# Patient Record
Sex: FEMALE | Race: WHITE | Employment: OTHER | URBAN - NONMETROPOLITAN AREA
[De-identification: names, ages, dates, MRNs, and addresses within clinical notes are randomized per-mention and may not be internally consistent; named-entity substitution may affect disease eponyms.]

---

## 2017-02-15 ENCOUNTER — HOSPITAL ENCOUNTER (OUTPATIENT)
Dept: VASCULAR LAB | Age: 67
Discharge: HOME | End: 2017-02-15
Payer: MEDICARE

## 2017-02-15 DIAGNOSIS — M79.662 PAIN OF LEFT LOWER LEG: Primary | ICD-10-CM

## 2017-02-15 PROCEDURE — 93971 EXTREMITY STUDY: CPT

## 2017-04-07 ENCOUNTER — HOSPITAL ENCOUNTER (OUTPATIENT)
Dept: GENERAL RADIOLOGY | Age: 67
Discharge: HOME | End: 2017-04-07
Payer: MEDICARE

## 2017-04-07 DIAGNOSIS — R31.9 BLOOD URINE: ICD-10-CM

## 2017-04-07 PROCEDURE — 74000 XR ABDOMEN LIMITED (KUB): CPT

## 2017-04-10 ENCOUNTER — TELEPHONE (OUTPATIENT)
Dept: UROLOGY | Facility: CLINIC | Age: 67
End: 2017-04-10

## 2017-04-10 DIAGNOSIS — N20.0 KIDNEY STONE: Primary | ICD-10-CM

## 2017-04-10 NOTE — TELEPHONE ENCOUNTER
Order in      ----- Message from Henry Baldwin sent at 4/10/2017 11:12 AM CDT -----  [facility] ORDER

## 2017-04-12 ENCOUNTER — OFFICE VISIT (OUTPATIENT)
Dept: UROLOGY | Facility: CLINIC | Age: 67
End: 2017-04-12

## 2017-04-12 ENCOUNTER — HOSPITAL ENCOUNTER (OUTPATIENT)
Dept: GENERAL RADIOLOGY | Facility: HOSPITAL | Age: 67
Discharge: HOME | End: 2017-04-12
Attending: UROLOGY | Admitting: UROLOGY

## 2017-04-12 VITALS
SYSTOLIC BLOOD PRESSURE: 126 MMHG | DIASTOLIC BLOOD PRESSURE: 84 MMHG | BODY MASS INDEX: 34.66 KG/M2 | WEIGHT: 195.6 LBS | TEMPERATURE: 96.6 F | HEIGHT: 63 IN

## 2017-04-12 DIAGNOSIS — R10.9 FLANK PAIN, ACUTE: Primary | ICD-10-CM

## 2017-04-12 DIAGNOSIS — N20.0 KIDNEY STONE: ICD-10-CM

## 2017-04-12 LAB
BILIRUB BLD-MCNC: NEGATIVE MG/DL
CLARITY, POC: CLEAR
COLOR UR: YELLOW
GLUCOSE UR STRIP-MCNC: ABNORMAL MG/DL
KETONES UR QL: NEGATIVE
LEUKOCYTE EST, POC: NEGATIVE
NITRITE UR-MCNC: NEGATIVE MG/ML
PH UR: 5 [PH] (ref 5–8)
PROT UR STRIP-MCNC: NEGATIVE MG/DL
RBC # UR STRIP: NEGATIVE /UL
SP GR UR: 1.01 (ref 1–1.03)
UROBILINOGEN UR QL: NORMAL

## 2017-04-12 PROCEDURE — 74000 HC ABDOMEN KUB: CPT

## 2017-04-12 PROCEDURE — 99204 OFFICE O/P NEW MOD 45 MIN: CPT | Performed by: UROLOGY

## 2017-04-12 PROCEDURE — 81003 URINALYSIS AUTO W/O SCOPE: CPT | Performed by: UROLOGY

## 2017-04-12 RX ORDER — PANTOPRAZOLE SODIUM 40 MG/1
TABLET, DELAYED RELEASE ORAL
COMMUNITY
Start: 2016-10-26 | End: 2018-09-04

## 2017-04-12 RX ORDER — ATORVASTATIN CALCIUM 20 MG/1
20 TABLET, FILM COATED ORAL NIGHTLY
COMMUNITY

## 2017-04-12 RX ORDER — MELOXICAM 15 MG/1
TABLET ORAL
Status: ON HOLD | COMMUNITY
Start: 2016-09-28 | End: 2018-09-17

## 2017-04-12 RX ORDER — OXYCODONE HYDROCHLORIDE AND ACETAMINOPHEN 5; 325 MG/1; MG/1
1 TABLET ORAL EVERY 8 HOURS PRN
COMMUNITY
Start: 2017-04-10 | End: 2018-09-18 | Stop reason: HOSPADM

## 2017-04-12 RX ORDER — GLIMEPIRIDE 2 MG/1
2 TABLET ORAL DAILY PRN
COMMUNITY
Start: 2016-09-16

## 2017-04-12 RX ORDER — RIVAROXABAN 20 MG/1
TABLET, FILM COATED ORAL
COMMUNITY
Start: 2017-03-20 | End: 2018-09-04

## 2017-04-12 RX ORDER — BISOPROLOL FUMARATE 5 MG/1
5 TABLET, FILM COATED ORAL DAILY
COMMUNITY
Start: 2017-01-17 | End: 2022-02-16

## 2017-04-12 RX ORDER — DULOXETIN HYDROCHLORIDE 60 MG/1
60 CAPSULE, DELAYED RELEASE ORAL DAILY
COMMUNITY
Start: 2017-02-21

## 2017-04-12 NOTE — PROGRESS NOTES
Subjective    Ms. Martin is 66 y.o. female    Chief Complaint: Flank Pain    History of Present Illness     Urolithiasis  Patient complains of bilateral flank pain with radiation to the abdomen. Onset of symptoms was abrupt starting 1 week ago with unchanged course since that time. Patient describes the pain as burning, continuous and rated as moderate. The patient has had no nausea and no vomiting. There has been no fever or chills. The patient is complaining of dysuria, frequency, or urgency.  Previous management of stones includes none      The following portions of the patient's history were reviewed and updated as appropriate: allergies, current medications, past family history, past medical history, past social history, past surgical history and problem list.    Review of Systems   Constitutional: Negative for appetite change, diaphoresis and fever.   HENT: Negative for facial swelling and sore throat.    Eyes: Negative for discharge and visual disturbance.   Respiratory: Negative for cough and shortness of breath.    Cardiovascular: Negative for chest pain and leg swelling.   Gastrointestinal: Negative for anal bleeding and vomiting.   Endocrine: Negative for cold intolerance and heat intolerance.   Genitourinary: Positive for dysuria and flank pain. Negative for hematuria and pelvic pain.   Musculoskeletal: Negative for back pain and gait problem.   Skin: Negative for pallor and rash.   Allergic/Immunologic: Negative for food allergies and immunocompromised state.   Neurological: Negative for seizures and headaches.   Hematological: Negative for adenopathy. Does not bruise/bleed easily.   Psychiatric/Behavioral: Negative for dysphoric mood, self-injury and suicidal ideas.         Current Outpatient Prescriptions:   •  aspirin 81 MG tablet, , Disp: , Rfl:   •  atorvastatin (LIPITOR) 20 MG tablet, Take  by mouth., Disp: , Rfl:   •  bisoprolol (ZEBeta) 5 MG tablet, , Disp: , Rfl:   •  DULoxetine (CYMBALTA)  "60 MG capsule, , Disp: , Rfl:   •  fluticasone-salmeterol (ADVAIR) 250-50 MCG/DOSE DISKUS, Inhale Every 12 (Twelve) Hours., Disp: , Rfl:   •  glimepiride (AMARYL) 2 MG tablet, , Disp: , Rfl:   •  meloxicam (MOBIC) 15 MG tablet, , Disp: , Rfl:   •  metFORMIN (GLUCOPHAGE) 500 MG tablet, Take  by mouth., Disp: , Rfl:   •  oxyCODONE-acetaminophen (PERCOCET) 5-325 MG per tablet, , Disp: , Rfl:   •  pantoprazole (PROTONIX) 40 MG EC tablet, Take  by mouth., Disp: , Rfl:   •  XARELTO 20 MG tablet, , Disp: , Rfl:     Past Medical History:   Diagnosis Date   • Chronic kidney disease     stage 2   • Diabetes mellitus    • Hyperlipidemia    • Hypertension        Past Surgical History:   Procedure Laterality Date   • APPENDECTOMY     • BACK SURGERY     • CARPAL TUNNEL RELEASE     •  SECTION     • CHOLECYSTECTOMY     • HYSTERECTOMY     • KNEE SURGERY     • LEG SURGERY      blood clot removed   • SHOULDER SURGERY     • TONSILLECTOMY         Social History     Social History   • Marital status:      Spouse name: N/A   • Number of children: N/A   • Years of education: N/A     Social History Main Topics   • Smoking status: Current Every Day Smoker     Types: Cigarettes   • Smokeless tobacco: None   • Alcohol use No   • Drug use: None   • Sexual activity: Not Asked     Other Topics Concern   • None     Social History Narrative   • None       Family History   Problem Relation Age of Onset   • Cancer Father    • No Known Problems Mother        Objective    /84  Temp 96.6 °F (35.9 °C)  Ht 63\" (160 cm)  Wt 195 lb 9.6 oz (88.7 kg)  BMI 34.65 kg/m2    Physical Exam   Constitutional: She is oriented to person, place, and time. She appears well-developed and well-nourished. No distress.   HENT:   Head: Normocephalic and atraumatic.   Right Ear: External ear and ear canal normal.   Left Ear: External ear and ear canal normal.   Nose: No nasal deformity. No epistaxis.   Mouth/Throat: Oropharynx is clear and moist. " Mucous membranes are not pale, not dry and not cyanotic. Normal dentition. No oropharyngeal exudate.   Neck: Trachea normal. No tracheal tenderness present. No tracheal deviation present. No thyroid mass and no thyromegaly present.   Pulmonary/Chest: Effort normal. No accessory muscle usage. No respiratory distress. Chest wall is not dull to percussion (No flatness or hyperresonance). She exhibits no mass and no tenderness.   On palpation, no tactile fremitus. All movements are symmetric. No intercostal retraction noted.    Abdominal: Soft. Normal appearance. She exhibits no distension and no mass. There is no hepatosplenomegaly. There is no tenderness. No hernia.   Rectal examination or stool specimen is not indicated.    Musculoskeletal:   Normal gait and station. The spine, ribs, and pelvis are examined. No obvious misalignment or asymmetry. ROM is reasonable for age. No instability. No obvious atrophy, flaccidity or spasticity.    Lymphadenopathy:     She has no cervical adenopathy.        Right: No inguinal adenopathy present.        Left: No inguinal adenopathy present.   Neurological: She is alert and oriented to person, place, and time.   Skin: Skin is warm, dry and intact. No lesion and no rash noted. She is not diaphoretic. No cyanosis. No pallor. Nails show no clubbing.   On palpation, there were no induration, subcutaneous nodules, or tightening   Psychiatric: Her speech is normal and behavior is normal. Judgment and thought content normal. Her mood appears not anxious. Her affect is not labile. She does not exhibit a depressed mood.   Vitals reviewed.          Results for orders placed or performed in visit on 04/12/17   POC Urinalysis Dipstick, Automated   Result Value Ref Range    Color Yellow Yellow, Straw, Dark Yellow, Kelle    Clarity, UA Clear Clear    Glucose,  mg/dL (A) Negative, 1000 mg/dL (3+) mg/dL    Bilirubin Negative Negative    Ketones, UA Negative Negative    Specific Gravity   1.015 1.005 - 1.030    Blood, UA Negative Negative    pH, Urine 5.0 5.0 - 8.0    Protein, POC Negative Negative mg/dL    Urobilinogen, UA Normal Normal    Leukocytes Negative Negative    Nitrite, UA Negative Negative     KUB independent review    A KUB is available for me to review today.  The image is inspected for a bowel gas pattern and the general bone structure of the spine and pelvis. The kidneys are then inspected closely.  Renal outline is noted if identifiable. The kidney, collecting system, and anticipated path of the ureter are examined for calcifications including those in the true pelvis.  This film reveals:    On the right there multiple pelvic phelboliths.    On the left there multiple pelvic phelboliths.        Assessment and Plan    Blanca was seen today for flank pain.    Diagnoses and all orders for this visit:    Flank pain, acute  -     POC Urinalysis Dipstick, Automated  -     CT Abdomen Pelvis Stone Protocol; Future        Patient has bilateral flank pain right greater than left range of her abdomen.  I do not see definitive stones on either a KUB.  Her urine today is negative I think she needs a CAT scan noncontrasted stone protocol to fully evaluate.

## 2017-04-13 ENCOUNTER — HOSPITAL ENCOUNTER (OUTPATIENT)
Dept: CT IMAGING | Facility: HOSPITAL | Age: 67
Discharge: HOME | End: 2017-04-13
Attending: UROLOGY | Admitting: UROLOGY

## 2017-04-13 ENCOUNTER — OFFICE VISIT (OUTPATIENT)
Dept: UROLOGY | Facility: CLINIC | Age: 67
End: 2017-04-13

## 2017-04-13 VITALS
DIASTOLIC BLOOD PRESSURE: 76 MMHG | TEMPERATURE: 96.4 F | BODY MASS INDEX: 34.69 KG/M2 | WEIGHT: 195.8 LBS | SYSTOLIC BLOOD PRESSURE: 118 MMHG | HEIGHT: 63 IN

## 2017-04-13 DIAGNOSIS — R10.9 FLANK PAIN, ACUTE: Primary | ICD-10-CM

## 2017-04-13 DIAGNOSIS — R10.9 FLANK PAIN, ACUTE: ICD-10-CM

## 2017-04-13 PROCEDURE — 74176 CT ABD & PELVIS W/O CONTRAST: CPT

## 2017-04-13 PROCEDURE — 99213 OFFICE O/P EST LOW 20 MIN: CPT | Performed by: UROLOGY

## 2017-04-13 NOTE — PROGRESS NOTES
Subjective    Ms. Martin is 66 y.o. female    Chief Complaint: Flank Pain    History of Present Illness     Urolithiasis  Patient complains of bilateral flank pain with radiation to the abdomen. Onset of symptoms was abrupt starting 1 week ago with unchanged course since that time. Patient describes the pain as burning, continuous and rated as moderate. The patient has had no nausea and no vomiting. There has been no fever or chills. The patient is complaining of dysuria, frequency, or urgency. Previous management of stones includes none          The following portions of the patient's history were reviewed and updated as appropriate: allergies, current medications, past family history, past medical history, past social history, past surgical history and problem list.    Review of Systems   Constitutional: Negative for chills and fever.   Gastrointestinal: Negative for abdominal pain, anal bleeding and blood in stool.   Genitourinary: Positive for flank pain. Negative for hematuria.         Current Outpatient Prescriptions:   •  aspirin 81 MG tablet, , Disp: , Rfl:   •  atorvastatin (LIPITOR) 20 MG tablet, Take  by mouth., Disp: , Rfl:   •  bisoprolol (ZEBeta) 5 MG tablet, , Disp: , Rfl:   •  DULoxetine (CYMBALTA) 60 MG capsule, , Disp: , Rfl:   •  fluticasone-salmeterol (ADVAIR) 250-50 MCG/DOSE DISKUS, Inhale Every 12 (Twelve) Hours., Disp: , Rfl:   •  glimepiride (AMARYL) 2 MG tablet, , Disp: , Rfl:   •  meloxicam (MOBIC) 15 MG tablet, , Disp: , Rfl:   •  metFORMIN (GLUCOPHAGE) 500 MG tablet, Take  by mouth., Disp: , Rfl:   •  oxyCODONE-acetaminophen (PERCOCET) 5-325 MG per tablet, , Disp: , Rfl:   •  pantoprazole (PROTONIX) 40 MG EC tablet, Take  by mouth., Disp: , Rfl:   •  XARELTO 20 MG tablet, , Disp: , Rfl:     Past Medical History:   Diagnosis Date   • Chronic kidney disease     stage 2   • Diabetes mellitus    • Hyperlipidemia    • Hypertension        Past Surgical History:   Procedure Laterality Date  "  • APPENDECTOMY     • BACK SURGERY     • CARPAL TUNNEL RELEASE     •  SECTION     • CHOLECYSTECTOMY     • HYSTERECTOMY     • KNEE SURGERY     • LEG SURGERY      blood clot removed   • SHOULDER SURGERY     • TONSILLECTOMY         Social History     Social History   • Marital status:      Spouse name: N/A   • Number of children: N/A   • Years of education: N/A     Social History Main Topics   • Smoking status: Current Every Day Smoker     Types: Cigarettes   • Smokeless tobacco: None   • Alcohol use No   • Drug use: None   • Sexual activity: Not Asked     Other Topics Concern   • None     Social History Narrative       Family History   Problem Relation Age of Onset   • Cancer Father    • No Known Problems Mother        Objective    /76  Temp 96.4 °F (35.8 °C)  Ht 63\" (160 cm)  Wt 195 lb 12.8 oz (88.8 kg)  BMI 34.68 kg/m2    Physical Exam   Constitutional: She is oriented to person, place, and time. She appears well-developed and well-nourished. No distress.   Pulmonary/Chest: Effort normal.   Abdominal: Soft. She exhibits no distension and no mass. There is no tenderness. There is no rebound and no guarding. No hernia.   Neurological: She is alert and oriented to person, place, and time.   Skin: Skin is warm and dry. She is not diaphoretic.   Psychiatric: She has a normal mood and affect.   Vitals reviewed.          Results for orders placed or performed in visit on 17   POC Urinalysis Dipstick, Automated   Result Value Ref Range    Color Yellow Yellow, Straw, Dark Yellow, Kelle    Clarity, UA Clear Clear    Glucose,  mg/dL (A) Negative, 1000 mg/dL (3+) mg/dL    Bilirubin Negative Negative    Ketones, UA Negative Negative    Specific Gravity  1.015 1.005 - 1.030    Blood, UA Negative Negative    pH, Urine 5.0 5.0 - 8.0    Protein, POC Negative Negative mg/dL    Urobilinogen, UA Normal Normal    Leukocytes Negative Negative    Nitrite, UA Negative Negative     CT independent " review  The CT scan of the abdomen/pelvis done without contrast is available for me to review.  Treatment recommendations require an independent review.  First I scanned the liver, spleen, and bowel pattern.  The retroperitoneum including the major vessels and lymphatic packages are briefly reviewed.  This film as been reviewed by the radiologist to determine any non urologic abnormalities that are present.  The kidneys are closely inspected for size, symmetry, contour, parenchymal thickness, perinephric reaction, presence of calcifications, and intrarenal dilation of the collecting system.  The ureters are inspected for their course, caliber, and any calcifications.  The bladder is inspected for its thickness, size, and presence of any calcifications.  This scan shows:    The right kidney appears normal on this non-contrasted CT scan.  The renal parenchymal is norml in thickness.  There are no solid masses or cysts.  There is no hydronephrosis.  There are no stones.      The left kidney appears normal on this non-contrasted CT scan.  The renal parenchymal is norml in thickness.  There are no solid masses or cysts.  There is no hydronephrosis.  There are no stones.      The bladder appears normal on this non-contrasted CT scan.  The bladder appears normal in thickness.  There no masses or stones seen on this exam.      Assessment and Plan    Blanca was seen today for flank pain.    Diagnoses and all orders for this visit:    Flank pain, acute      CT personally reviewed she has no evidence of hydronephrosis there is no obstructing or nonobstructing stones.  I do not think her pain has a  source I will have her follow-up on a when necessary basis.

## 2017-08-24 LAB
AMYLASE: 48 U/L (ref 28–100)
LIPASE: 33 U/L (ref 13–60)

## 2017-09-20 ENCOUNTER — HOSPITAL ENCOUNTER (OUTPATIENT)
Dept: WOMENS IMAGING | Age: 67
Discharge: HOME | End: 2017-09-20
Payer: MEDICARE

## 2017-09-20 ENCOUNTER — HOSPITAL ENCOUNTER (OUTPATIENT)
Dept: NON INVASIVE DIAGNOSTICS | Age: 67
Discharge: HOME | End: 2017-09-20
Payer: MEDICARE

## 2017-09-20 DIAGNOSIS — Z12.31 ENCOUNTER FOR SCREENING MAMMOGRAM FOR BREAST CANCER: ICD-10-CM

## 2017-09-20 PROCEDURE — 93971 EXTREMITY STUDY: CPT

## 2017-09-20 PROCEDURE — 77063 BREAST TOMOSYNTHESIS BI: CPT

## 2018-03-21 ENCOUNTER — HOSPITAL ENCOUNTER (OUTPATIENT)
Dept: NON INVASIVE DIAGNOSTICS | Age: 68
Discharge: HOME | End: 2018-03-21
Payer: MEDICARE

## 2018-03-21 PROCEDURE — 93971 EXTREMITY STUDY: CPT

## 2018-05-04 ENCOUNTER — TRANSCRIBE ORDERS (OUTPATIENT)
Dept: ADMINISTRATIVE | Facility: HOSPITAL | Age: 68
End: 2018-05-04

## 2018-05-04 DIAGNOSIS — M54.5 LOW BACK PAIN, UNSPECIFIED BACK PAIN LATERALITY, UNSPECIFIED CHRONICITY, WITH SCIATICA PRESENCE UNSPECIFIED: Primary | ICD-10-CM

## 2018-05-04 DIAGNOSIS — M54.16 LUMBAR RADICULOPATHY: ICD-10-CM

## 2018-05-08 ENCOUNTER — HOSPITAL ENCOUNTER (OUTPATIENT)
Dept: CT IMAGING | Facility: HOSPITAL | Age: 68
Discharge: HOME | End: 2018-05-08
Admitting: PHYSICIAN ASSISTANT

## 2018-05-08 DIAGNOSIS — M54.16 LUMBAR RADICULOPATHY: ICD-10-CM

## 2018-05-08 DIAGNOSIS — M54.5 LOW BACK PAIN, UNSPECIFIED BACK PAIN LATERALITY, UNSPECIFIED CHRONICITY, WITH SCIATICA PRESENCE UNSPECIFIED: ICD-10-CM

## 2018-05-08 PROCEDURE — 72131 CT LUMBAR SPINE W/O DYE: CPT

## 2018-06-25 LAB
ALBUMIN SERPL-MCNC: 4.4 G/DL (ref 3.5–5.2)
ALP BLD-CCNC: 72 U/L (ref 35–104)
ALT SERPL-CCNC: 34 U/L (ref 5–33)
ANION GAP SERPL CALCULATED.3IONS-SCNC: 16 MMOL/L (ref 7–19)
AST SERPL-CCNC: 15 U/L (ref 5–32)
BILIRUB SERPL-MCNC: <0.2 MG/DL (ref 0.2–1.2)
BUN BLDV-MCNC: 23 MG/DL (ref 8–23)
CALCIUM SERPL-MCNC: 9.3 MG/DL (ref 8.8–10.2)
CHLORIDE BLD-SCNC: 101 MMOL/L (ref 98–111)
CHOLESTEROL, TOTAL: 149 MG/DL (ref 160–199)
CO2: 24 MMOL/L (ref 22–29)
CREAT SERPL-MCNC: 1 MG/DL (ref 0.5–0.9)
CREATININE URINE: 153.6 MG/DL (ref 4.2–622)
GFR NON-AFRICAN AMERICAN: 55
GLUCOSE BLD-MCNC: 184 MG/DL (ref 74–109)
HBA1C MFR BLD: 7.9 %
HDLC SERPL-MCNC: 37 MG/DL (ref 65–121)
LDL CHOLESTEROL CALCULATED: 78 MG/DL
MICROALBUMIN UR-MCNC: 10.5 MG/DL (ref 0–19)
MICROALBUMIN/CREAT UR-RTO: 68.4 MG/G
POTASSIUM SERPL-SCNC: 4.6 MMOL/L (ref 3.5–5)
SODIUM BLD-SCNC: 141 MMOL/L (ref 136–145)
TOTAL PROTEIN: 7.3 G/DL (ref 6.6–8.7)
TRIGL SERPL-MCNC: 168 MG/DL (ref 0–149)
TSH SERPL DL<=0.05 MIU/L-ACNC: 1.65 UIU/ML (ref 0.27–4.2)

## 2018-09-04 ENCOUNTER — HOSPITAL ENCOUNTER (OUTPATIENT)
Dept: GENERAL RADIOLOGY | Facility: HOSPITAL | Age: 68
Discharge: HOME | End: 2018-09-04
Admitting: ORTHOPAEDIC SURGERY

## 2018-09-04 ENCOUNTER — APPOINTMENT (OUTPATIENT)
Dept: PREADMISSION TESTING | Facility: HOSPITAL | Age: 68
End: 2018-09-04

## 2018-09-04 VITALS
HEART RATE: 81 BPM | RESPIRATION RATE: 18 BRPM | SYSTOLIC BLOOD PRESSURE: 142 MMHG | WEIGHT: 191.58 LBS | BODY MASS INDEX: 32.71 KG/M2 | OXYGEN SATURATION: 97 % | HEIGHT: 64 IN | DIASTOLIC BLOOD PRESSURE: 76 MMHG

## 2018-09-04 LAB
ALBUMIN SERPL-MCNC: 4.6 G/DL (ref 3.5–5)
ALBUMIN/GLOB SERPL: 1.6 G/DL (ref 1.1–2.5)
ALP SERPL-CCNC: 60 U/L (ref 24–120)
ALT SERPL W P-5'-P-CCNC: 39 U/L (ref 0–54)
ANION GAP SERPL CALCULATED.3IONS-SCNC: 10 MMOL/L (ref 4–13)
APTT PPP: 23.7 SECONDS (ref 24.1–34.8)
AST SERPL-CCNC: 29 U/L (ref 7–45)
BACTERIA UR QL AUTO: ABNORMAL /HPF
BASOPHILS # BLD AUTO: 0.05 10*3/MM3 (ref 0–0.2)
BASOPHILS NFR BLD AUTO: 0.6 % (ref 0–2)
BILIRUB SERPL-MCNC: 0.4 MG/DL (ref 0.1–1)
BILIRUB UR QL STRIP: NEGATIVE
BUN BLD-MCNC: 23 MG/DL (ref 5–21)
BUN/CREAT SERPL: 22.1 (ref 7–25)
CALCIUM SPEC-SCNC: 9.9 MG/DL (ref 8.4–10.4)
CHLORIDE SERPL-SCNC: 102 MMOL/L (ref 98–110)
CLARITY UR: CLEAR
CO2 SERPL-SCNC: 27 MMOL/L (ref 24–31)
COLOR UR: YELLOW
CREAT BLD-MCNC: 1.04 MG/DL (ref 0.5–1.4)
DEPRECATED RDW RBC AUTO: 49.9 FL (ref 40–54)
EOSINOPHIL # BLD AUTO: 0.13 10*3/MM3 (ref 0–0.7)
EOSINOPHIL NFR BLD AUTO: 1.6 % (ref 0–4)
ERYTHROCYTE [DISTWIDTH] IN BLOOD BY AUTOMATED COUNT: 13.8 % (ref 12–15)
GFR SERPL CREATININE-BSD FRML MDRD: 52 ML/MIN/1.73
GLOBULIN UR ELPH-MCNC: 2.8 GM/DL
GLUCOSE BLD-MCNC: 134 MG/DL (ref 70–100)
GLUCOSE UR STRIP-MCNC: NEGATIVE MG/DL
HCT VFR BLD AUTO: 41.6 % (ref 37–47)
HGB BLD-MCNC: 13.6 G/DL (ref 12–16)
HGB UR QL STRIP.AUTO: ABNORMAL
HYALINE CASTS UR QL AUTO: ABNORMAL /LPF
IMM GRANULOCYTES # BLD: 0.05 10*3/MM3 (ref 0–0.03)
IMM GRANULOCYTES NFR BLD: 0.6 % (ref 0–5)
INR PPP: 0.85 (ref 0.91–1.09)
KETONES UR QL STRIP: NEGATIVE
LEUKOCYTE ESTERASE UR QL STRIP.AUTO: NEGATIVE
LYMPHOCYTES # BLD AUTO: 3.51 10*3/MM3 (ref 0.72–4.86)
LYMPHOCYTES NFR BLD AUTO: 44.4 % (ref 15–45)
MCH RBC QN AUTO: 32.2 PG (ref 28–32)
MCHC RBC AUTO-ENTMCNC: 32.7 G/DL (ref 33–36)
MCV RBC AUTO: 98.6 FL (ref 82–98)
MONOCYTES # BLD AUTO: 0.54 10*3/MM3 (ref 0.19–1.3)
MONOCYTES NFR BLD AUTO: 6.8 % (ref 4–12)
NEUTROPHILS # BLD AUTO: 3.63 10*3/MM3 (ref 1.87–8.4)
NEUTROPHILS NFR BLD AUTO: 46 % (ref 39–78)
NITRITE UR QL STRIP: NEGATIVE
NRBC BLD MANUAL-RTO: 0 /100 WBC (ref 0–0)
PH UR STRIP.AUTO: 5.5 [PH] (ref 5–8)
PLATELET # BLD AUTO: 253 10*3/MM3 (ref 130–400)
PMV BLD AUTO: 9.1 FL (ref 6–12)
POTASSIUM BLD-SCNC: 4.9 MMOL/L (ref 3.5–5.3)
PROT SERPL-MCNC: 7.4 G/DL (ref 6.3–8.7)
PROT UR QL STRIP: NEGATIVE
PROTHROMBIN TIME: 11.9 SECONDS (ref 11.9–14.6)
RBC # BLD AUTO: 4.22 10*6/MM3 (ref 4.2–5.4)
RBC # UR: ABNORMAL /HPF
REF LAB TEST METHOD: ABNORMAL
SODIUM BLD-SCNC: 139 MMOL/L (ref 135–145)
SP GR UR STRIP: 1.01 (ref 1–1.03)
SQUAMOUS #/AREA URNS HPF: ABNORMAL /HPF
UROBILINOGEN UR QL STRIP: ABNORMAL
WBC NRBC COR # BLD: 7.91 10*3/MM3 (ref 4.8–10.8)
WBC UR QL AUTO: ABNORMAL /HPF

## 2018-09-04 PROCEDURE — 71046 X-RAY EXAM CHEST 2 VIEWS: CPT

## 2018-09-04 PROCEDURE — 93005 ELECTROCARDIOGRAM TRACING: CPT

## 2018-09-04 PROCEDURE — 80053 COMPREHEN METABOLIC PANEL: CPT | Performed by: ORTHOPAEDIC SURGERY

## 2018-09-04 PROCEDURE — 93010 ELECTROCARDIOGRAM REPORT: CPT | Performed by: INTERNAL MEDICINE

## 2018-09-04 PROCEDURE — 85730 THROMBOPLASTIN TIME PARTIAL: CPT | Performed by: ORTHOPAEDIC SURGERY

## 2018-09-04 PROCEDURE — 36415 COLL VENOUS BLD VENIPUNCTURE: CPT

## 2018-09-04 PROCEDURE — 85610 PROTHROMBIN TIME: CPT | Performed by: ORTHOPAEDIC SURGERY

## 2018-09-04 PROCEDURE — 81001 URINALYSIS AUTO W/SCOPE: CPT | Performed by: ORTHOPAEDIC SURGERY

## 2018-09-04 PROCEDURE — 85025 COMPLETE CBC W/AUTO DIFF WBC: CPT | Performed by: ORTHOPAEDIC SURGERY

## 2018-09-04 RX ORDER — CLONIDINE HYDROCHLORIDE 0.1 MG/1
0.1 TABLET ORAL DAILY PRN
COMMUNITY

## 2018-09-04 RX ORDER — TIZANIDINE 4 MG/1
4 TABLET ORAL EVERY 8 HOURS PRN
COMMUNITY

## 2018-09-04 RX ORDER — PERPHENAZINE/AMITRIPTYLINE HCL 2 MG-25 MG
1 TABLET ORAL DAILY
COMMUNITY
End: 2021-07-20

## 2018-09-04 RX ORDER — BUSPIRONE HYDROCHLORIDE 15 MG/1
15 TABLET ORAL DAILY PRN
COMMUNITY

## 2018-09-04 RX ORDER — OMEPRAZOLE 40 MG/1
40 CAPSULE, DELAYED RELEASE ORAL AS NEEDED
COMMUNITY
End: 2021-07-20

## 2018-09-04 NOTE — DISCHARGE INSTRUCTIONS
DAY OF SURGERY INSTRUCTIONS        YOUR SURGEON: ***RICARDO FERRIS     PROCEDURE: ***ANTERIOR CERVICAL DISCECTOMY FUSION WITH INSTRUMENTATION     DATE OF SURGERY: ***SEPTEMBER 17, 2018    ARRIVAL TIME: AS DIRECTED BY OFFICE    DAY OF SURGERY TAKE ONLY THESE MEDICATIONS UNLESS OTHERWISE INSTRUCTED BY YOUR PHYSICIAN: ***BISOPROLOL        MANAGING PAIN AFTER SURGERY    We know you are probably wondering what your pain will be like after surgery.  Following surgery it is unrealistic to expect you will not have pain.   Pain is how our bodies let us know that something is wrong or cautions us to be careful.  That said, our goal is to make your pain tolerable.    Methods we may use to treat your pain include (oral or IV medications, PCAs, epidurals, nerve blocks, etc.)   While some procedures require IV pain medications for a short time after surgery, transitioning to pain medications by mouth allows for better management of pain.   Your nurse will encourage you to take oral pain medications whenever possible.  IV medications work almost immediately, but only last a short while.  Taking medications by mouth allows for a more constant level of medication in your blood stream for a longer period of time.      Once your pain is out of control it is harder to get back under control.  It is important you are aware when your next dose of pain medication is due.  If you are admitted, your nurse may write the time of your next dose on the white board in your room to help you remember.      We are interested in your pain and encourage you to inform us about aggravating factors during your visit.   Many times a simple repositioning every few hours can make a big difference.    If your physician says it is okay, do not let your pain prevent you from getting out of bed. Be sure to call your nurse for assistance prior to getting up so you do not fall.      Before surgery, please decide your tolerable pain goal.  These faces help  describe the pain ratings we use on a 0-10 scale.   Be prepared to tell us your goal and whether or not you take pain or anxiety medications at home.          BEFORE YOU COME TO THE HOSPITAL  (Pre-op instructions)  • Do not eat, drink, smoke or chew gum after midnight the night before surgery.  This also includes no mints.  • Morning of surgery take only the medicines you have been instructed with a sip of water unless otherwise instructed  by your physician.  • Do not shave, wear makeup or dark nail polish.  • Remove all jewelry including rings.  • Leave anything you consider valuable at home.  • Leave your suitcase in the car until after your surgery.  • Bring the following with you if applicable:  o Picture ID and insurance, Medicare or Medicaid cards  o Co-pay/deductible required by insurance (cash, check, credit card)  o Copy of advance directive, living will or power-of- documents if not brought to PAT  o CPAP or BIPAP mask and tubing  o Relaxation aids (MP3 player, book, magazine)  • On the day of surgery check in at registration located at the main entrance of the hospital.       Outpatient Surgery Guidelines, Adult  Outpatient procedures are those for which the person having the procedure is allowed to go home the same day as the procedure. Various procedures are done on an outpatient basis. You should follow some general guidelines if you will be having an outpatient procedure.  LET YOUR HEALTH CARE PROVIDER KNOW ABOUT:  · Any allergies you have.  · All medicines you are taking, including vitamins, herbs, eye drops, creams, and over-the-counter medicines.  · Previous problems you or members of your family have had with the use of anesthetics.  · Any blood disorders you have.  · Previous surgeries you have had.  · Medical conditions you have.  RISKS AND COMPLICATIONS  Your health care provider will discuss possible risks and complications with you before surgery. Common risks and complications  include:    · Problems due to the use of anesthetics.  · Blood loss and replacement (does not apply to minor surgical procedures).  · Temporary increase in pain due to surgery.  · Uncorrected pain or problems that the surgery was meant to correct.  · Infection.  · New damage.  BEFORE THE PROCEDURE  · Ask your health care provider about changing or stopping your regular medicines. You may need to stop taking certain medicines in the days or weeks before the procedure.  · Stop smoking at least 2 weeks before surgery. This lowers your risk for complications during and after surgery. Ask your health care provider for help with this if needed.  · Eat your usual meals and a light supper the day before surgery. Continue fluid intake. Do not drink alcohol.  · Do not eat or drink after midnight the night before your surgery.   · Arrange for someone to take you home and to stay with you for 24 hours after the procedure. Medicine given for your procedure may affect your ability to drive or to care for yourself.  · Call your health care provider's office if you develop an illness or problem that may prevent you from safely having your procedure.  AFTER THE PROCEDURE  After surgery, you will be taken to a recovery area, where your progress will be monitored. If there are no complications, you will be allowed to go home when you are awake, stable, and taking fluids well. You may have numbness around the surgical site. Healing will take some time. You will have tenderness at the surgical site and may have some swelling and bruising. You may also have some nausea.  HOME CARE INSTRUCTIONS  · Do not drive for 24 hours, or as directed by your health care provider. Do not drive while taking prescription pain medicines.  · Do not drink alcohol for 24 hours.  · Do not make important decisions or sign legal documents for 24 hours.  · You may resume a normal diet and activities as directed.  · Do not lift anything heavier than 10 pounds  (4.5 kg) or play contact sports until your health care provider says it is okay.  · Change your bandages (dressings) as directed.  · Only take over-the-counter or prescription medicines as directed by your health care provider.  · Follow up with your health care provider as directed.  SEEK MEDICAL CARE IF:  · You have increased bleeding (more than a small spot) from the surgical site.  · You have redness, swelling, or increasing pain in the wound.  · You see pus coming from the wound.  · You have a fever.  · You notice a bad smell coming from the wound or dressing.  · You feel lightheaded or faint.  · You develop a rash.  · You have trouble breathing.  · You develop allergies.  MAKE SURE YOU:  · Understand these instructions.  · Will watch your condition.  · Will get help right away if you are not doing well or get worse.     This information is not intended to replace advice given to you by your health care provider. Make sure you discuss any questions you have with your health care provider.     Document Released: 09/12/2002 Document Revised: 05/03/2016 Document Reviewed: 05/22/2014  [employer] Interactive Patient Education ©2016 [employer]       Fall Prevention in Hospitals, Adult  As a hospital patient, your condition and the treatments you receive can increase your risk for falls. Some additional risk factors for falls in a hospital include:  · Being in an unfamiliar environment.  · Being on bed rest.  · Your surgery.  · Taking certain medicines.  · Your tubing requirements, such as intravenous (IV) therapy or catheters.  It is important that you learn how to decrease fall risks while at the hospital. Below are important tips that can help prevent falls.  SAFETY TIPS FOR PREVENTING FALLS  Talk about your risk of falling.  · Ask your health care provider why you are at risk for falling. Is it your medicine, illness, tubing placement, or something else?  · Make a plan with your health care provider to keep you  safe from falls.  · Ask your health care provider or pharmacist about side effects of your medicines. Some medicines can make you dizzy or affect your coordination.  Ask for help.  · Ask for help before getting out of bed. You may need to press your call button.  · Ask for assistance in getting safely to the toilet.  · Ask for a walker or cane to be put at your bedside. Ask that most of the side rails on your bed be placed up before your health care provider leaves the room.  · Ask family or friends to sit with you.  · Ask for things that are out of your reach, such as your glasses, hearing aids, telephone, bedside table, or call button.  Follow these tips to avoid falling:  · Stay lying or seated, rather than standing, while waiting for help.  · Wear rubber-soled slippers or shoes whenever you walk in the hospital.  · Avoid quick, sudden movements.  ¨ Change positions slowly.  ¨ Sit on the side of your bed before standing.  ¨ Stand up slowly and wait before you start to walk.  · Let your health care provider know if there is a spill on the floor.  · Pay careful attention to the medical equipment, electrical cords, and tubes around you.  · When you need help, use your call button by your bed or in the bathroom. Wait for one of your health care providers to help you.  · If you feel dizzy or unsure of your footing, return to bed and wait for assistance.  · Avoid being distracted by the TV, telephone, or another person in your room.  · Do not lean or support yourself on rolling objects, such as IV poles or bedside tables.     This information is not intended to replace advice given to you by your health care provider. Make sure you discuss any questions you have with your health care provider.     Document Released: 12/15/2001 Document Revised: 01/08/2016 Document Reviewed: 08/25/2013  [employer] Interactive Patient Education ©2016 [employer]       Surgical Site Infections FAQs  What is a Surgical Site Infection  (SSI)?  A surgical site infection is an infection that occurs after surgery in the part of the body where the surgery took place. Most patients who have surgery do not develop an infection. However, infections develop in about 1 to 3 out of every 100 patients who have surgery.  Some of the common symptoms of a surgical site infection are:  · Redness and pain around the area where you had surgery  · Drainage of cloudy fluid from your surgical wound  · Fever  Can SSIs be treated?  Yes. Most surgical site infections can be treated with antibiotics. The antibiotic given to you depends on the bacteria (germs) causing the infection. Sometimes patients with SSIs also need another surgery to treat the infection.  What are some of the things that hospitals are doing to prevent SSIs?  To prevent SSIs, doctors, nurses, and other healthcare providers:  · Clean their hands and arms up to their elbows with an antiseptic agent just before the surgery.  · Clean their hands with soap and water or an alcohol-based hand rub before and after caring for each patient.  · May remove some of your hair immediately before your surgery using electric clippers if the hair is in the same area where the procedure will occur. They should not shave you with a razor.  · Wear special hair covers, masks, gowns, and gloves during surgery to keep the surgery area clean.  · Give you antibiotics before your surgery starts. In most cases, you should get antibiotics within 60 minutes before the surgery starts and the antibiotics should be stopped within 24 hours after surgery.  · Clean the skin at the site of your surgery with a special soap that kills germs.  What can I do to help prevent SSIs?  Before your surgery:  · Tell your doctor about other medical problems you may have. Health problems such as allergies, diabetes, and obesity could affect your surgery and your treatment.  · Quit smoking. Patients who smoke get more infections. Talk to your doctor  about how you can quit before your surgery.  · Do not shave near where you will have surgery. Shaving with a razor can irritate your skin and make it easier to develop an infection.  At the time of your surgery:  · Speak up if someone tries to shave you with a razor before surgery. Ask why you need to be shaved and talk with your surgeon if you have any concerns.  · Ask if you will get antibiotics before surgery.  After your surgery:  · Make sure that your healthcare providers clean their hands before examining you, either with soap and water or an alcohol-based hand rub.  · If you do not see your providers clean their hands, please ask them to do so.  · Family and friends who visit you should not touch the surgical wound or dressings.  · Family and friends should clean their hands with soap and water or an alcohol-based hand rub before and after visiting you. If you do not see them clean their hands, ask them to clean their hands.  What do I need to do when I go home from the hospital?  · Before you go home, your doctor or nurse should explain everything you need to know about taking care of your wound. Make sure you understand how to care for your wound before you leave the hospital.  · Always clean your hands before and after caring for your wound.  · Before you go home, make sure you know who to contact if you have questions or problems after you get home.  · If you have any symptoms of an infection, such as redness and pain at the surgery site, drainage, or fever, call your doctor immediately.  If you have additional questions, please ask your doctor or nurse.  Developed and co-sponsored by The Society for Healthcare Epidemiology of [place] (SHEA); Infectious Diseases Society of [place] (IDSA); American Hospital Association; Association for Professionals in Infection Control and Epidemiology (APIC); Centers for Disease Control and Prevention (CDC); and The Joint Commission.     This information is not intended  to replace advice given to you by your health care provider. Make sure you discuss any questions you have with your health care provider.     Document Released: 12/23/2014 Document Revised: 01/08/2016 Document Reviewed: 03/02/2016  [employer] Interactive Patient Education ©2016 [employer]       [facility]  CHG 4% Patient Instruction Sheet    Preparing the Skin Before Surgery  Preparing or “prepping” skin before surgery can reduce the risk of infection at the surgical site. To make the process easier,[facility] has chosen 4% Chlorhexidine Gluconate (CHG) antiseptic solution.   The steps below outline the prepping process and should be carefully followed.                                                                                                                                                      Prep the skin at the following time(s):                                                      We recommend you shower the night before surgery, and again the morning of surgery with the 4% CHG antiseptic solution using half of the bottle and a cloth each time.  Dress in clean clothes/sleepwear after showering.  See instructions below for application.          Do not apply any lotions or moisturizers.       Do not shave the area to be prepped for at least 2 days prior to surgery.    Clipping the hair may be done immediately prior to your surgery at the hospital    if needed.    Directions:  Thoroughly rinse your body with water.  Apply 4% CHG to a cloth and wash skin gently, paying special attention to the operative site.  Rinse again thoroughly.  Once you have begun using this product do not apply anything else to your skin. If itching or redness persists, rinse affected areas and discontinue use.    When using this product:  • Keep out of eyes, ears, and mouth.  • If solution should contact these areas, rinse out promptly and thoroughly with water.  • For external use only.  • Do not use in genital area, on your  face or head.      PATIENT/FAMILY/RESPONSIBLE PARTY VERBALIZES UNDERSTANDING OF ABOVE EDUCATION.  COPY OF PAIN SCALE GIVEN AND REVIEWED WITH VERBALIZED UNDERSTANDING.

## 2018-09-14 ENCOUNTER — ANESTHESIA EVENT (OUTPATIENT)
Dept: PERIOP | Facility: HOSPITAL | Age: 68
End: 2018-09-14

## 2018-09-14 RX ORDER — SODIUM CHLORIDE, SODIUM LACTATE, POTASSIUM CHLORIDE, CALCIUM CHLORIDE 600; 310; 30; 20 MG/100ML; MG/100ML; MG/100ML; MG/100ML
30 INJECTION, SOLUTION INTRAVENOUS CONTINUOUS
Status: DISCONTINUED | OUTPATIENT
Start: 2018-09-14 | End: 2018-09-17

## 2018-09-14 RX ORDER — SODIUM CHLORIDE 0.9 % (FLUSH) 0.9 %
1-10 SYRINGE (ML) INJECTION AS NEEDED
Status: DISCONTINUED | OUTPATIENT
Start: 2018-09-14 | End: 2018-09-17

## 2018-09-17 ENCOUNTER — ANESTHESIA (OUTPATIENT)
Dept: PERIOP | Facility: HOSPITAL | Age: 68
End: 2018-09-17

## 2018-09-17 ENCOUNTER — HOSPITAL ENCOUNTER (INPATIENT)
Facility: HOSPITAL | Age: 68
LOS: 1 days | Discharge: HOME | End: 2018-09-18
Attending: ORTHOPAEDIC SURGERY | Admitting: ORTHOPAEDIC SURGERY

## 2018-09-17 ENCOUNTER — APPOINTMENT (OUTPATIENT)
Dept: GENERAL RADIOLOGY | Facility: HOSPITAL | Age: 68
End: 2018-09-17

## 2018-09-17 DIAGNOSIS — Z91.89 POTENTIAL FOR SELF CARE DEFICIT: ICD-10-CM

## 2018-09-17 PROBLEM — M48.02 STENOSIS, CERVICAL SPINE: Status: ACTIVE | Noted: 2018-09-17

## 2018-09-17 LAB
ABO GROUP BLD: NORMAL
BLD GP AB SCN SERPL QL: NEGATIVE
GLUCOSE BLDC GLUCOMTR-MCNC: 149 MG/DL (ref 70–130)
GLUCOSE BLDC GLUCOMTR-MCNC: 172 MG/DL (ref 70–130)
RH BLD: POSITIVE
T&S EXPIRATION DATE: NORMAL

## 2018-09-17 PROCEDURE — 94799 UNLISTED PULMONARY SVC/PX: CPT

## 2018-09-17 PROCEDURE — C1713 ANCHOR/SCREW BN/BN,TIS/BN: HCPCS | Performed by: ORTHOPAEDIC SURGERY

## 2018-09-17 PROCEDURE — 25010000002 DEXAMETHASONE PER 1 MG: Performed by: NURSE ANESTHETIST, CERTIFIED REGISTERED

## 2018-09-17 PROCEDURE — 25010000003 CEFAZOLIN PER 500 MG: Performed by: ORTHOPAEDIC SURGERY

## 2018-09-17 PROCEDURE — 72040 X-RAY EXAM NECK SPINE 2-3 VW: CPT

## 2018-09-17 PROCEDURE — 25010000002 PROPOFOL 1000 MG/ML EMULSION: Performed by: NURSE ANESTHETIST, CERTIFIED REGISTERED

## 2018-09-17 PROCEDURE — 0RB30ZZ EXCISION OF CERVICAL VERTEBRAL DISC, OPEN APPROACH: ICD-10-PCS | Performed by: ORTHOPAEDIC SURGERY

## 2018-09-17 PROCEDURE — 0RG2070 FUSION OF 2 OR MORE CERVICAL VERTEBRAL JOINTS WITH AUTOLOGOUS TISSUE SUBSTITUTE, ANTERIOR APPROACH, ANTERIOR COLUMN, OPEN APPROACH: ICD-10-PCS | Performed by: ORTHOPAEDIC SURGERY

## 2018-09-17 PROCEDURE — 82962 GLUCOSE BLOOD TEST: CPT

## 2018-09-17 PROCEDURE — 25010000002 HYDRALAZINE PER 20 MG: Performed by: ANESTHESIOLOGY

## 2018-09-17 PROCEDURE — 25010000002 PROPOFOL 10 MG/ML EMULSION: Performed by: NURSE ANESTHETIST, CERTIFIED REGISTERED

## 2018-09-17 PROCEDURE — 25010000002 MIDAZOLAM PER 1 MG: Performed by: NURSE ANESTHETIST, CERTIFIED REGISTERED

## 2018-09-17 PROCEDURE — L0174 CERV SR 2PC THOR EXT PRE OTS: HCPCS | Performed by: ORTHOPAEDIC SURGERY

## 2018-09-17 PROCEDURE — 86900 BLOOD TYPING SEROLOGIC ABO: CPT | Performed by: ORTHOPAEDIC SURGERY

## 2018-09-17 PROCEDURE — 86901 BLOOD TYPING SEROLOGIC RH(D): CPT | Performed by: ORTHOPAEDIC SURGERY

## 2018-09-17 PROCEDURE — 0RG20A0 FUSION OF 2 OR MORE CERVICAL VERTEBRAL JOINTS WITH INTERBODY FUSION DEVICE, ANTERIOR APPROACH, ANTERIOR COLUMN, OPEN APPROACH: ICD-10-PCS | Performed by: ORTHOPAEDIC SURGERY

## 2018-09-17 PROCEDURE — 76000 FLUOROSCOPY <1 HR PHYS/QHP: CPT

## 2018-09-17 PROCEDURE — 25010000002 PROPOFOL 1000 MG/ML EMULSION

## 2018-09-17 PROCEDURE — 86850 RBC ANTIBODY SCREEN: CPT | Performed by: ORTHOPAEDIC SURGERY

## 2018-09-17 PROCEDURE — 25010000002 HYDRALAZINE PER 20 MG

## 2018-09-17 PROCEDURE — 25010000002 SUCCINYLCHOLINE PER 20 MG: Performed by: NURSE ANESTHETIST, CERTIFIED REGISTERED

## 2018-09-17 PROCEDURE — 25010000002 ONDANSETRON PER 1 MG: Performed by: NURSE ANESTHETIST, CERTIFIED REGISTERED

## 2018-09-17 DEVICE — SPACR PEEK COHERE 7D LRD 8X14X16MM: Type: IMPLANTABLE DEVICE | Status: FUNCTIONAL

## 2018-09-17 DEVICE — HEMO ABS GELFOAM PWDR PORCN 1GM: Type: IMPLANTABLE DEVICE | Status: FUNCTIONAL

## 2018-09-17 DEVICE — 4.0MM FIXED ANGLE, SELF-DRILLING HEXALOBE SCREW, 14MM
Type: IMPLANTABLE DEVICE | Status: FUNCTIONAL
Brand: TRESTLE LUXE

## 2018-09-17 DEVICE — ANTERIOR CERVICAL PLATE ASSEMBLY, 3-LEVEL, 045MM
Type: IMPLANTABLE DEVICE | Status: FUNCTIONAL
Brand: TRESTLE LUXE

## 2018-09-17 DEVICE — MATRX BONE PRIMAGEN CELL 1CC: Type: IMPLANTABLE DEVICE | Status: FUNCTIONAL

## 2018-09-17 RX ORDER — DULOXETIN HYDROCHLORIDE 30 MG/1
60 CAPSULE, DELAYED RELEASE ORAL DAILY
Status: DISCONTINUED | OUTPATIENT
Start: 2018-09-17 | End: 2018-09-18 | Stop reason: HOSPADM

## 2018-09-17 RX ORDER — HYDRALAZINE HYDROCHLORIDE 20 MG/ML
5 INJECTION INTRAMUSCULAR; INTRAVENOUS
Status: DISCONTINUED | OUTPATIENT
Start: 2018-09-17 | End: 2018-09-17 | Stop reason: HOSPADM

## 2018-09-17 RX ORDER — NALOXONE HCL 0.4 MG/ML
0.4 VIAL (ML) INJECTION AS NEEDED
Status: DISCONTINUED | OUTPATIENT
Start: 2018-09-17 | End: 2018-09-17 | Stop reason: HOSPADM

## 2018-09-17 RX ORDER — ONDANSETRON 2 MG/ML
4 INJECTION INTRAMUSCULAR; INTRAVENOUS EVERY 6 HOURS PRN
Status: DISCONTINUED | OUTPATIENT
Start: 2018-09-17 | End: 2018-09-18 | Stop reason: HOSPADM

## 2018-09-17 RX ORDER — ONDANSETRON 2 MG/ML
4 INJECTION INTRAMUSCULAR; INTRAVENOUS EVERY 6 HOURS PRN
Status: DISCONTINUED | OUTPATIENT
Start: 2018-09-17 | End: 2018-09-17 | Stop reason: SDUPTHER

## 2018-09-17 RX ORDER — ACETAMINOPHEN 500 MG
1000 TABLET ORAL ONCE
Status: COMPLETED | OUTPATIENT
Start: 2018-09-17 | End: 2018-09-17

## 2018-09-17 RX ORDER — ROCURONIUM BROMIDE 10 MG/ML
INJECTION, SOLUTION INTRAVENOUS AS NEEDED
Status: DISCONTINUED | OUTPATIENT
Start: 2018-09-17 | End: 2018-09-17 | Stop reason: SURG

## 2018-09-17 RX ORDER — SODIUM CHLORIDE, SODIUM LACTATE, POTASSIUM CHLORIDE, CALCIUM CHLORIDE 600; 310; 30; 20 MG/100ML; MG/100ML; MG/100ML; MG/100ML
100 INJECTION, SOLUTION INTRAVENOUS CONTINUOUS
Status: DISCONTINUED | OUTPATIENT
Start: 2018-09-17 | End: 2018-09-17

## 2018-09-17 RX ORDER — DEXAMETHASONE SODIUM PHOSPHATE 4 MG/ML
INJECTION, SOLUTION INTRA-ARTICULAR; INTRALESIONAL; INTRAMUSCULAR; INTRAVENOUS; SOFT TISSUE AS NEEDED
Status: DISCONTINUED | OUTPATIENT
Start: 2018-09-17 | End: 2018-09-17 | Stop reason: SURG

## 2018-09-17 RX ORDER — MAGNESIUM HYDROXIDE 1200 MG/15ML
LIQUID ORAL AS NEEDED
Status: DISCONTINUED | OUTPATIENT
Start: 2018-09-17 | End: 2018-09-17 | Stop reason: HOSPADM

## 2018-09-17 RX ORDER — IBUPROFEN 600 MG/1
600 TABLET ORAL ONCE AS NEEDED
Status: DISCONTINUED | OUTPATIENT
Start: 2018-09-17 | End: 2018-09-17 | Stop reason: HOSPADM

## 2018-09-17 RX ORDER — LABETALOL HYDROCHLORIDE 5 MG/ML
5 INJECTION, SOLUTION INTRAVENOUS
Status: DISCONTINUED | OUTPATIENT
Start: 2018-09-17 | End: 2018-09-17 | Stop reason: HOSPADM

## 2018-09-17 RX ORDER — METOCLOPRAMIDE HYDROCHLORIDE 5 MG/ML
5 INJECTION INTRAMUSCULAR; INTRAVENOUS
Status: DISCONTINUED | OUTPATIENT
Start: 2018-09-17 | End: 2018-09-17 | Stop reason: HOSPADM

## 2018-09-17 RX ORDER — PROPOFOL 10 MG/ML
VIAL (ML) INTRAVENOUS AS NEEDED
Status: DISCONTINUED | OUTPATIENT
Start: 2018-09-17 | End: 2018-09-17 | Stop reason: SURG

## 2018-09-17 RX ORDER — LIDOCAINE HYDROCHLORIDE 40 MG/ML
SOLUTION TOPICAL AS NEEDED
Status: DISCONTINUED | OUTPATIENT
Start: 2018-09-17 | End: 2018-09-17 | Stop reason: SURG

## 2018-09-17 RX ORDER — BISOPROLOL FUMARATE 5 MG/1
5 TABLET, FILM COATED ORAL DAILY
Status: DISCONTINUED | OUTPATIENT
Start: 2018-09-18 | End: 2018-09-18 | Stop reason: HOSPADM

## 2018-09-17 RX ORDER — ONDANSETRON 4 MG/1
4 TABLET, FILM COATED ORAL EVERY 6 HOURS PRN
Status: DISCONTINUED | OUTPATIENT
Start: 2018-09-17 | End: 2018-09-18 | Stop reason: HOSPADM

## 2018-09-17 RX ORDER — OXYCODONE AND ACETAMINOPHEN 10; 325 MG/1; MG/1
2 TABLET ORAL EVERY 4 HOURS PRN
Status: DISCONTINUED | OUTPATIENT
Start: 2018-09-17 | End: 2018-09-18 | Stop reason: HOSPADM

## 2018-09-17 RX ORDER — IPRATROPIUM BROMIDE AND ALBUTEROL SULFATE 2.5; .5 MG/3ML; MG/3ML
3 SOLUTION RESPIRATORY (INHALATION) ONCE AS NEEDED
Status: DISCONTINUED | OUTPATIENT
Start: 2018-09-17 | End: 2018-09-17 | Stop reason: HOSPADM

## 2018-09-17 RX ORDER — MIDAZOLAM HYDROCHLORIDE 1 MG/ML
1 INJECTION INTRAMUSCULAR; INTRAVENOUS
Status: DISCONTINUED | OUTPATIENT
Start: 2018-09-17 | End: 2018-09-17

## 2018-09-17 RX ORDER — MIDAZOLAM HYDROCHLORIDE 1 MG/ML
INJECTION INTRAMUSCULAR; INTRAVENOUS AS NEEDED
Status: DISCONTINUED | OUTPATIENT
Start: 2018-09-17 | End: 2018-09-17 | Stop reason: SURG

## 2018-09-17 RX ORDER — OXYCODONE HCL 20 MG/1
20 TABLET, FILM COATED, EXTENDED RELEASE ORAL ONCE
Status: COMPLETED | OUTPATIENT
Start: 2018-09-17 | End: 2018-09-17

## 2018-09-17 RX ORDER — ONDANSETRON 4 MG/1
4 TABLET, ORALLY DISINTEGRATING ORAL EVERY 6 HOURS PRN
Status: DISCONTINUED | OUTPATIENT
Start: 2018-09-17 | End: 2018-09-18 | Stop reason: HOSPADM

## 2018-09-17 RX ORDER — FAMOTIDINE 20 MG/1
20 TABLET, FILM COATED ORAL EVERY 12 HOURS SCHEDULED
Status: DISCONTINUED | OUTPATIENT
Start: 2018-09-17 | End: 2018-09-18 | Stop reason: HOSPADM

## 2018-09-17 RX ORDER — CLONIDINE HYDROCHLORIDE 0.1 MG/1
0.1 TABLET ORAL DAILY PRN
Status: DISCONTINUED | OUTPATIENT
Start: 2018-09-17 | End: 2018-09-18 | Stop reason: HOSPADM

## 2018-09-17 RX ORDER — SUFENTANIL CITRATE 50 UG/ML
INJECTION EPIDURAL; INTRAVENOUS AS NEEDED
Status: DISCONTINUED | OUTPATIENT
Start: 2018-09-17 | End: 2018-09-17 | Stop reason: SURG

## 2018-09-17 RX ORDER — MEPERIDINE HYDROCHLORIDE 25 MG/ML
12.5 INJECTION INTRAMUSCULAR; INTRAVENOUS; SUBCUTANEOUS
Status: DISCONTINUED | OUTPATIENT
Start: 2018-09-17 | End: 2018-09-17 | Stop reason: HOSPADM

## 2018-09-17 RX ORDER — GLIPIZIDE 5 MG/1
2.5 TABLET ORAL
Status: DISCONTINUED | OUTPATIENT
Start: 2018-09-18 | End: 2018-09-18 | Stop reason: HOSPADM

## 2018-09-17 RX ORDER — SUCCINYLCHOLINE CHLORIDE 20 MG/ML
INJECTION INTRAMUSCULAR; INTRAVENOUS AS NEEDED
Status: DISCONTINUED | OUTPATIENT
Start: 2018-09-17 | End: 2018-09-17 | Stop reason: SURG

## 2018-09-17 RX ORDER — FENTANYL CITRATE 50 UG/ML
25 INJECTION, SOLUTION INTRAMUSCULAR; INTRAVENOUS AS NEEDED
Status: DISCONTINUED | OUTPATIENT
Start: 2018-09-17 | End: 2018-09-17 | Stop reason: HOSPADM

## 2018-09-17 RX ORDER — ONDANSETRON 2 MG/ML
4 INJECTION INTRAMUSCULAR; INTRAVENOUS ONCE AS NEEDED
Status: DISCONTINUED | OUTPATIENT
Start: 2018-09-17 | End: 2018-09-17 | Stop reason: HOSPADM

## 2018-09-17 RX ORDER — FAMOTIDINE 10 MG/ML
20 INJECTION, SOLUTION INTRAVENOUS EVERY 12 HOURS SCHEDULED
Status: DISCONTINUED | OUTPATIENT
Start: 2018-09-17 | End: 2018-09-18 | Stop reason: HOSPADM

## 2018-09-17 RX ORDER — LIDOCAINE HYDROCHLORIDE 20 MG/ML
INJECTION, SOLUTION INFILTRATION; PERINEURAL AS NEEDED
Status: DISCONTINUED | OUTPATIENT
Start: 2018-09-17 | End: 2018-09-17 | Stop reason: SURG

## 2018-09-17 RX ORDER — SODIUM CHLORIDE 0.9 % (FLUSH) 0.9 %
1-10 SYRINGE (ML) INJECTION AS NEEDED
Status: DISCONTINUED | OUTPATIENT
Start: 2018-09-17 | End: 2018-09-17

## 2018-09-17 RX ORDER — OXYCODONE AND ACETAMINOPHEN 10; 325 MG/1; MG/1
1 TABLET ORAL ONCE AS NEEDED
Status: DISCONTINUED | OUTPATIENT
Start: 2018-09-17 | End: 2018-09-17 | Stop reason: HOSPADM

## 2018-09-17 RX ORDER — ONDANSETRON 2 MG/ML
INJECTION INTRAMUSCULAR; INTRAVENOUS AS NEEDED
Status: DISCONTINUED | OUTPATIENT
Start: 2018-09-17 | End: 2018-09-17 | Stop reason: SURG

## 2018-09-17 RX ORDER — SODIUM CHLORIDE 9 MG/ML
INJECTION, SOLUTION INTRAVENOUS AS NEEDED
Status: DISCONTINUED | OUTPATIENT
Start: 2018-09-17 | End: 2018-09-17 | Stop reason: HOSPADM

## 2018-09-17 RX ORDER — SODIUM CHLORIDE, SODIUM LACTATE, POTASSIUM CHLORIDE, CALCIUM CHLORIDE 600; 310; 30; 20 MG/100ML; MG/100ML; MG/100ML; MG/100ML
9 INJECTION, SOLUTION INTRAVENOUS CONTINUOUS PRN
Status: DISCONTINUED | OUTPATIENT
Start: 2018-09-17 | End: 2018-09-17

## 2018-09-17 RX ORDER — SODIUM CHLORIDE 9 MG/ML
75 INJECTION, SOLUTION INTRAVENOUS CONTINUOUS
Status: DISCONTINUED | OUTPATIENT
Start: 2018-09-17 | End: 2018-09-18 | Stop reason: HOSPADM

## 2018-09-17 RX ORDER — MIDAZOLAM HYDROCHLORIDE 1 MG/ML
2 INJECTION INTRAMUSCULAR; INTRAVENOUS
Status: DISCONTINUED | OUTPATIENT
Start: 2018-09-17 | End: 2018-09-17

## 2018-09-17 RX ORDER — SODIUM CHLORIDE 0.9 % (FLUSH) 0.9 %
1-10 SYRINGE (ML) INJECTION AS NEEDED
Status: DISCONTINUED | OUTPATIENT
Start: 2018-09-17 | End: 2018-09-18 | Stop reason: HOSPADM

## 2018-09-17 RX ORDER — HYDRALAZINE HYDROCHLORIDE 20 MG/ML
INJECTION INTRAMUSCULAR; INTRAVENOUS
Status: COMPLETED
Start: 2018-09-17 | End: 2018-09-17

## 2018-09-17 RX ORDER — OXYCODONE AND ACETAMINOPHEN 7.5; 325 MG/1; MG/1
2 TABLET ORAL ONCE AS NEEDED
Status: DISCONTINUED | OUTPATIENT
Start: 2018-09-17 | End: 2018-09-17 | Stop reason: HOSPADM

## 2018-09-17 RX ORDER — ATORVASTATIN CALCIUM 10 MG/1
10 TABLET, FILM COATED ORAL NIGHTLY
Status: DISCONTINUED | OUTPATIENT
Start: 2018-09-17 | End: 2018-09-18 | Stop reason: HOSPADM

## 2018-09-17 RX ORDER — DIPHENHYDRAMINE HCL 25 MG
25 CAPSULE ORAL NIGHTLY PRN
Status: DISCONTINUED | OUTPATIENT
Start: 2018-09-17 | End: 2018-09-18 | Stop reason: HOSPADM

## 2018-09-17 RX ORDER — TIZANIDINE 4 MG/1
4 TABLET ORAL EVERY 8 HOURS PRN
Status: DISCONTINUED | OUTPATIENT
Start: 2018-09-17 | End: 2018-09-18 | Stop reason: HOSPADM

## 2018-09-17 RX ADMIN — SODIUM CHLORIDE 75 ML/HR: 9 INJECTION, SOLUTION INTRAVENOUS at 14:38

## 2018-09-17 RX ADMIN — SUFENTANIL CITRATE 10 MCG: 50 INJECTION, SOLUTION EPIDURAL; INTRAVENOUS at 09:44

## 2018-09-17 RX ADMIN — ROCURONIUM BROMIDE 5 MG: 10 INJECTION INTRAVENOUS at 09:28

## 2018-09-17 RX ADMIN — DEXAMETHASONE SODIUM PHOSPHATE 4 MG: 4 INJECTION, SOLUTION INTRAMUSCULAR; INTRAVENOUS at 11:31

## 2018-09-17 RX ADMIN — ACETAMINOPHEN 1000 MG: 500 TABLET, FILM COATED ORAL at 07:54

## 2018-09-17 RX ADMIN — FAMOTIDINE 20 MG: 20 TABLET, FILM COATED ORAL at 20:23

## 2018-09-17 RX ADMIN — SODIUM CHLORIDE, POTASSIUM CHLORIDE, SODIUM LACTATE AND CALCIUM CHLORIDE: 600; 310; 30; 20 INJECTION, SOLUTION INTRAVENOUS at 11:31

## 2018-09-17 RX ADMIN — CEFAZOLIN 2 G: 330 INJECTION, POWDER, FOR SOLUTION INTRAMUSCULAR; INTRAVENOUS at 09:45

## 2018-09-17 RX ADMIN — CEFAZOLIN 1 G: 330 INJECTION, POWDER, FOR SOLUTION INTRAMUSCULAR; INTRAVENOUS at 18:11

## 2018-09-17 RX ADMIN — LIDOCAINE HYDROCHLORIDE 1 EACH: 40 SOLUTION TOPICAL at 09:28

## 2018-09-17 RX ADMIN — LABETALOL HYDROCHLORIDE 5 MG: 5 INJECTION, SOLUTION INTRAVENOUS at 12:26

## 2018-09-17 RX ADMIN — SUCCINYLCHOLINE CHLORIDE 180 MG: 20 INJECTION, SOLUTION INTRAMUSCULAR; INTRAVENOUS at 09:28

## 2018-09-17 RX ADMIN — MIDAZOLAM HYDROCHLORIDE 5 MG: 1 INJECTION, SOLUTION INTRAMUSCULAR; INTRAVENOUS at 09:28

## 2018-09-17 RX ADMIN — LIDOCAINE HYDROCHLORIDE 100 MG: 20 INJECTION, SOLUTION INFILTRATION; PERINEURAL at 09:28

## 2018-09-17 RX ADMIN — Medication 1 TABLET: at 15:36

## 2018-09-17 RX ADMIN — SODIUM CHLORIDE, POTASSIUM CHLORIDE, SODIUM LACTATE AND CALCIUM CHLORIDE 100 ML/HR: 600; 310; 30; 20 INJECTION, SOLUTION INTRAVENOUS at 06:46

## 2018-09-17 RX ADMIN — SUFENTANIL CITRATE 30 MCG: 50 INJECTION, SOLUTION EPIDURAL; INTRAVENOUS at 09:50

## 2018-09-17 RX ADMIN — ATORVASTATIN CALCIUM 10 MG: 10 TABLET, FILM COATED ORAL at 20:23

## 2018-09-17 RX ADMIN — ONDANSETRON HYDROCHLORIDE 4 MG: 2 SOLUTION INTRAMUSCULAR; INTRAVENOUS at 11:31

## 2018-09-17 RX ADMIN — DULOXETINE HYDROCHLORIDE 60 MG: 30 CAPSULE, DELAYED RELEASE ORAL at 15:36

## 2018-09-17 RX ADMIN — OXYCODONE HYDROCHLORIDE AND ACETAMINOPHEN 1 TABLET: 10; 325 TABLET ORAL at 16:42

## 2018-09-17 RX ADMIN — HYDRALAZINE HYDROCHLORIDE 5 MG: 20 INJECTION INTRAMUSCULAR; INTRAVENOUS at 13:49

## 2018-09-17 RX ADMIN — PROPOFOL 200 MG: 10 INJECTION, EMULSION INTRAVENOUS at 09:28

## 2018-09-17 RX ADMIN — SUFENTANIL CITRATE 10 MCG: 50 INJECTION, SOLUTION EPIDURAL; INTRAVENOUS at 09:28

## 2018-09-17 RX ADMIN — PROPOFOL 75 MCG/KG/MIN: 10 INJECTION, EMULSION INTRAVENOUS at 09:23

## 2018-09-17 RX ADMIN — SUFENTANIL CITRATE 50 MCG: 50 INJECTION, SOLUTION EPIDURAL; INTRAVENOUS at 10:08

## 2018-09-17 RX ADMIN — LABETALOL HYDROCHLORIDE 10 MG: 5 INJECTION, SOLUTION INTRAVENOUS at 12:36

## 2018-09-17 RX ADMIN — OXYCODONE HYDROCHLORIDE 20 MG: 20 TABLET, FILM COATED, EXTENDED RELEASE ORAL at 06:54

## 2018-09-17 RX ADMIN — LABETALOL HYDROCHLORIDE 5 MG: 5 INJECTION, SOLUTION INTRAVENOUS at 12:52

## 2018-09-17 RX ADMIN — HYDRALAZINE HYDROCHLORIDE 5 MG: 20 INJECTION INTRAMUSCULAR; INTRAVENOUS at 13:59

## 2018-09-17 RX ADMIN — SODIUM CHLORIDE, POTASSIUM CHLORIDE, SODIUM LACTATE AND CALCIUM CHLORIDE 9 ML/HR: 600; 310; 30; 20 INJECTION, SOLUTION INTRAVENOUS at 06:46

## 2018-09-17 NOTE — ANESTHESIA PROCEDURE NOTES
Airway  Urgency: elective    Airway not difficult    General Information and Staff    Patient location during procedure: OR  CRNA: SRAVANI CHAUDHARI    Indications and Patient Condition  Indications for airway management: airway protection    Preoxygenated: yes  MILS maintained throughout  Mask difficulty assessment: 1 - vent by mask    Final Airway Details  Final airway type: endotracheal airway      Successful airway: ETT  Cuffed: yes   Successful intubation technique: direct laryngoscopy  Facilitating devices/methods: intubating stylet  Endotracheal tube insertion site: oral  Blade: Celaya  Blade size: 2  ETT size: 7.0 mm  Cormack-Lehane Classification: grade I - full view of glottis  Placement verified by: chest auscultation, capnometry and palpation of cuff   Cuff volume (mL): 8  Measured from: teeth  ETT to teeth (cm): 21  Number of attempts at approach: 1

## 2018-09-17 NOTE — ANESTHESIA POSTPROCEDURE EVALUATION
Patient: Blanca Martin    Procedure Summary     Date:  09/17/18 Room / Location:   [facility] OR  /  [facility]    Anesthesia Start:  0917 Anesthesia Stop:  1208    Procedure:  ANTERIOR CERVICAL DISCECTOMY FUSION C4-5, C6-7 WITH INSTRUMENTATION C4-7 (N/A Spine Cervical) Diagnosis:  (M54.12)    Surgeon:  JAXSON Rm MD Provider:  Delia Cadet CRNA    Anesthesia Type:  general ASA Status:  3          Anesthesia Type: general  Last vitals  BP   149/69 (09/17/18 1417)   Temp   97.5 °F (36.4 °C) (09/17/18 1417)   Pulse   86 (09/17/18 1417)   Resp   18 (09/17/18 1417)     SpO2   95 % (09/17/18 1357)     Post Anesthesia Care and Evaluation    Patient location during evaluation: PACU  Patient participation: complete - patient participated  Level of consciousness: awake and alert  Pain management: adequate  Airway patency: patent  Anesthetic complications: No anesthetic complications  PONV Status: controlled  Cardiovascular status: acceptable and hemodynamically stable  Respiratory status: acceptable  Hydration status: acceptable    Comments: Patient discharged from PACU prior to anesthesia evaluation based on Jemima Score.  For details, see RN note.     /69 (BP Location: Left arm, Patient Position: Lying)   Pulse 86   Temp 97.5 °F (36.4 °C) (Oral)   Resp 18   SpO2 95%

## 2018-09-17 NOTE — OP NOTE
CERVICAL DISCECTOMY ANTERIOR FUSION WITH INSTRUMENTATION  Procedure Note    Blanca Martin  9/17/2018    Pre-op Diagnosis:     1.  Status post previous ACDF C5-6, Dr. Rizo, 1989  2.  Chronic right-sided neck pain  3.  Right shoulder radiculopathy  4.  Bilateral hand numbness, right worse than left  5.  Weakness right deltoid  6.  Adjacent level degenerative disc disease C4-5, C6-7  7.  Central and bilateral foraminal stenosis C4-5, C6-7, worse right C6-7    Post-op Diagnosis:    same    Procedure/CPT® Codes:    1.  Anterior cervical discectomy with interbody fusion C4-5, C6-7  2.  Anterior spinal instrumentation C4 to 7 (Alphatec anterior plate and screws)  3.  Use of PEEK interbody biomechanical device for fusion C4-5, C6-7 (Vertera Cohere PEEK spacers × 2)  4.  Use of locally obtained autograft bone for fusion C4 to 7  5.  Use of allograft bone matrix for fusion C4 to 7  6.  Use of operating microscope for decompression and microdissection  7.  Use of fluoroscopy for confirmation of surgical level, placement of instrumentation and PEEK spacers  8.  Intraoperative neural monitoring    Anesthesia: General    Surgeon: KELSEA Rm MD    Assistant: Roberto Hernandez PA-C    Estimated Blood Loss: 50 mL    Complications: None    Condition: Stable to PACU.    Indications:    The patient is a 68-year-old who sees Dr. Siva Lassiter for medical issues.  She presented to the office with a history of a previous ACDF performed at C5-6 in 1989.  Unfortunately over the last several years, she has developed chronic right-sided neck pain as well as pain into her right shoulder she also complained of hand numbness bilaterally.  On exam, she had weakness in her right deltoid.  Imaging studies revealed adjacent level degenerative disc disease above and below her previous fusion, at both C4-5 and C6-7.  Both levels had central and bilateral foraminal stenosis, which was worse on the right at C6-7.    After failing  conservative measures, it was mutually decided that surgery would be the best option.  Risks, benefits, and complications of surgery were discussed with the patient.  The patient appeared well informed and wished to proceed.  We specifically discussed the risks of infection, blood loss, nerve root injury, CSF leak, spinal cord injury, and the possibility of incomplete resolution of symptoms.  We also discussed the possible risk of a nonunion and the potential need for additional surgery in the event of a pseudoarthrosis or hardware failure.    Operative Procedure:    After obtaining informed consent and verifying the correct operative levels, the patient was brought to the operating room and placed supine on an operating table.  A general anesthetic was provided by the anesthesia service with the assistance of an endotracheal tube.  Once this was properly positioned and secured, a bump was placed under the patient's shoulders placing the neck into a gentle extended position.  Head halter traction was then used with 10 pounds weight to maintain head position.  The shoulders were taped using 3 inch wide cloth tape to assist with radiographic visualization.  Fluoroscopy was used to identify the disc spaces from C4 to 7, and the skin was marked for our planned incision.  The anterior neck region was then prepped and draped in usual sterile fashion.  A surgical timeout was taken to confirm this was the correct patient, we are working at the correct levels, and that preoperative antibiotics were given in a timely fashion.    A transverse incision was then created over the anterior cervical region using a 10 blade scalpel directly centered over the levels of interest.  Dissection was carried sharply through subcutaneous tissues.  The platysma was divided in line with the incision and blunt dissection was carried along the medial border of the sternocleidomastoid muscle, lateral to the strap musculature the neck.  The  carotid pulse was then palpated, and dissection was carried medial to the carotid sheath and lateral to the trachea and esophagus.  Handheld Cloward retractors along with Kitners were used to dissect through pretracheal and prevertebral fascia.  A radiographic marker was placed into one of the disc spaces and fluoroscopy was used to confirm that we are indeed at the correct levels.  The longus coli muscles were then elevated from either side of the spine using Bovie cautery.  The previous fusion at C5-6 was noted to be solid.  The scar tissue in this area was also very minimal.    An initial discectomy was started by creating an annulotomy with Bovie cautery.  A Landers elevator was used to remove some of the disc material off of the endplates.  Disc material was initially removed using forward angled curettes and pituitaries.  Some anterior osteophytes were removed using a rongeur.  All retrieved bone was cleaned, morcellized and saved for later packing into the disc spaces to assist with obtaining a fusion.  Cordele pins were then placed to allow gentle distraction across the disc spaces.  The microscope was then positioned for the microdissection and decompression portion of the procedure.    The disc spaces of C4-5 and C6-7 were prepared in an identical fashion.  I used Kerrisons to remove remaining anterior osteophytes off of the endplates.  Straight curettes were used to remove the cartilaginous endplates and all remaining disc material.  The high-speed bur was then used to remove posterior osteophytes and to contour the endplates in preparation for fusion.  A forward angled curette was used to free up the posterior margins of the vertebral bodies, releasing the posterior longitudinal ligament.  Posterior osteophytes, posterior disc material, and the PLL were all resected using Kerrisons.  Kerrisons were also used to perform a bilateral neural foraminotomy.  At the end of the discectomy decompression, the central  spinal canal and the exiting nerve roots at each level appeared to be free of compression.  Bleeding in the epidural space was controlled using thrombin and gelfoam.  The wound was then copiously irrigated with saline solution.    Next, trial spacers were tapped into position into each of the disc spaces.  Once the appropriate size was determined for each disc space, actual PEEK spacers matching the same size as the trials were delivered to the sterile field.  The spacers were packed as tightly as possible with a combination of locally obtained autograft bone and allograft bone matrix.  The spacers were then malleted into position into each of the disc spaces under fluoroscopic guidance.  They were placed as PEEK interbody biomechanical devices to assist with fusion.    After confirming the PEEK spacers were properly positioned with fluoroscopy, the [place] pins were removed.  Remaining anterior osteophytes were contoured off of the vertebral bodies using a combination of the high-speed bur and the Rongeur to allow for the best possible plate fixation.  An anterior plate from the Alphatec instrumentation set was then chosen to span C4 to 7.  This was held into position using a series of screws.    Final fluoroscopy imaging confirmed adequate position of the plate and screws as well as the PEEK spacers.  All implants appeared to be properly positioned with good maintenance of cervical alignment.  A final inspection of the operative field was then undertaken to ensure that we had adequate hemostasis.  Bleeding at this point was controlled with thrombin, gelfoam and bipolar cautery.  A drain was then placed anterior to the spine exiting through a poke hole inferior of the incision.    Closure was accomplished by reapproximating the platysma with a 3-0 Vicryl.  Immediate subcutaneous tissues were reapproximated with a 4-0 Vicryl in a buried fashion.  Final skin closure was accomplished with Mastisol and Steri-Strips.   The wound was then washed and a sterile Bioclusive dressing was applied.  The patient was then placed into a hard cervical collar, extubated, and sent to the recovery room in good stable condition.    The patient tolerated the procedure well.  There were no complications.  We estimated blood loss to be 100 mL.  Intraoperative neural monitoring was used during the procedure.  We used motor evoked potentials, free run EMG, and SSEPs.  There were no neuro monitoring signal changes during the procedure.    Roberto Hernandez PA-C provided critical assistance during the procedure.  His assistance was medically necessary in order to allow the procedure to occur in the most safe and efficient manner.  He assisted with not only agent positioning and wound closure, but more importantly with retraction of delicate neurovascular structures, assistance during the discectomy decompression, as well as the placement of the PEEK spacers and instrumentation to obtain a fusion.    KELSEA Rm MD     Date: 9/17/2018  Time: 4:01 PM

## 2018-09-17 NOTE — ANESTHESIA PREPROCEDURE EVALUATION
Anesthesia Evaluation     Patient summary reviewed   no history of anesthetic complications:  NPO Solid Status: > 8 hours  NPO Liquid Status: > 8 hours           Airway   Mallampati: I  TM distance: >3 FB  Neck ROM: limited  Comment: Limited ROM 2/2 worsening symptoms and pain  Dental          Pulmonary    (+) a smoker Current Abstained day of surgery, sleep apnea,   (-) asthma  Cardiovascular   Exercise tolerance: (Limited by joint and back pain, denies chest pain)    ECG reviewed    (+) hypertension, PVD, DVT, hyperlipidemia,   (-) past MI, angina, CHF, cardiac stents      Neuro/Psych  (+) numbness (right 4th-5th finger),     (-) seizures, TIA, CVA  GI/Hepatic/Renal/Endo    (+)   renal disease CRI, diabetes mellitus,   (-) liver disease    Musculoskeletal     Abdominal    Substance History      OB/GYN          Other                      Anesthesia Plan    ASA 3     general     intravenous induction   Anesthetic plan, all risks, benefits, and alternatives have been provided, discussed and informed consent has been obtained with: patient.

## 2018-09-17 NOTE — PLAN OF CARE
Problem: Patient Care Overview  Goal: Plan of Care Review  Outcome: Ongoing (interventions implemented as appropriate)   09/17/18 6081   Coping/Psychosocial   Plan of Care Reviewed With patient   Plan of Care Review   Progress improving   OTHER   Outcome Summary A&Ox4. Pain controlled with ordered meds. Bascom in place, removed frequently to assess neck and incisional area. Dressing c/d/i. Voids per bsc, assist x1 standby.        Problem: Laminectomy/Foraminotomy/Discectomy (Adult)  Goal: Signs and Symptoms of Listed Potential Problems Will be Absent, Minimized or Managed (Laminectomy/Foraminotomy/Discectomy)  Outcome: Ongoing (interventions implemented as appropriate)    Goal: Anesthesia/Sedation Recovery  Outcome: Ongoing (interventions implemented as appropriate)      Problem: Pain, Acute (Adult)  Goal: Identify Related Risk Factors and Signs and Symptoms  Outcome: Ongoing (interventions implemented as appropriate)    Goal: Acceptable Pain Control/Comfort Level  Outcome: Ongoing (interventions implemented as appropriate)      Problem: Fall Risk (Adult)  Goal: Identify Related Risk Factors and Signs and Symptoms  Outcome: Ongoing (interventions implemented as appropriate)    Goal: Absence of Fall  Outcome: Ongoing (interventions implemented as appropriate)

## 2018-09-18 VITALS
OXYGEN SATURATION: 98 % | HEART RATE: 78 BPM | SYSTOLIC BLOOD PRESSURE: 137 MMHG | WEIGHT: 197 LBS | RESPIRATION RATE: 18 BRPM | DIASTOLIC BLOOD PRESSURE: 62 MMHG | TEMPERATURE: 97.5 F | BODY MASS INDEX: 33.63 KG/M2 | HEIGHT: 64 IN

## 2018-09-18 LAB
ANION GAP SERPL CALCULATED.3IONS-SCNC: 6 MMOL/L (ref 4–13)
BASOPHILS # BLD AUTO: 0.01 10*3/MM3 (ref 0–0.2)
BASOPHILS NFR BLD AUTO: 0.1 % (ref 0–2)
BUN BLD-MCNC: 13 MG/DL (ref 5–21)
BUN/CREAT SERPL: 14 (ref 7–25)
CALCIUM SPEC-SCNC: 8.4 MG/DL (ref 8.4–10.4)
CHLORIDE SERPL-SCNC: 104 MMOL/L (ref 98–110)
CO2 SERPL-SCNC: 30 MMOL/L (ref 24–31)
CREAT BLD-MCNC: 0.93 MG/DL (ref 0.5–1.4)
DEPRECATED RDW RBC AUTO: 48.6 FL (ref 40–54)
EOSINOPHIL # BLD AUTO: 0.06 10*3/MM3 (ref 0–0.7)
EOSINOPHIL NFR BLD AUTO: 0.7 % (ref 0–4)
ERYTHROCYTE [DISTWIDTH] IN BLOOD BY AUTOMATED COUNT: 13.5 % (ref 12–15)
GFR SERPL CREATININE-BSD FRML MDRD: 60 ML/MIN/1.73
GLUCOSE BLD-MCNC: 192 MG/DL (ref 70–100)
HCT VFR BLD AUTO: 35.1 % (ref 37–47)
HGB BLD-MCNC: 11.4 G/DL (ref 12–16)
IMM GRANULOCYTES # BLD: 0.02 10*3/MM3 (ref 0–0.03)
IMM GRANULOCYTES NFR BLD: 0.2 % (ref 0–5)
LYMPHOCYTES # BLD AUTO: 2.03 10*3/MM3 (ref 0.72–4.86)
LYMPHOCYTES NFR BLD AUTO: 23.4 % (ref 15–45)
MCH RBC QN AUTO: 32.3 PG (ref 28–32)
MCHC RBC AUTO-ENTMCNC: 32.5 G/DL (ref 33–36)
MCV RBC AUTO: 99.4 FL (ref 82–98)
MONOCYTES # BLD AUTO: 0.58 10*3/MM3 (ref 0.19–1.3)
MONOCYTES NFR BLD AUTO: 6.7 % (ref 4–12)
NEUTROPHILS # BLD AUTO: 5.99 10*3/MM3 (ref 1.87–8.4)
NEUTROPHILS NFR BLD AUTO: 68.9 % (ref 39–78)
NRBC BLD MANUAL-RTO: 0 /100 WBC (ref 0–0)
PLATELET # BLD AUTO: 206 10*3/MM3 (ref 130–400)
PMV BLD AUTO: 9.4 FL (ref 6–12)
POTASSIUM BLD-SCNC: 4.6 MMOL/L (ref 3.5–5.3)
RBC # BLD AUTO: 3.53 10*6/MM3 (ref 4.2–5.4)
SODIUM BLD-SCNC: 140 MMOL/L (ref 135–145)
WBC NRBC COR # BLD: 8.69 10*3/MM3 (ref 4.8–10.8)

## 2018-09-18 PROCEDURE — G8988 SELF CARE GOAL STATUS: HCPCS | Performed by: OCCUPATIONAL THERAPIST

## 2018-09-18 PROCEDURE — 80048 BASIC METABOLIC PNL TOTAL CA: CPT | Performed by: ORTHOPAEDIC SURGERY

## 2018-09-18 PROCEDURE — 94799 UNLISTED PULMONARY SVC/PX: CPT

## 2018-09-18 PROCEDURE — 85025 COMPLETE CBC W/AUTO DIFF WBC: CPT | Performed by: ORTHOPAEDIC SURGERY

## 2018-09-18 PROCEDURE — G8987 SELF CARE CURRENT STATUS: HCPCS | Performed by: OCCUPATIONAL THERAPIST

## 2018-09-18 PROCEDURE — 97165 OT EVAL LOW COMPLEX 30 MIN: CPT | Performed by: OCCUPATIONAL THERAPIST

## 2018-09-18 PROCEDURE — 25010000003 CEFAZOLIN PER 500 MG: Performed by: ORTHOPAEDIC SURGERY

## 2018-09-18 PROCEDURE — 99406 BEHAV CHNG SMOKING 3-10 MIN: CPT

## 2018-09-18 PROCEDURE — G8989 SELF CARE D/C STATUS: HCPCS | Performed by: OCCUPATIONAL THERAPIST

## 2018-09-18 RX ORDER — OXYCODONE AND ACETAMINOPHEN 10; 325 MG/1; MG/1
1 TABLET ORAL EVERY 6 HOURS PRN
Start: 2018-09-18 | End: 2018-09-27

## 2018-09-18 RX ADMIN — FAMOTIDINE 20 MG: 20 TABLET, FILM COATED ORAL at 08:42

## 2018-09-18 RX ADMIN — BISOPROLOL FUMARATE 5 MG: 5 TABLET ORAL at 08:42

## 2018-09-18 RX ADMIN — Medication 1 TABLET: at 08:42

## 2018-09-18 RX ADMIN — CEFAZOLIN 1 G: 330 INJECTION, POWDER, FOR SOLUTION INTRAMUSCULAR; INTRAVENOUS at 03:07

## 2018-09-18 RX ADMIN — OXYCODONE HYDROCHLORIDE AND ACETAMINOPHEN 1 TABLET: 10; 325 TABLET ORAL at 05:24

## 2018-09-18 RX ADMIN — DULOXETINE HYDROCHLORIDE 60 MG: 30 CAPSULE, DELAYED RELEASE ORAL at 08:42

## 2018-09-18 RX ADMIN — SODIUM CHLORIDE 75 ML/HR: 9 INJECTION, SOLUTION INTRAVENOUS at 03:07

## 2018-09-18 RX ADMIN — GLIPIZIDE 2.5 MG: 5 TABLET ORAL at 08:42

## 2018-09-18 NOTE — PLAN OF CARE
Problem: Patient Care Overview  Goal: Plan of Care Review  Outcome: Ongoing (interventions implemented as appropriate)   09/18/18 0805   Coping/Psychosocial   Plan of Care Reviewed With patient   Plan of Care Review   Progress no change   OTHER   Outcome Summary OT ami completed. Pt. performed all ADLs, adjustmen of cervical collar, & fxl mobility at S. She reports no pain, numbness, or difficulty postoperatively. Ms. Martin lives alone & I rec she have intermittant assist for bathing & IADLs. She has DC orders this date & plans to return home. No further OT tx 2' DC this date.

## 2018-09-18 NOTE — THERAPY DISCHARGE NOTE
Acute Care - Occupational Therapy Initial Eval/Discharge  [facility]     Patient Name: Blanca Martin  : 1950  MRN: [id number]  Today's Date: 2018  Onset of Illness/Injury or Date of Surgery: 18  Date of Referral to OT: 18  Referring Physician: Dr. Rm      Admit Date: 2018       ICD-10-CM ICD-9-CM   1. Potential for self care deficit Z91.89 V49.89     Patient Active Problem List   Diagnosis   • Stenosis, cervical spine     Past Medical History:   Diagnosis Date   • Arthritis    • Chronic back pain    • Chronic kidney disease     stage 2   • Diabetes mellitus (CMS/HCC)    • H/O blood clots    • Hyperlipidemia    • Hypertension    • PONV (postoperative nausea and vomiting)    • Spinal headache    • Vascular disease      Past Surgical History:   Procedure Laterality Date   • APPENDECTOMY     • BACK SURGERY     • CARPAL TUNNEL RELEASE     • CERVICAL FUSION      C5-6   •  SECTION     • CHOLECYSTECTOMY     • GANGLION CYST EXCISION Right    • HERNIA REPAIR      VENTRAL    • HYSTERECTOMY     • JOINT REPLACEMENT Bilateral 2012   • KNEE SURGERY     • LEG SURGERY      blood clot removed   • LUMBAR FUSION  2010    L2-5/L5S1   • NASAL SEPTUM SURGERY  2005   • RADIOFREQUENCY ABLATION Right    • SHOULDER SURGERY     • TONSILLECTOMY     • TRIGGER FINGER RELEASE Left    • VEIN LIGATION AND STRIPPING Left           OT ASSESSMENT FLOWSHEET (last 72 hours)      Occupational Therapy Evaluation     Row Name 18 0805                   OT Evaluation Time/Intention    Subjective Information no complaints  -        Document Type evaluation  -        Mode of Treatment occupational therapy  -           General Information    Patient Profile Reviewed? yes  -        Onset of Illness/Injury or Date of Surgery 18  -        Referring Physician Dr. Rm  [facility]        Patient Observations alert;cooperative;agree to therapy  -        General  Observations of Patient mary jane quesada SCDs, IVs, c collar on  -        Prior Level of Function independent:;all household mobility;community mobility;ADL's;home management;cooking  -        Pertinent History of Current Functional Problem Anterior cervical discectomy with interbody fusion C4-5, C6-7; Anterior spinal instrumentation C4 to 7;   -        Existing Precautions/Restrictions fall;spinal  -        Risks Reviewed patient:;LOB;increased discomfort  -        Benefits Reviewed patient:;improve function;increase independence;increase strength;increase balance  -           Relationship/Environment    Lives With alone  -           Resource/Environmental Concerns    Current Living Arrangements home/apartment/condo  -           Home Main Entrance    Number of Stairs, Main Entrance one  -           Cognitive Assessment/Interventions    Additional Documentation Cognitive Assessment/Intervention (Group)  -           Cognitive Assessment/Intervention- PT/OT    Orientation Status (Cognition) oriented x 4  -CH        Follows Commands (Cognition) WNL  -        Cognitive Function (Cognitive) WNL  -        Personal Safety Interventions gait belt;muscle strengthening facilitated;nonskid shoes/slippers when out of bed;supervised activity;toileting scheduled  -           Bed Mobility Assessment/Treatment    Bed Mobility Assessment/Treatment rolling right;sidelying-sit  -        Rolling Right [place] (Bed Mobility) conditional independence  -        Sidelying-Sit [place] (Bed Mobility) supervision  -        Assistive Device (Bed Mobility) bed rails  -           Functional Mobility    Functional Mobility- Ind. Level supervision required  -        Functional Mobility- Comment in room  -           Transfer Assessment/Treatment    Transfer Assessment/Treatment sit-stand transfer;stand-sit transfer  -           Sit-Stand Transfer    Sit-Stand [place] (Transfers) supervision  -            Stand-Sit Transfer    Stand-Sit [place] (Transfers) supervision  -           ADL Assessment/Intervention    BADL Assessment/Intervention lower body dressing;feeding  -           Lower Body Dressing Assessment/Training    Lower Body Dressing [place] Level conditional independence;don;doff;socks  -        Lower Body Dressing Position edge of bed sitting  -           Self-Feeding Assessment/Training    [place] Level (Feeding) independent;scoop food and bring to mouth;prepare tray/open items;finger foods  -        Position (Self-Feeding) supported sitting  -           General ROM    GENERAL ROM COMMENTS BUE AROM WFL, R shoulder achieves 75% AROM   -           MMT (Manual Muscle Testing)    General MMT Comments no formal testing performed 2' lifting restriction  -           Motor Assessment/Interventions    Additional Documentation Balance (Group)  -           Balance    Balance --   Pt. S for all static & dynamic balance activities  -           Sensory Assessment/Intervention    Sensory General Assessment no sensation deficits identified  -           Positioning and Restraints    Pre-Treatment Position in bed  -        Post Treatment Position chair  -        In Chair sitting;call light within reach;encouraged to call for assist;with brace  -           Pain Assessment    Additional Documentation Pain Scale: Numbers Pre/Post-Treatment (Group)  -           Pain Scale: Numbers Pre/Post-Treatment    Pain Scale: Numbers, Pretreatment 0/10 - no pain  -           Orthotics & Prosthetics Management    Orthosis Location spinal orthosis  -        Additional Documentation Orthosis Location (Row)  -           Spinal Orthosis Management    Type (Spinal Orthosis) cervical collar, hard  -        Functional Design (Spinal Orthosis) static orthosis  -        Wearing Schedule (Spinal Orthosis) wear full time  -        Orthosis Training (Spinal Orthosis) patient;activity  limitations/precautions;cleaning/care of orthosis;donning/doffing orthosis;orthosis adjustment;orthosis maintenance;purpose/goals of orthosis;wearing schedule  -CH           Wound 09/17/18 1138 Other (See comments) neck incision    Wound - Properties Group Date first assessed: 09/17/18  -ND Time first assessed: 1138  -ND Side: Other (See comments)  -ND Location: neck  -ND Type: incision  -ND       Plan of Care Review    Plan of Care Reviewed With patient  -           Clinical Impression (OT)    Date of Referral to OT 09/17/18  -        Patient/Family Goals Statement (OT Eval) return home  -        Criteria for Skilled Therapeutic Interventions Met (OT Eval) no;treatment indicated   Pt. has DC orders  -        Therapy Frequency (OT Eval) evaluation only  -        Care Plan Review (OT) evaluation/treatment results reviewed;care plan/treatment goals reviewed;risks/benefits reviewed;patient/other agree to care plan;current/potential barriers reviewed  -        Anticipated Discharge Disposition (OT) home;home with home health  -           Discharge Summary (Occupational Therapy)    Additional Documentation Discharge Summary, OT Eval (Group)  -           Discharge Summary, OT Eval    Reason for Discharge (OT Discharge Summary) patient discharged from this facility  -        Outcomes Achieved Upon Discharge (OT Discharge Summary) discharge from facility occurred on same date as evaluation  -           Living Environment    Home Accessibility stairs to enter home  -          User Key  (r) = Recorded By, (t) = Taken By, (c) = Cosigned By    Initials Name Effective Dates     Devika Ly OTR/L 08/02/16 -     ND Roderick Mesa RN 12/26/17 -               OT Recommendation and Plan  Outcome Summary/Treatment Plan (OT)  Anticipated Discharge Disposition (OT): home, home with home health  Reason for Discharge (OT Discharge Summary): patient discharged from this facility  Therapy Frequency (OT  Donnie): evaluation only  Plan of Care Review  Plan of Care Reviewed With: patient  Plan of Care Reviewed With: patient  Outcome Summary: OT donnie completed. Pt. performed all ADLs, adjustmen of cervical collar, & fxl mobility at S.  She reports no pain, numbness, or difficulty postoperatively.  Ms. Martin lives alone & I rec she have intermittant assist for bathing & IADLs.  She has DC orders this date & plans to return home.  No further OT tx 2' DC this date.                 Outcome Measures     Row Name 09/18/18 0805             How much help from another is currently needed...    Putting on and taking off regular lower body clothing? 4  -CH      Bathing (including washing, rinsing, and drying) 3  -CH      Toileting (which includes using toilet bed pan or urinal) 4  -CH      Putting on and taking off regular upper body clothing 4  -CH      Taking care of personal grooming (such as brushing teeth) 4  -CH      Eating meals 4  -CH      Score 23  -CH         Functional Assessment    Outcome Measure Options AM-PAC 6 Clicks Daily Activity (OT)  -        User Key  (r) = Recorded By, (t) = Taken By, (c) = Cosigned By    Initials Name Provider Type    Devika Paulino OTR/L Occupational Therapist          Time Calculation:         Time Calculation- OT     Row Name 09/18/18 0805             Time Calculation- OT    OT Start Time 0805  -CH      OT Stop Time 0833  -      OT Time Calculation (min) 28 min  -      OT Received On 09/18/18  -        User Key  (r) = Recorded By, (t) = Taken By, (c) = Cosigned By    Initials Name Provider Type    Devika Paulino OTR/L Occupational Therapist        Therapy Suggested Charges     Code   Minutes Charges    None           Therapy Charges for Today     Code Description Service Date Service Provider Modifiers Qty    76440174071  OT SELFCARE CURRENT 9/18/2018 Devika Ly OTR/L GO, CI 1    86101313283 HC OT SELFCARE PROJECTED 9/18/2018 Devika Ly OTR/SEAMUS GO, CI 1     58586489198 HC OT SELFCARE DISCHARGE 9/18/2018 Devika Ly OTR/L GO, CI 1    [id number] HC OT EVAL LOW COMPLEXITY 2 9/18/2018 Devika Ly OTR/L GO, KX 1          OT G-codes  OT Professional Judgement Used?: Yes  OT Functional Scales Options: AM-PAC 6 Clicks Daily Activity (OT)  Score: 23  Functional Limitation: Self care  Self Care Current Status (): At least 1 percent but less than 20 percent impaired, limited or restricted  Self Care Goal Status (): At least 1 percent but less than 20 percent impaired, limited or restricted  Self Care Discharge Status (): At least 1 percent but less than 20 percent impaired, limited or restricted    OT Discharge Summary  Anticipated Discharge Disposition (OT): home, home with home health  Reason for Discharge: Discharge from facility  Outcomes Achieved: Refer to plan of care for updates on goals achieved  Discharge Destination: Home, Home with home health    LEYDI Bobo/SEAMUS  9/18/2018

## 2018-09-18 NOTE — PLAN OF CARE
Problem: Patient Care Overview  Goal: Plan of Care Review  Outcome: Ongoing (interventions implemented as appropriate)   09/18/18 0345   Coping/Psychosocial   Plan of Care Reviewed With patient   Plan of Care Review   Progress no change   OTHER   Outcome Summary PT A&O. Drsg CDI. No NV changes.        Problem: Laminectomy/Foraminotomy/Discectomy (Adult)  Goal: Signs and Symptoms of Listed Potential Problems Will be Absent, Minimized or Managed (Laminectomy/Foraminotomy/Discectomy)  Outcome: Ongoing (interventions implemented as appropriate)      Problem: Fall Risk (Adult)  Goal: Identify Related Risk Factors and Signs and Symptoms  Outcome: Outcome(s) achieved Date Met: 09/18/18    Goal: Absence of Fall  Outcome: Ongoing (interventions implemented as appropriate)

## 2018-09-18 NOTE — DISCHARGE SUMMARY
Date of Discharge:  9/18/2018    Admission Diagnosis: M54.12    Discharge Diagnosis:   1.  Status post previous ACDF C5-6, Dr. Rizo, 1989  2.  Chronic right-sided neck pain  3.  Right shoulder radiculopathy  4.  Bilateral hand numbness, right worse than left  5.  Weakness right deltoid  6.  Adjacent level degenerative disc disease C4-5, C6-7  7.  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 Status post  ACDF C4-5, C6-7, Anterior spinal instrumentation C4 to 7, 9/17/2018    Consults During Admission: none    Hospital Course  Patient is a 68 y.o. female Known to our practice. Admitted for the above fusion.  This has been well tolerated and the patient will be discharged home today in good stable condition with instructions for brace at all times.  No driving until directed.  Patient will follow-up with Dr. Rm's clinic in two weeks. They will call if problems arise.       Condition on Discharge:  STABLE    Vital Signs  Temp:  [97.5 °F (36.4 °C)-98.7 °F (37.1 °C)] 98.5 °F (36.9 °C)  Heart Rate:  [77-88] 81  Resp:  [12-22] 20  BP: (139-197)/(48-97) 158/71    Physical Exam:   Alert and oriented ×3, no acute distress, grossly neurovascularly intact, vital signs stable, dressing clean dry and intact, moving all extremities without focal deficit    Discharge Disposition  Home or Self Care    Discharge Medications     Discharge Medications      New Medications      Instructions Start Date   oxyCODONE-acetaminophen  MG per tablet  Commonly known as:  PERCOCET  Replaces:  oxyCODONE-acetaminophen 5-325 MG per tablet   1 tablet, Oral, Every 6 Hours PRN         Continue These Medications      Instructions Start Date   aspirin 81 MG tablet   Does not apply      atorvastatin 20 MG tablet  Commonly known as:  LIPITOR   Oral      bisoprolol 5 MG tablet  Commonly known as:  ZEBeta   5 mg, Oral, Daily      busPIRone 15 MG tablet  Commonly known as:  BUSPAR   15 mg, Oral, Daily PRN      Calcium  Plus D3 Absorbable 600-2500 MG-UNIT capsule   1 tablet, Oral, Daily      CloNIDine 0.1 MG tablet  Commonly known as:  CATAPRES   0.1 mg, Oral, Daily PRN      DULoxetine 60 MG capsule  Commonly known as:  CYMBALTA   60 mg, Oral, Daily      glimepiride 2 MG tablet  Commonly known as:  AMARYL   2 mg, Oral, Daily PRN      metFORMIN 1000 MG tablet  Commonly known as:  GLUCOPHAGE   1,000 mg, Oral, 2 Times Daily      omeprazole 40 MG capsule  Commonly known as:  priLOSEC   40 mg, Oral, As Needed      tiZANidine 4 MG tablet  Commonly known as:  ZANAFLEX   4 mg, Oral, Every 8 Hours PRN      WOMENS DAILY FORMULA tablet   1 tablet, Oral, Daily         Stop These Medications    DICLOFENAC PO     oxyCODONE-acetaminophen 5-325 MG per tablet  Commonly known as:  PERCOCET  Replaced by:  oxyCODONE-acetaminophen  MG per tablet            Discharge Diet: Resume Home diet, advance as tolerated    Activity at Discharge: Resume home activity advace as tolerated, no lifting, no twisting, no bending, brace as directed, no driving until directed.     Follow-up Appointments  Followup with PCP within one week  Followup [facility] at 2weeks post-op         Roberto Hernandez PA-C  09/18/18  7:56 AM

## 2018-11-09 ENCOUNTER — HOSPITAL ENCOUNTER (OUTPATIENT)
Dept: WOMENS IMAGING | Age: 68
Discharge: HOME | End: 2018-11-09
Payer: MEDICARE

## 2018-11-09 DIAGNOSIS — Z12.39 BREAST CANCER SCREENING: ICD-10-CM

## 2018-11-09 PROCEDURE — 77063 BREAST TOMOSYNTHESIS BI: CPT

## 2018-12-03 ENCOUNTER — NURSE TRIAGE (OUTPATIENT)
Dept: CALL CENTER | Facility: HOSPITAL | Age: 68
End: 2018-12-03

## 2018-12-03 NOTE — TELEPHONE ENCOUNTER
"Was started on a new B/P Nifedipine, a few weeks ago but she has stopped taking it she feels it is causing side effects of  \"Pancreatitis\" because she has stomach problems.  She has Clonidine that she can take for B/p but doesn't know how often she can take the medication.  I asked her to read what it says on the bottle and it says 1 pill 2 times a day for b/p > 160/120.  Advised caller she can take 2 pills per 24 hours.  She has an appt to see Dr Lassiter tomorrow morning. She will take a Clonidine now and will recheck her B/P.  Reviewed S/S to report and will call again if needed.     Reason for Disposition  • Systolic BP  >= 180 OR Diastolic >= 110    Additional Information  • Negative: Difficult to awaken or acting confused (e.g., disoriented, slurred speech)  • Negative: Severe difficulty breathing (e.g., struggling for each breath, speaks in single words)  • Negative: [1] Weakness of the face, arm or leg on one side of the body AND [2] new onset  • Negative: [1] Numbness (i.e., loss of sensation) of the face, arm or leg on one side of the body AND [2] new onset  • Negative: [1] Chest pain lasts > 5 minutes AND [2] history of heart disease  (i.e., heart attack, bypass surgery, angina, angioplasty, CHF)  • Negative: [1] Chest pain AND [2] took nitrogylcerin AND [3] pain was not relieved  • Negative: Sounds like a life-threatening emergency to the triager  • Negative: Symptom is main concern  (e.g., headache, chest pain)  • Negative: Low blood pressure is main concern  • Negative: [1] Systolic BP  >= 160 OR Diastolic >= 100 AND [2] cardiac or neurologic symptoms (e.g., chest pain, difficulty breathing, unsteady gait, blurred vision)  • Negative: [1] Pregnant > 20 weeks AND [2] new hand or face swelling  • Negative: [1] Pregnant > 20 weeks AND [2] BP Systolic BP  >= 140 OR Diastolic >= 90  • Negative: [1] Systolic BP  >= 200 OR Diastolic >= 120  AND [2] having NO cardiac or neurologic symptoms  • Negative: [1] " "Systolic BP  >= 180 OR Diastolic >= 110 AND [2] missed most recent dose of blood pressure medication    Answer Assessment - Initial Assessment Questions  1. BLOOD PRESSURE: \"What is the blood pressure?\" \"Did you take at least two measurements 5 minutes apart?\"   192/96   201/99 75  2. ONSET: \"When did you take your blood pressure?\"      A few minutes ago  3. HOW: \"How did you obtain the blood pressure?\" (e.g., visiting nurse, automatic home BP monitor)     automatic cuff  4. HISTORY: \"Do you have a history of high blood pressure?\"      yes  5. MEDICATIONS: \"Are you taking any medications for blood pressure?\" \"Have you missed any doses recently?\"      Has stopped taking new medication prescribed a few weeks ago  6. OTHER SYMPTOMS: \"Do you have any symptoms?\" (e.g., headache, chest pain, blurred vision, difficulty breathing, weakness)     dizzy  7. PREGNANCY: \"Is there any chance you are pregnant?\" \"When was your last menstrual period?\"      no    Protocols used: HIGH BLOOD PRESSURE-ADULT-AH      "

## 2018-12-04 ENCOUNTER — HOSPITAL ENCOUNTER (OUTPATIENT)
Dept: CT IMAGING | Age: 68
Discharge: HOME | DRG: 439 | End: 2018-12-04
Payer: MEDICARE

## 2018-12-04 DIAGNOSIS — R10.9 ABDOMINAL PAIN, UNSPECIFIED ABDOMINAL LOCATION: ICD-10-CM

## 2018-12-04 LAB
ALBUMIN SERPL-MCNC: 4.5 G/DL (ref 3.5–5.2)
ALP BLD-CCNC: 83 U/L (ref 35–104)
ALT SERPL-CCNC: 18 U/L (ref 5–33)
AMYLASE: 75 U/L (ref 28–100)
ANION GAP SERPL CALCULATED.3IONS-SCNC: 15 MMOL/L (ref 7–19)
AST SERPL-CCNC: 11 U/L (ref 5–32)
BILIRUB SERPL-MCNC: <0.2 MG/DL (ref 0.2–1.2)
BUN BLDV-MCNC: 17 MG/DL (ref 8–23)
CALCIUM SERPL-MCNC: 9.9 MG/DL (ref 8.8–10.2)
CHLORIDE BLD-SCNC: 98 MMOL/L (ref 98–111)
CO2: 24 MMOL/L (ref 22–29)
CREAT SERPL-MCNC: 1 MG/DL (ref 0.5–0.9)
GFR NON-AFRICAN AMERICAN: 55
GLUCOSE BLD-MCNC: 209 MG/DL (ref 74–109)
LIPASE: 111 U/L (ref 13–60)
POTASSIUM SERPL-SCNC: 5.1 MMOL/L (ref 3.5–5)
SODIUM BLD-SCNC: 137 MMOL/L (ref 136–145)
TOTAL PROTEIN: 7.9 G/DL (ref 6.6–8.7)

## 2018-12-04 PROCEDURE — 6360000004 HC RX CONTRAST MEDICATION: Performed by: NURSE PRACTITIONER

## 2018-12-04 PROCEDURE — 74177 CT ABD & PELVIS W/CONTRAST: CPT

## 2018-12-04 RX ADMIN — IOPAMIDOL 75 ML: 755 INJECTION, SOLUTION INTRAVENOUS at 14:43

## 2018-12-06 ENCOUNTER — HOSPITAL ENCOUNTER (INPATIENT)
Age: 68
LOS: 3 days | Discharge: HOME | DRG: 439 | End: 2018-12-10
Attending: EMERGENCY MEDICINE | Admitting: FAMILY MEDICINE
Payer: MEDICARE

## 2018-12-06 ENCOUNTER — NURSE TRIAGE (OUTPATIENT)
Dept: CALL CENTER | Facility: HOSPITAL | Age: 68
End: 2018-12-06

## 2018-12-06 DIAGNOSIS — K85.90 ACUTE PANCREATITIS, UNSPECIFIED COMPLICATION STATUS, UNSPECIFIED PANCREATITIS TYPE: Primary | ICD-10-CM

## 2018-12-06 LAB
ALBUMIN SERPL-MCNC: 4 G/DL (ref 3.5–5.2)
ALP BLD-CCNC: 84 U/L (ref 35–104)
ALT SERPL-CCNC: 16 U/L (ref 5–33)
ANION GAP SERPL CALCULATED.3IONS-SCNC: 13 MMOL/L (ref 7–19)
AST SERPL-CCNC: 14 U/L (ref 5–32)
BACTERIA: ABNORMAL /HPF
BASOPHILS ABSOLUTE: 0 K/UL (ref 0–0.2)
BASOPHILS RELATIVE PERCENT: 0.4 % (ref 0–1)
BILIRUB SERPL-MCNC: 0.3 MG/DL (ref 0.2–1.2)
BILIRUBIN URINE: NEGATIVE
BLOOD, URINE: NEGATIVE
BUN BLDV-MCNC: 19 MG/DL (ref 8–23)
CALCIUM SERPL-MCNC: 9.7 MG/DL (ref 8.8–10.2)
CHLORIDE BLD-SCNC: 93 MMOL/L (ref 98–111)
CLARITY: ABNORMAL
CO2: 23 MMOL/L (ref 22–29)
COLOR: YELLOW
CREAT SERPL-MCNC: 1 MG/DL (ref 0.5–0.9)
EOSINOPHILS ABSOLUTE: 0.1 K/UL (ref 0–0.6)
EOSINOPHILS RELATIVE PERCENT: 1 % (ref 0–5)
EPITHELIAL CELLS, UA: ABNORMAL /HPF
GFR NON-AFRICAN AMERICAN: 55
GLUCOSE BLD-MCNC: 192 MG/DL (ref 74–109)
GLUCOSE URINE: NEGATIVE MG/DL
HCT VFR BLD CALC: 42.2 % (ref 37–47)
HEMOGLOBIN: 13.8 G/DL (ref 12–16)
KETONES, URINE: ABNORMAL MG/DL
LEUKOCYTE ESTERASE, URINE: NEGATIVE
LIPASE: 89 U/L (ref 13–60)
LYMPHOCYTES ABSOLUTE: 2.3 K/UL (ref 1.1–4.5)
LYMPHOCYTES RELATIVE PERCENT: 25.1 % (ref 20–40)
MCH RBC QN AUTO: 31.7 PG (ref 27–31)
MCHC RBC AUTO-ENTMCNC: 32.7 G/DL (ref 33–37)
MCV RBC AUTO: 97 FL (ref 81–99)
MONOCYTES ABSOLUTE: 0.6 K/UL (ref 0–0.9)
MONOCYTES RELATIVE PERCENT: 6.7 % (ref 0–10)
NEUTROPHILS ABSOLUTE: 6.1 K/UL (ref 1.5–7.5)
NEUTROPHILS RELATIVE PERCENT: 66.4 % (ref 50–65)
NITRITE, URINE: NEGATIVE
PDW BLD-RTO: 13.2 % (ref 11.5–14.5)
PH UA: 6
PLATELET # BLD: 297 K/UL (ref 130–400)
PMV BLD AUTO: 9.1 FL (ref 9.4–12.3)
POTASSIUM SERPL-SCNC: 5.3 MMOL/L (ref 3.5–5)
PROTEIN UA: 30 MG/DL
RBC # BLD: 4.35 M/UL (ref 4.2–5.4)
SODIUM BLD-SCNC: 129 MMOL/L (ref 136–145)
SPECIFIC GRAVITY UA: 1.02
TOTAL PROTEIN: 7.7 G/DL (ref 6.6–8.7)
URINE REFLEX TO CULTURE: ABNORMAL
UROBILINOGEN, URINE: 0.2 E.U./DL
WBC # BLD: 9.2 K/UL (ref 4.8–10.8)
YEAST: ABNORMAL /HPF

## 2018-12-06 PROCEDURE — 6360000002 HC RX W HCPCS: Performed by: EMERGENCY MEDICINE

## 2018-12-06 PROCEDURE — 85025 COMPLETE CBC W/AUTO DIFF WBC: CPT

## 2018-12-06 PROCEDURE — 99284 EMERGENCY DEPT VISIT MOD MDM: CPT

## 2018-12-06 PROCEDURE — 2580000003 HC RX 258: Performed by: EMERGENCY MEDICINE

## 2018-12-06 PROCEDURE — 96374 THER/PROPH/DIAG INJ IV PUSH: CPT

## 2018-12-06 PROCEDURE — 96375 TX/PRO/DX INJ NEW DRUG ADDON: CPT

## 2018-12-06 PROCEDURE — 81001 URINALYSIS AUTO W/SCOPE: CPT

## 2018-12-06 PROCEDURE — 36415 COLL VENOUS BLD VENIPUNCTURE: CPT

## 2018-12-06 PROCEDURE — 80053 COMPREHEN METABOLIC PANEL: CPT

## 2018-12-06 PROCEDURE — 83690 ASSAY OF LIPASE: CPT

## 2018-12-06 RX ORDER — ONDANSETRON 2 MG/ML
4 INJECTION INTRAMUSCULAR; INTRAVENOUS ONCE
Status: COMPLETED | OUTPATIENT
Start: 2018-12-06 | End: 2018-12-06

## 2018-12-06 RX ORDER — 0.9 % SODIUM CHLORIDE 0.9 %
1000 INTRAVENOUS SOLUTION INTRAVENOUS ONCE
Status: COMPLETED | OUTPATIENT
Start: 2018-12-06 | End: 2018-12-06

## 2018-12-06 RX ORDER — MORPHINE SULFATE/0.9% NACL/PF 1 MG/ML
4 SYRINGE (ML) INJECTION ONCE
Status: COMPLETED | OUTPATIENT
Start: 2018-12-06 | End: 2018-12-06

## 2018-12-06 RX ADMIN — Medication 4 MG: at 22:20

## 2018-12-06 RX ADMIN — SODIUM CHLORIDE 1000 ML: 9 INJECTION, SOLUTION INTRAVENOUS at 22:22

## 2018-12-06 RX ADMIN — ONDANSETRON 4 MG: 2 INJECTION INTRAMUSCULAR; INTRAVENOUS at 22:20

## 2018-12-06 ASSESSMENT — PAIN SCALES - GENERAL
PAINLEVEL_OUTOF10: 4
PAINLEVEL_OUTOF10: 4

## 2018-12-06 ASSESSMENT — PAIN DESCRIPTION - PAIN TYPE: TYPE: ACUTE PAIN

## 2018-12-06 ASSESSMENT — PAIN DESCRIPTION - LOCATION: LOCATION: ABDOMEN

## 2018-12-06 ASSESSMENT — PAIN DESCRIPTION - DESCRIPTORS: DESCRIPTORS: STABBING

## 2018-12-06 ASSESSMENT — PAIN DESCRIPTION - DIRECTION: RADIATING_TOWARDS: BACK

## 2018-12-06 ASSESSMENT — PAIN DESCRIPTION - ORIENTATION: ORIENTATION: MID

## 2018-12-07 PROBLEM — K85.90 PANCREATITIS, RECURRENT: Status: ACTIVE | Noted: 2018-12-07

## 2018-12-07 PROBLEM — K85.90 ACUTE PANCREATITIS: Status: ACTIVE | Noted: 2018-12-07

## 2018-12-07 LAB
GLUCOSE BLD-MCNC: 127 MG/DL (ref 70–99)
GLUCOSE BLD-MCNC: 145 MG/DL (ref 70–99)
GLUCOSE BLD-MCNC: 158 MG/DL (ref 70–99)
GLUCOSE BLD-MCNC: 165 MG/DL (ref 70–99)
PERFORMED ON: ABNORMAL

## 2018-12-07 PROCEDURE — 6360000002 HC RX W HCPCS: Performed by: FAMILY MEDICINE

## 2018-12-07 PROCEDURE — 6370000000 HC RX 637 (ALT 250 FOR IP): Performed by: FAMILY MEDICINE

## 2018-12-07 PROCEDURE — 1210000000 HC MED SURG R&B

## 2018-12-07 PROCEDURE — 82948 REAGENT STRIP/BLOOD GLUCOSE: CPT

## 2018-12-07 PROCEDURE — 94762 N-INVAS EAR/PLS OXIMTRY CONT: CPT

## 2018-12-07 PROCEDURE — 96375 TX/PRO/DX INJ NEW DRUG ADDON: CPT

## 2018-12-07 PROCEDURE — 6370000000 HC RX 637 (ALT 250 FOR IP): Performed by: EMERGENCY MEDICINE

## 2018-12-07 PROCEDURE — 94664 DEMO&/EVAL PT USE INHALER: CPT

## 2018-12-07 PROCEDURE — 2580000003 HC RX 258: Performed by: FAMILY MEDICINE

## 2018-12-07 PROCEDURE — 99285 EMERGENCY DEPT VISIT HI MDM: CPT | Performed by: EMERGENCY MEDICINE

## 2018-12-07 PROCEDURE — 6360000002 HC RX W HCPCS: Performed by: EMERGENCY MEDICINE

## 2018-12-07 PROCEDURE — 2700000000 HC OXYGEN THERAPY PER DAY

## 2018-12-07 PROCEDURE — 94640 AIRWAY INHALATION TREATMENT: CPT

## 2018-12-07 RX ORDER — TIZANIDINE 4 MG/1
4 TABLET ORAL EVERY 8 HOURS PRN
Status: DISCONTINUED | OUTPATIENT
Start: 2018-12-07 | End: 2018-12-10 | Stop reason: HOSPADM

## 2018-12-07 RX ORDER — NYSTATIN 100000 U/G
CREAM TOPICAL 2 TIMES DAILY
Status: DISCONTINUED | OUTPATIENT
Start: 2018-12-07 | End: 2018-12-10 | Stop reason: HOSPADM

## 2018-12-07 RX ORDER — DEXTROSE MONOHYDRATE 25 G/50ML
12.5 INJECTION, SOLUTION INTRAVENOUS PRN
Status: DISCONTINUED | OUTPATIENT
Start: 2018-12-07 | End: 2018-12-10 | Stop reason: HOSPADM

## 2018-12-07 RX ORDER — PANTOPRAZOLE SODIUM 40 MG/1
40 TABLET, DELAYED RELEASE ORAL
Status: DISCONTINUED | OUTPATIENT
Start: 2018-12-07 | End: 2018-12-10 | Stop reason: HOSPADM

## 2018-12-07 RX ORDER — ATORVASTATIN CALCIUM 20 MG/1
20 TABLET, FILM COATED ORAL DAILY
Status: DISCONTINUED | OUTPATIENT
Start: 2018-12-07 | End: 2018-12-10 | Stop reason: HOSPADM

## 2018-12-07 RX ORDER — BETHANECHOL CHLORIDE 25 MG/1
25 TABLET ORAL
Status: DISCONTINUED | OUTPATIENT
Start: 2018-12-07 | End: 2018-12-10 | Stop reason: HOSPADM

## 2018-12-07 RX ORDER — DEXTROSE MONOHYDRATE 50 MG/ML
100 INJECTION, SOLUTION INTRAVENOUS PRN
Status: DISCONTINUED | OUTPATIENT
Start: 2018-12-07 | End: 2018-12-10 | Stop reason: HOSPADM

## 2018-12-07 RX ORDER — BUSPIRONE HYDROCHLORIDE 15 MG/1
15 TABLET ORAL DAILY PRN
Status: DISCONTINUED | OUTPATIENT
Start: 2018-12-07 | End: 2018-12-10 | Stop reason: HOSPADM

## 2018-12-07 RX ORDER — ONDANSETRON 2 MG/ML
4 INJECTION INTRAMUSCULAR; INTRAVENOUS EVERY 8 HOURS PRN
Status: DISCONTINUED | OUTPATIENT
Start: 2018-12-07 | End: 2018-12-10 | Stop reason: HOSPADM

## 2018-12-07 RX ORDER — CETIRIZINE HYDROCHLORIDE 10 MG/1
10 TABLET ORAL DAILY
Status: DISCONTINUED | OUTPATIENT
Start: 2018-12-07 | End: 2018-12-10 | Stop reason: HOSPADM

## 2018-12-07 RX ORDER — PROMETHAZINE HYDROCHLORIDE 25 MG/ML
25 INJECTION, SOLUTION INTRAMUSCULAR; INTRAVENOUS ONCE
Status: COMPLETED | OUTPATIENT
Start: 2018-12-07 | End: 2018-12-07

## 2018-12-07 RX ORDER — ACETAMINOPHEN 325 MG/1
650 TABLET ORAL EVERY 4 HOURS PRN
Status: DISCONTINUED | OUTPATIENT
Start: 2018-12-07 | End: 2018-12-10 | Stop reason: HOSPADM

## 2018-12-07 RX ORDER — GLIMEPIRIDE 2 MG/1
2 TABLET ORAL
Status: DISCONTINUED | OUTPATIENT
Start: 2018-12-07 | End: 2018-12-10 | Stop reason: HOSPADM

## 2018-12-07 RX ORDER — NICOTINE POLACRILEX 4 MG
15 LOZENGE BUCCAL PRN
Status: DISCONTINUED | OUTPATIENT
Start: 2018-12-07 | End: 2018-12-10 | Stop reason: HOSPADM

## 2018-12-07 RX ORDER — SODIUM CHLORIDE 9 MG/ML
INJECTION, SOLUTION INTRAVENOUS CONTINUOUS
Status: DISCONTINUED | OUTPATIENT
Start: 2018-12-07 | End: 2018-12-10 | Stop reason: HOSPADM

## 2018-12-07 RX ORDER — TIZANIDINE 4 MG/1
4 TABLET ORAL EVERY 8 HOURS PRN
COMMUNITY
End: 2022-04-12

## 2018-12-07 RX ORDER — ENALAPRIL MALEATE 5 MG/1
5 TABLET ORAL DAILY
COMMUNITY
End: 2020-02-03 | Stop reason: ALTCHOICE

## 2018-12-07 RX ORDER — OMEPRAZOLE 20 MG/1
40 CAPSULE, DELAYED RELEASE ORAL DAILY PRN
Status: ON HOLD | COMMUNITY
End: 2018-12-10 | Stop reason: HOSPADM

## 2018-12-07 RX ORDER — OXYCODONE AND ACETAMINOPHEN 10; 325 MG/1; MG/1
1 TABLET ORAL EVERY 6 HOURS PRN
Status: DISCONTINUED | OUTPATIENT
Start: 2018-12-07 | End: 2018-12-10 | Stop reason: HOSPADM

## 2018-12-07 RX ORDER — CLONIDINE HYDROCHLORIDE 0.1 MG/1
0.1 TABLET ORAL DAILY PRN
COMMUNITY

## 2018-12-07 RX ORDER — OXYCODONE AND ACETAMINOPHEN 10; 325 MG/1; MG/1
1 TABLET ORAL ONCE
Status: COMPLETED | OUTPATIENT
Start: 2018-12-07 | End: 2018-12-07

## 2018-12-07 RX ORDER — BUSPIRONE HYDROCHLORIDE 15 MG/1
15 TABLET ORAL DAILY PRN
COMMUNITY

## 2018-12-07 RX ORDER — SODIUM CHLORIDE 0.9 % (FLUSH) 0.9 %
10 SYRINGE (ML) INJECTION PRN
Status: DISCONTINUED | OUTPATIENT
Start: 2018-12-07 | End: 2018-12-10 | Stop reason: HOSPADM

## 2018-12-07 RX ORDER — CLONIDINE HYDROCHLORIDE 0.1 MG/1
0.1 TABLET ORAL DAILY PRN
Status: DISCONTINUED | OUTPATIENT
Start: 2018-12-07 | End: 2018-12-10 | Stop reason: HOSPADM

## 2018-12-07 RX ORDER — DULOXETIN HYDROCHLORIDE 30 MG/1
60 CAPSULE, DELAYED RELEASE ORAL DAILY
Status: DISCONTINUED | OUTPATIENT
Start: 2018-12-07 | End: 2018-12-10 | Stop reason: HOSPADM

## 2018-12-07 RX ORDER — SODIUM CHLORIDE 0.9 % (FLUSH) 0.9 %
10 SYRINGE (ML) INJECTION EVERY 12 HOURS SCHEDULED
Status: DISCONTINUED | OUTPATIENT
Start: 2018-12-07 | End: 2018-12-10 | Stop reason: HOSPADM

## 2018-12-07 RX ORDER — IPRATROPIUM BROMIDE AND ALBUTEROL SULFATE 2.5; .5 MG/3ML; MG/3ML
1 SOLUTION RESPIRATORY (INHALATION)
Status: DISCONTINUED | OUTPATIENT
Start: 2018-12-07 | End: 2018-12-10 | Stop reason: HOSPADM

## 2018-12-07 RX ORDER — NIFEDIPINE 30 MG/1
30 TABLET, EXTENDED RELEASE ORAL DAILY
COMMUNITY
End: 2020-02-03 | Stop reason: ALTCHOICE

## 2018-12-07 RX ORDER — DICLOFENAC SODIUM 1 MG/ML
1 SOLUTION/ DROPS OPHTHALMIC 4 TIMES DAILY
COMMUNITY
End: 2020-09-30

## 2018-12-07 RX ADMIN — ONDANSETRON 4 MG: 2 INJECTION INTRAMUSCULAR; INTRAVENOUS at 06:42

## 2018-12-07 RX ADMIN — MOMETASONE FUROATE AND FORMOTEROL FUMARATE DIHYDRATE 2 PUFF: 200; 5 AEROSOL RESPIRATORY (INHALATION) at 19:48

## 2018-12-07 RX ADMIN — Medication 10 ML: at 08:28

## 2018-12-07 RX ADMIN — GLIMEPIRIDE 2 MG: 2 TABLET ORAL at 08:26

## 2018-12-07 RX ADMIN — ENOXAPARIN SODIUM 40 MG: 40 INJECTION SUBCUTANEOUS at 08:26

## 2018-12-07 RX ADMIN — CETIRIZINE HYDROCHLORIDE 10 MG: 10 TABLET, FILM COATED ORAL at 08:25

## 2018-12-07 RX ADMIN — METFORMIN HYDROCHLORIDE 1000 MG: 500 TABLET, FILM COATED ORAL at 19:47

## 2018-12-07 RX ADMIN — CLONIDINE HYDROCHLORIDE 0.1 MG: 0.1 TABLET ORAL at 06:40

## 2018-12-07 RX ADMIN — METOPROLOL TARTRATE 25 MG: 25 TABLET ORAL at 19:47

## 2018-12-07 RX ADMIN — OXYCODONE HYDROCHLORIDE AND ACETAMINOPHEN 1 TABLET: 10; 325 TABLET ORAL at 16:24

## 2018-12-07 RX ADMIN — BETHANECHOL CHLORIDE 25 MG: 25 TABLET ORAL at 08:25

## 2018-12-07 RX ADMIN — Medication 1 MG: at 06:40

## 2018-12-07 RX ADMIN — OXYCODONE HYDROCHLORIDE AND ACETAMINOPHEN 1 TABLET: 10; 325 TABLET ORAL at 01:30

## 2018-12-07 RX ADMIN — IPRATROPIUM BROMIDE AND ALBUTEROL SULFATE 1 AMPULE: .5; 3 SOLUTION RESPIRATORY (INHALATION) at 14:37

## 2018-12-07 RX ADMIN — ATORVASTATIN CALCIUM 20 MG: 20 TABLET, FILM COATED ORAL at 08:26

## 2018-12-07 RX ADMIN — Medication 1 MG: at 00:20

## 2018-12-07 RX ADMIN — PROMETHAZINE HYDROCHLORIDE 25 MG: 25 INJECTION INTRAMUSCULAR; INTRAVENOUS at 01:30

## 2018-12-07 RX ADMIN — OXYCODONE HYDROCHLORIDE AND ACETAMINOPHEN 1 TABLET: 10; 325 TABLET ORAL at 08:53

## 2018-12-07 RX ADMIN — DULOXETINE 60 MG: 30 CAPSULE, DELAYED RELEASE ORAL at 08:26

## 2018-12-07 RX ADMIN — METFORMIN HYDROCHLORIDE 1000 MG: 500 TABLET, FILM COATED ORAL at 08:26

## 2018-12-07 RX ADMIN — IPRATROPIUM BROMIDE AND ALBUTEROL SULFATE 1 AMPULE: .5; 3 SOLUTION RESPIRATORY (INHALATION) at 11:02

## 2018-12-07 RX ADMIN — SODIUM CHLORIDE: 9 INJECTION, SOLUTION INTRAVENOUS at 08:41

## 2018-12-07 RX ADMIN — METOPROLOL TARTRATE 25 MG: 25 TABLET ORAL at 08:25

## 2018-12-07 RX ADMIN — MOMETASONE FUROATE AND FORMOTEROL FUMARATE DIHYDRATE 2 PUFF: 200; 5 AEROSOL RESPIRATORY (INHALATION) at 08:46

## 2018-12-07 RX ADMIN — IPRATROPIUM BROMIDE AND ALBUTEROL SULFATE 1 AMPULE: .5; 3 SOLUTION RESPIRATORY (INHALATION) at 19:53

## 2018-12-07 RX ADMIN — IPRATROPIUM BROMIDE AND ALBUTEROL SULFATE 1 AMPULE: .5; 3 SOLUTION RESPIRATORY (INHALATION) at 07:01

## 2018-12-07 RX ADMIN — SODIUM CHLORIDE: 9 INJECTION, SOLUTION INTRAVENOUS at 17:50

## 2018-12-07 RX ADMIN — PANTOPRAZOLE SODIUM 40 MG: 40 TABLET, DELAYED RELEASE ORAL at 08:25

## 2018-12-07 ASSESSMENT — ENCOUNTER SYMPTOMS
COLOR CHANGE: 0
CONSTIPATION: 0
DIARRHEA: 0
SORE THROAT: 0
SHORTNESS OF BREATH: 0
NAUSEA: 0
VOMITING: 0
ABDOMINAL DISTENTION: 0
TROUBLE SWALLOWING: 0
EYE PAIN: 0
CHEST TIGHTNESS: 0
WHEEZING: 0
PHOTOPHOBIA: 0
RHINORRHEA: 0
ABDOMINAL PAIN: 1
BACK PAIN: 0
COUGH: 0

## 2018-12-07 ASSESSMENT — PAIN SCALES - GENERAL
PAINLEVEL_OUTOF10: 4
PAINLEVEL_OUTOF10: 0
PAINLEVEL_OUTOF10: 5
PAINLEVEL_OUTOF10: 4
PAINLEVEL_OUTOF10: 7

## 2018-12-07 NOTE — TELEPHONE ENCOUNTER
"States she has been being treated for pancreatitis since Tuesday. States she is having increased pain and symptoms. Asking if Dre Maikol goes to both facilities. States she is feeling worse and is going to go to [facility].    Reason for Disposition  • General information question, no triage required and triager able to answer question    Additional Information  • Negative: [1] Caller is not with the adult (patient) AND [2] reporting urgent symptoms  • Negative: Lab result questions  • Negative: Medication questions  • Negative: Caller cannot be reached by phone  • Negative: Caller has already spoken to PCP or another triager  • Negative: RN needs further essential information from caller in order to complete triage  • Negative: Requesting regular office appointment  • Negative: [1] Caller requesting NON-URGENT health information AND [2] PCP's office is the best resource  • Negative: Health Information question, no triage required and triager able to answer question    Answer Assessment - Initial Assessment Questions  1. REASON FOR CALL or QUESTION: \"What is your reason for calling today?\" or \"How can I best help you?\" or \"What question do you have that I can help answer?\"      See note    Protocols used: INFORMATION ONLY CALL-ADULT-      "

## 2018-12-07 NOTE — ED PROVIDER NOTES
Skin: Negative for color change, pallor and rash. Neurological: Negative for dizziness, weakness, light-headedness, numbness and headaches. Psychiatric/Behavioral: Negative for agitation, behavioral problems, confusion, hallucinations and suicidal ideas. PAST MEDICALHISTORY     Past Medical History:   Diagnosis Date    Carpal tunnel syndrome     Chronic back pain     Diabetes ([address]     DVT (deep venous thrombosis) (HCC)     Hyperlipidemia     Hypertension     Osteoarthritis     Pancreatitis     Tendonitis     Unspecified sleep apnea     Pt does not sleep with C-pap machine    Varicose vein 2014         SURGICAL HISTORY       Past Surgical History:   Procedure Laterality Date    APPENDECTOMY      BACK SURGERY      x2    CARPAL TUNNEL RELEASE      right hand     SECTION      X 3    CHOLECYSTECTOMY      COLONOSCOPY  08    COLONOSCOPY  10-    Dr Mallorie Trivedi: tubular adenoma    CYST REMOVAL      GANGLION CYST REMOVAL    ERCP  09    HYSTERECTOMY      TOTAL    JOINT REPLACEMENT Bilateral     Knee    KNEE ARTHROSCOPY      x2    MOUTH SURGERY      teeth pulled    NASAL SEPTUM SURGERY      NECK SURGERY      UPPER GASTROINTESTINAL ENDOSCOPY  3/7/07    UPPER GASTROINTESTINAL ENDOSCOPY  2011    Dr Mallorie Trivedi: gastritis, small hiatal hernia    VARICOSE VEIN SURGERY Right 14 SJS    GSV RF Ablation    VEIN SURGERY  1967    ? left leg vein stripping         CURRENT MEDICATIONS     Previous Medications    ASPIRIN    81 mg by Does not apply route daily     ATORVASTATIN (LIPITOR) 20 MG TABLET    Take 20 mg by mouth daily. BETHANECHOL (URECHOLINE) 25 MG TABLET    Take 1 tablet by mouth 4 times daily (before meals and nightly)    BISOPROLOL (ZEBETA) 5 MG TABLET    Take 5 mg by mouth daily. DULOXETINE HCL (CYMBALTA PO)    Take 60 mg by mouth daily.     FLUTICASONE-SALMETEROL (ADVAIR) 250-50 MCG/DOSE AEPB    Inhale 1 puff into the lungs every 12 hours    GLIMEPIRIDE (AMARYL) 2 MG TABLET        LORATADINE (CLARITIN) 10 MG TABLET    Take 10 mg by mouth daily    MELOXICAM (MOBIC) 15 MG TABLET        METFORMIN (GLUCOPHAGE) 500 MG TABLET    Take 1,000 mg by mouth 2 times daily     NYSTATIN (MYCOSTATIN) 024117 UNIT/GM CREAM    Apply topically 2 times daily Apply topically 2 times daily. OXYCODONE-ACETAMINOPHEN (PERCOCET) 5-325 MG PER TABLET        PANTOPRAZOLE (PROTONIX) 40 MG TABLET    Take 1 tablet by mouth every morning (before breakfast)       ALLERGIES     Ace inhibitors; Augmentin [amoxicillin-pot clavulanate]; Best-c [ascorbate]; Codeine; and Hctz    FAMILY HISTORY       Family History   Problem Relation Age of Onset    Heart Disease Mother     Kidney Disease Mother     Cancer Father         liver cancer    Liver Cancer Father     High Blood Pressure Sister     High Blood Pressure Brother     High Cholesterol Brother     Heart Disease Brother     Colon Cancer Neg Hx     Colon Polyps Neg Hx     Esophageal Cancer Neg Hx     Liver Disease Neg Hx     Rectal Cancer Neg Hx     Stomach Cancer Neg Hx           SOCIAL HISTORY       Social History     Social History    Marital status:      Spouse name: N/A    Number of children: N/A    Years of education: N/A     Social History Main Topics    Smoking status: Current Every Day Smoker     Packs/day: 1.00     Years: 40.00     Types: Cigarettes    Smokeless tobacco: Never Used    Alcohol use No    Drug use: No    Sexual activity: Not on file     Other Topics Concern    Not on file     Social History Narrative    No narrative on file       SCREENINGS             PHYSICAL EXAM    (up to 7 for level 4, 8 or more for level 5)     ED Triage Vitals [12/06/18 2058]   BP Temp Temp src Pulse Resp SpO2 Height Weight   (!) 162/98 -- -- 72 15 99 % 5' 3.5\" (1.613 m) 179 lb (81.2 kg)       Physical Exam   Constitutional: She is oriented to person, place, and time.  She appears well-developed and

## 2018-12-07 NOTE — ED NOTES
Went to room to discharge pt, pt states she is hurting again, MD notified.       Ho Huggins RN  12/07/18 9765

## 2018-12-07 NOTE — H&P
[facility] [employer] OF [facility] [address] [facility]                              HISTORY AND PHYSICAL    PATIENT NAME: Layo FRAIRE               :        1950  MED REC NO:   [id number]                              ROOM:       [facility]  ACCOUNT NO:   [de-identified]                           ADMIT DATE: 2018  PROVIDER:     Yesenia Nieto MD    DATE OF HISTORY AND PHYSICAL:  2018    HISTORY OF PRESENT ILLNESS:  This is a 68-year-old admitted with acute  pancreatitis. It has been going for several days, worsening; was seen  in the office and attempts were made to treat this as an outpatient as  she has a history of this chronically. Apparently though, she got  worse, came to the emergency room, CT showed pancreatitis, blood work  was not significantly abnormal, but she was having severe pain and  emergency room doctor felt she needed to be admitted for pain control  and further evaluation. This has been going for the last several days. PAST MEDICAL HISTORY:  Significant for previous pancreatitis, history of  previous DVT. She has diabetes mellitus; chronic back problems; carpal  tunnel syndrome; hypertension; sleep apnea, but she has refused to use  the CPAP. She has had prior vein surgery in her left leg. She has had  a prior neck surgery, prior nasal septal surgery. She has had knee  arthroscopy. She has had bilateral joint replacements of the knee. She  has had the total hysterectomy. She has had ERCP, prior upper and lower  endoscopy. She has had a cholecystectomy, carpal tunnel surgery,  previous back surgery x2, previous appendectomy. FAMILY HISTORY:  Positive for heart disease in her mother, dad  of  liver cancer, lot of high blood pressure and heart disease in her  siblings. ALLERGIES:  She is allergic to ACE INHIBITORS, AUGMENTIN, CODEINE, HCTZ,  IRBESARTAN and NIFEDIPINE.     MEDICATIONS:  Her medicines on admission

## 2018-12-07 NOTE — PLAN OF CARE
Problem: Nutrition  Goal: Optimal nutrition therapy  Nutrition Problem: Inadequate oral intake  Intervention: Food and/or Nutrient Delivery: Continue current diet  Nutritional Goals: diet progression    Outcome: Ongoing

## 2018-12-08 LAB
ALBUMIN SERPL-MCNC: 3.6 G/DL (ref 3.5–5.2)
ALP BLD-CCNC: 74 U/L (ref 35–104)
ALT SERPL-CCNC: 14 U/L (ref 5–33)
AMYLASE: 81 U/L (ref 28–100)
ANION GAP SERPL CALCULATED.3IONS-SCNC: 12 MMOL/L (ref 7–19)
AST SERPL-CCNC: 9 U/L (ref 5–32)
BASOPHILS ABSOLUTE: 0 K/UL (ref 0–0.2)
BASOPHILS RELATIVE PERCENT: 0.4 % (ref 0–1)
BILIRUB SERPL-MCNC: <0.2 MG/DL (ref 0.2–1.2)
BUN BLDV-MCNC: 12 MG/DL (ref 8–23)
CALCIUM SERPL-MCNC: 9 MG/DL (ref 8.8–10.2)
CHLORIDE BLD-SCNC: 105 MMOL/L (ref 98–111)
CO2: 24 MMOL/L (ref 22–29)
CREAT SERPL-MCNC: 0.9 MG/DL (ref 0.5–0.9)
EOSINOPHILS ABSOLUTE: 0.1 K/UL (ref 0–0.6)
EOSINOPHILS RELATIVE PERCENT: 0.9 % (ref 0–5)
GFR NON-AFRICAN AMERICAN: >60
GLUCOSE BLD-MCNC: 153 MG/DL (ref 70–99)
GLUCOSE BLD-MCNC: 177 MG/DL (ref 70–99)
GLUCOSE BLD-MCNC: 183 MG/DL (ref 74–109)
GLUCOSE BLD-MCNC: 285 MG/DL (ref 70–99)
GLUCOSE BLD-MCNC: 81 MG/DL (ref 70–99)
HCT VFR BLD CALC: 39 % (ref 37–47)
HEMOGLOBIN: 12.3 G/DL (ref 12–16)
LIPASE: 61 U/L (ref 13–60)
LYMPHOCYTES ABSOLUTE: 2.1 K/UL (ref 1.1–4.5)
LYMPHOCYTES RELATIVE PERCENT: 28.4 % (ref 20–40)
MCH RBC QN AUTO: 31.8 PG (ref 27–31)
MCHC RBC AUTO-ENTMCNC: 31.5 G/DL (ref 33–37)
MCV RBC AUTO: 100.8 FL (ref 81–99)
MONOCYTES ABSOLUTE: 0.5 K/UL (ref 0–0.9)
MONOCYTES RELATIVE PERCENT: 6.7 % (ref 0–10)
NEUTROPHILS ABSOLUTE: 4.7 K/UL (ref 1.5–7.5)
NEUTROPHILS RELATIVE PERCENT: 63.2 % (ref 50–65)
PDW BLD-RTO: 13.3 % (ref 11.5–14.5)
PERFORMED ON: ABNORMAL
PERFORMED ON: NORMAL
PLATELET # BLD: 273 K/UL (ref 130–400)
PMV BLD AUTO: 9.1 FL (ref 9.4–12.3)
POTASSIUM SERPL-SCNC: 4.7 MMOL/L (ref 3.5–5)
RBC # BLD: 3.87 M/UL (ref 4.2–5.4)
SODIUM BLD-SCNC: 141 MMOL/L (ref 136–145)
TOTAL PROTEIN: 6.6 G/DL (ref 6.6–8.7)
WBC # BLD: 7.5 K/UL (ref 4.8–10.8)

## 2018-12-08 PROCEDURE — 1210000000 HC MED SURG R&B

## 2018-12-08 PROCEDURE — 36415 COLL VENOUS BLD VENIPUNCTURE: CPT

## 2018-12-08 PROCEDURE — 6360000002 HC RX W HCPCS: Performed by: FAMILY MEDICINE

## 2018-12-08 PROCEDURE — 82948 REAGENT STRIP/BLOOD GLUCOSE: CPT

## 2018-12-08 PROCEDURE — 6370000000 HC RX 637 (ALT 250 FOR IP): Performed by: FAMILY MEDICINE

## 2018-12-08 PROCEDURE — 94762 N-INVAS EAR/PLS OXIMTRY CONT: CPT

## 2018-12-08 PROCEDURE — 80053 COMPREHEN METABOLIC PANEL: CPT

## 2018-12-08 PROCEDURE — 83690 ASSAY OF LIPASE: CPT

## 2018-12-08 PROCEDURE — 2580000003 HC RX 258: Performed by: FAMILY MEDICINE

## 2018-12-08 PROCEDURE — 82150 ASSAY OF AMYLASE: CPT

## 2018-12-08 PROCEDURE — 94640 AIRWAY INHALATION TREATMENT: CPT

## 2018-12-08 PROCEDURE — 85025 COMPLETE CBC W/AUTO DIFF WBC: CPT

## 2018-12-08 PROCEDURE — 2700000000 HC OXYGEN THERAPY PER DAY

## 2018-12-08 RX ADMIN — BETHANECHOL CHLORIDE 25 MG: 25 TABLET ORAL at 20:20

## 2018-12-08 RX ADMIN — IPRATROPIUM BROMIDE AND ALBUTEROL SULFATE 1 AMPULE: .5; 3 SOLUTION RESPIRATORY (INHALATION) at 06:22

## 2018-12-08 RX ADMIN — IPRATROPIUM BROMIDE AND ALBUTEROL SULFATE 1 AMPULE: .5; 3 SOLUTION RESPIRATORY (INHALATION) at 14:30

## 2018-12-08 RX ADMIN — MOMETASONE FUROATE AND FORMOTEROL FUMARATE DIHYDRATE 2 PUFF: 200; 5 AEROSOL RESPIRATORY (INHALATION) at 20:20

## 2018-12-08 RX ADMIN — DULOXETINE 60 MG: 30 CAPSULE, DELAYED RELEASE ORAL at 08:19

## 2018-12-08 RX ADMIN — HYDROMORPHONE HYDROCHLORIDE 0.5 MG: 1 INJECTION, SOLUTION INTRAMUSCULAR; INTRAVENOUS; SUBCUTANEOUS at 04:20

## 2018-12-08 RX ADMIN — ATORVASTATIN CALCIUM 20 MG: 20 TABLET, FILM COATED ORAL at 08:19

## 2018-12-08 RX ADMIN — HYDROMORPHONE HYDROCHLORIDE 0.5 MG: 1 INJECTION, SOLUTION INTRAMUSCULAR; INTRAVENOUS; SUBCUTANEOUS at 08:24

## 2018-12-08 RX ADMIN — PANTOPRAZOLE SODIUM 40 MG: 40 TABLET, DELAYED RELEASE ORAL at 05:33

## 2018-12-08 RX ADMIN — GLIMEPIRIDE 2 MG: 2 TABLET ORAL at 08:19

## 2018-12-08 RX ADMIN — INSULIN LISPRO 6 UNITS: 100 INJECTION, SOLUTION INTRAVENOUS; SUBCUTANEOUS at 12:26

## 2018-12-08 RX ADMIN — ENOXAPARIN SODIUM 40 MG: 40 INJECTION SUBCUTANEOUS at 08:20

## 2018-12-08 RX ADMIN — NYSTATIN: 100000 CREAM TOPICAL at 20:21

## 2018-12-08 RX ADMIN — ONDANSETRON 4 MG: 2 INJECTION INTRAMUSCULAR; INTRAVENOUS at 04:19

## 2018-12-08 RX ADMIN — BETHANECHOL CHLORIDE 25 MG: 25 TABLET ORAL at 17:33

## 2018-12-08 RX ADMIN — METOPROLOL TARTRATE 25 MG: 25 TABLET ORAL at 08:19

## 2018-12-08 RX ADMIN — METFORMIN HYDROCHLORIDE 1000 MG: 500 TABLET, FILM COATED ORAL at 08:20

## 2018-12-08 RX ADMIN — SODIUM CHLORIDE: 9 INJECTION, SOLUTION INTRAVENOUS at 17:32

## 2018-12-08 RX ADMIN — MOMETASONE FUROATE AND FORMOTEROL FUMARATE DIHYDRATE 2 PUFF: 200; 5 AEROSOL RESPIRATORY (INHALATION) at 08:20

## 2018-12-08 RX ADMIN — METFORMIN HYDROCHLORIDE 1000 MG: 500 TABLET, FILM COATED ORAL at 20:20

## 2018-12-08 RX ADMIN — OXYCODONE HYDROCHLORIDE AND ACETAMINOPHEN 1 TABLET: 10; 325 TABLET ORAL at 01:02

## 2018-12-08 RX ADMIN — CETIRIZINE HYDROCHLORIDE 10 MG: 10 TABLET, FILM COATED ORAL at 08:18

## 2018-12-08 RX ADMIN — IPRATROPIUM BROMIDE AND ALBUTEROL SULFATE 1 AMPULE: .5; 3 SOLUTION RESPIRATORY (INHALATION) at 19:19

## 2018-12-08 RX ADMIN — BETHANECHOL CHLORIDE 25 MG: 25 TABLET ORAL at 12:25

## 2018-12-08 RX ADMIN — IPRATROPIUM BROMIDE AND ALBUTEROL SULFATE 1 AMPULE: .5; 3 SOLUTION RESPIRATORY (INHALATION) at 10:01

## 2018-12-08 RX ADMIN — METOPROLOL TARTRATE 25 MG: 25 TABLET ORAL at 20:20

## 2018-12-08 RX ADMIN — CLONIDINE HYDROCHLORIDE 0.1 MG: 0.1 TABLET ORAL at 08:24

## 2018-12-08 ASSESSMENT — PAIN SCALES - GENERAL
PAINLEVEL_OUTOF10: 7
PAINLEVEL_OUTOF10: 0
PAINLEVEL_OUTOF10: 8
PAINLEVEL_OUTOF10: 6

## 2018-12-08 NOTE — PLAN OF CARE
Problem: Falls - Risk of:  Goal: Will remain free from falls  Will remain free from falls   Outcome: Ongoing      Problem: Risk for Impaired Skin Integrity  Goal: Tissue integrity - skin and mucous membranes  Structural intactness and normal physiological function of skin and  mucous membranes.    Outcome: Ongoing      Problem: Pain:  Goal: Pain level will decrease  Pain level will decrease   Outcome: Ongoing    Goal: Control of acute pain  Control of acute pain   Outcome: Ongoing    Goal: Control of chronic pain  Control of chronic pain   Outcome: Ongoing      Problem: Nutrition  Goal: Optimal nutrition therapy  Nutrition Problem: Inadequate oral intake  Intervention: Food and/or Nutrient Delivery: Continue current diet  Nutritional Goals: diet progression     Outcome: Ongoing

## 2018-12-09 LAB
AMYLASE: 56 U/L (ref 28–100)
ANION GAP SERPL CALCULATED.3IONS-SCNC: 10 MMOL/L (ref 7–19)
BASOPHILS ABSOLUTE: 0 K/UL (ref 0–0.2)
BASOPHILS RELATIVE PERCENT: 0.3 % (ref 0–1)
BUN BLDV-MCNC: 9 MG/DL (ref 8–23)
CALCIUM SERPL-MCNC: 8.8 MG/DL (ref 8.8–10.2)
CHLORIDE BLD-SCNC: 105 MMOL/L (ref 98–111)
CO2: 24 MMOL/L (ref 22–29)
CREAT SERPL-MCNC: 0.6 MG/DL (ref 0.5–0.9)
EOSINOPHILS ABSOLUTE: 0.1 K/UL (ref 0–0.6)
EOSINOPHILS RELATIVE PERCENT: 1.3 % (ref 0–5)
GFR NON-AFRICAN AMERICAN: >60
GLUCOSE BLD-MCNC: 105 MG/DL (ref 70–99)
GLUCOSE BLD-MCNC: 130 MG/DL (ref 74–109)
GLUCOSE BLD-MCNC: 147 MG/DL (ref 70–99)
GLUCOSE BLD-MCNC: 193 MG/DL (ref 70–99)
GLUCOSE BLD-MCNC: 250 MG/DL (ref 70–99)
HCT VFR BLD CALC: 35.3 % (ref 37–47)
HEMOGLOBIN: 11.5 G/DL (ref 12–16)
LIPASE: 30 U/L (ref 13–60)
LYMPHOCYTES ABSOLUTE: 2.4 K/UL (ref 1.1–4.5)
LYMPHOCYTES RELATIVE PERCENT: 37.8 % (ref 20–40)
MCH RBC QN AUTO: 32.3 PG (ref 27–31)
MCHC RBC AUTO-ENTMCNC: 32.6 G/DL (ref 33–37)
MCV RBC AUTO: 99.2 FL (ref 81–99)
MONOCYTES ABSOLUTE: 0.5 K/UL (ref 0–0.9)
MONOCYTES RELATIVE PERCENT: 7.7 % (ref 0–10)
NEUTROPHILS ABSOLUTE: 3.3 K/UL (ref 1.5–7.5)
NEUTROPHILS RELATIVE PERCENT: 52.4 % (ref 50–65)
PDW BLD-RTO: 13.2 % (ref 11.5–14.5)
PERFORMED ON: ABNORMAL
PLATELET # BLD: 249 K/UL (ref 130–400)
PMV BLD AUTO: 9.5 FL (ref 9.4–12.3)
POTASSIUM SERPL-SCNC: 4 MMOL/L (ref 3.5–5)
RBC # BLD: 3.56 M/UL (ref 4.2–5.4)
SODIUM BLD-SCNC: 139 MMOL/L (ref 136–145)
WBC # BLD: 6.2 K/UL (ref 4.8–10.8)

## 2018-12-09 PROCEDURE — 80048 BASIC METABOLIC PNL TOTAL CA: CPT

## 2018-12-09 PROCEDURE — 85025 COMPLETE CBC W/AUTO DIFF WBC: CPT

## 2018-12-09 PROCEDURE — 6370000000 HC RX 637 (ALT 250 FOR IP): Performed by: FAMILY MEDICINE

## 2018-12-09 PROCEDURE — 1210000000 HC MED SURG R&B

## 2018-12-09 PROCEDURE — 6360000002 HC RX W HCPCS: Performed by: FAMILY MEDICINE

## 2018-12-09 PROCEDURE — 36415 COLL VENOUS BLD VENIPUNCTURE: CPT

## 2018-12-09 PROCEDURE — 82150 ASSAY OF AMYLASE: CPT

## 2018-12-09 PROCEDURE — 83690 ASSAY OF LIPASE: CPT

## 2018-12-09 PROCEDURE — 82948 REAGENT STRIP/BLOOD GLUCOSE: CPT

## 2018-12-09 PROCEDURE — 94762 N-INVAS EAR/PLS OXIMTRY CONT: CPT

## 2018-12-09 PROCEDURE — 94640 AIRWAY INHALATION TREATMENT: CPT

## 2018-12-09 RX ADMIN — MOMETASONE FUROATE AND FORMOTEROL FUMARATE DIHYDRATE 2 PUFF: 200; 5 AEROSOL RESPIRATORY (INHALATION) at 20:36

## 2018-12-09 RX ADMIN — IPRATROPIUM BROMIDE AND ALBUTEROL SULFATE 1 AMPULE: .5; 3 SOLUTION RESPIRATORY (INHALATION) at 15:33

## 2018-12-09 RX ADMIN — BETHANECHOL CHLORIDE 25 MG: 25 TABLET ORAL at 18:01

## 2018-12-09 RX ADMIN — PANTOPRAZOLE SODIUM 40 MG: 40 TABLET, DELAYED RELEASE ORAL at 06:17

## 2018-12-09 RX ADMIN — INSULIN LISPRO 2 UNITS: 100 INJECTION, SOLUTION INTRAVENOUS; SUBCUTANEOUS at 11:47

## 2018-12-09 RX ADMIN — METFORMIN HYDROCHLORIDE 1000 MG: 500 TABLET, FILM COATED ORAL at 20:37

## 2018-12-09 RX ADMIN — BETHANECHOL CHLORIDE 25 MG: 25 TABLET ORAL at 06:18

## 2018-12-09 RX ADMIN — METOPROLOL TARTRATE 25 MG: 25 TABLET ORAL at 08:34

## 2018-12-09 RX ADMIN — NYSTATIN: 100000 CREAM TOPICAL at 20:41

## 2018-12-09 RX ADMIN — IPRATROPIUM BROMIDE AND ALBUTEROL SULFATE 1 AMPULE: .5; 3 SOLUTION RESPIRATORY (INHALATION) at 10:53

## 2018-12-09 RX ADMIN — DULOXETINE 60 MG: 30 CAPSULE, DELAYED RELEASE ORAL at 08:34

## 2018-12-09 RX ADMIN — BETHANECHOL CHLORIDE 25 MG: 25 TABLET ORAL at 11:46

## 2018-12-09 RX ADMIN — IPRATROPIUM BROMIDE AND ALBUTEROL SULFATE 1 AMPULE: .5; 3 SOLUTION RESPIRATORY (INHALATION) at 07:03

## 2018-12-09 RX ADMIN — CETIRIZINE HYDROCHLORIDE 10 MG: 10 TABLET, FILM COATED ORAL at 08:34

## 2018-12-09 RX ADMIN — METFORMIN HYDROCHLORIDE 1000 MG: 500 TABLET, FILM COATED ORAL at 08:34

## 2018-12-09 RX ADMIN — BETHANECHOL CHLORIDE 25 MG: 25 TABLET ORAL at 20:37

## 2018-12-09 RX ADMIN — OXYCODONE HYDROCHLORIDE AND ACETAMINOPHEN 1 TABLET: 10; 325 TABLET ORAL at 06:18

## 2018-12-09 RX ADMIN — ATORVASTATIN CALCIUM 20 MG: 20 TABLET, FILM COATED ORAL at 08:34

## 2018-12-09 RX ADMIN — METOPROLOL TARTRATE 25 MG: 25 TABLET ORAL at 20:37

## 2018-12-09 RX ADMIN — ENOXAPARIN SODIUM 40 MG: 40 INJECTION SUBCUTANEOUS at 08:34

## 2018-12-09 RX ADMIN — IPRATROPIUM BROMIDE AND ALBUTEROL SULFATE 1 AMPULE: .5; 3 SOLUTION RESPIRATORY (INHALATION) at 20:00

## 2018-12-09 RX ADMIN — CLONIDINE HYDROCHLORIDE 0.1 MG: 0.1 TABLET ORAL at 06:18

## 2018-12-09 RX ADMIN — MOMETASONE FUROATE AND FORMOTEROL FUMARATE DIHYDRATE 2 PUFF: 200; 5 AEROSOL RESPIRATORY (INHALATION) at 10:18

## 2018-12-09 RX ADMIN — GLIMEPIRIDE 2 MG: 2 TABLET ORAL at 08:34

## 2018-12-09 ASSESSMENT — PAIN SCALES - GENERAL
PAINLEVEL_OUTOF10: 0
PAINLEVEL_OUTOF10: 1
PAINLEVEL_OUTOF10: 7

## 2018-12-09 NOTE — PLAN OF CARE
Problem: Falls - Risk of:  Goal: Will remain free from falls  Will remain free from falls   Outcome: Ongoing    Goal: Absence of physical injury  Absence of physical injury   Outcome: Ongoing      Problem: Risk for Impaired Skin Integrity  Goal: Tissue integrity - skin and mucous membranes  Structural intactness and normal physiological function of skin and  mucous membranes.    Outcome: Ongoing      Problem: Pain:  Goal: Pain level will decrease  Pain level will decrease   Outcome: Ongoing    Goal: Control of acute pain  Control of acute pain   Outcome: Ongoing    Goal: Control of chronic pain  Control of chronic pain   Outcome: Ongoing      Problem: Nutrition  Goal: Optimal nutrition therapy  Nutrition Problem: Inadequate oral intake  Intervention: Food and/or Nutrient Delivery: Continue current diet  Nutritional Goals: diet progression     Outcome: Ongoing

## 2018-12-09 NOTE — PROGRESS NOTES
Progress Note  12/9/2018 8:22 AM  Subjective:   Admit Date: 12/6/2018  PCP: Stacy Nieto MD    Interval History: Alittle better    DIET CLEAR LIQUID;     Intake/Output Summary (Last 24 hours) at 12/09/18 0822  Last data filed at 12/09/18 0616   Gross per 24 hour   Intake             3947 ml   Output             3200 ml   Net              747 ml     Medications:      sodium chloride 100 mL/hr at 12/08/18 1732    dextrose        sodium chloride flush  10 mL Intravenous 2 times per day    enoxaparin  40 mg Subcutaneous Daily    insulin lispro  0-12 Units Subcutaneous TID WC    insulin lispro  0-6 Units Subcutaneous Nightly    ipratropium-albuterol  1 ampule Inhalation Q4H WA    atorvastatin  20 mg Oral Daily    bethanechol  25 mg Oral 4x Daily AC & HS    metoprolol tartrate  25 mg Oral BID    DULoxetine  60 mg Oral Daily    mometasone-formoterol  2 puff Inhalation BID    glimepiride  2 mg Oral Daily with breakfast    cetirizine  10 mg Oral Daily    metFORMIN  1,000 mg Oral BID    nystatin   Topical BID    pantoprazole  40 mg Oral QAM AC     Recent Labs      12/06/18 2207 12/08/18   0333  12/09/18   0303   WBC  9.2  7.5  6.2   HGB  13.8  12.3  11.5*   PLT  297  273  249     Recent Labs      12/06/18 2207 12/08/18   0333  12/09/18   0303   NA  129*  141  139   K  5.3*  4.7  4.0   CL  93*  105  105   CO2  23  24  24   BUN  19  12  9   CREATININE  1.0*  0.9  0.6   GLUCOSE  192*  183*  130*     Recent Labs      12/06/18 2207 12/08/18   0333   AST  14  9   ALT  16  14   BILITOT  0.3  <0.2   ALKPHOS  84  74       Objective:   Vitals: BP (!) 181/78   Pulse 83   Temp 97.1 °F (36.2 °C) (Temporal)   Resp 18   Ht 5' 3.5\" (1.613 m)   Wt 172 lb 7 oz (78.2 kg)   SpO2 90%   BMI 30.07 kg/m²   General appearance: alert and cooperative with exam  Lungs: clear to auscultation bilaterally  Heart: regular rate and rhythm, S1, S2 normal, no murmur, click, rub or gallop  Abdomen: +bs, mild mid-epigastric

## 2018-12-10 VITALS
DIASTOLIC BLOOD PRESSURE: 80 MMHG | OXYGEN SATURATION: 94 % | TEMPERATURE: 97.4 F | HEART RATE: 86 BPM | WEIGHT: 172.44 LBS | RESPIRATION RATE: 18 BRPM | BODY MASS INDEX: 29.44 KG/M2 | HEIGHT: 64 IN | SYSTOLIC BLOOD PRESSURE: 157 MMHG

## 2018-12-10 LAB
ALBUMIN SERPL-MCNC: 3.3 G/DL (ref 3.5–5.2)
ALP BLD-CCNC: 64 U/L (ref 35–104)
ALT SERPL-CCNC: 14 U/L (ref 5–33)
AMYLASE: 33 U/L (ref 28–100)
ANION GAP SERPL CALCULATED.3IONS-SCNC: 11 MMOL/L (ref 7–19)
AST SERPL-CCNC: 9 U/L (ref 5–32)
BASOPHILS ABSOLUTE: 0 K/UL (ref 0–0.2)
BASOPHILS RELATIVE PERCENT: 0.5 % (ref 0–1)
BILIRUB SERPL-MCNC: <0.2 MG/DL (ref 0.2–1.2)
BUN BLDV-MCNC: 11 MG/DL (ref 8–23)
CALCIUM SERPL-MCNC: 8.8 MG/DL (ref 8.8–10.2)
CHLORIDE BLD-SCNC: 107 MMOL/L (ref 98–111)
CO2: 24 MMOL/L (ref 22–29)
CREAT SERPL-MCNC: 0.8 MG/DL (ref 0.5–0.9)
EOSINOPHILS ABSOLUTE: 0.1 K/UL (ref 0–0.6)
EOSINOPHILS RELATIVE PERCENT: 1.6 % (ref 0–5)
GFR NON-AFRICAN AMERICAN: >60
GLUCOSE BLD-MCNC: 136 MG/DL (ref 74–109)
GLUCOSE BLD-MCNC: 140 MG/DL (ref 70–99)
HCT VFR BLD CALC: 33.1 % (ref 37–47)
HEMOGLOBIN: 10.8 G/DL (ref 12–16)
LIPASE: 22 U/L (ref 13–60)
LYMPHOCYTES ABSOLUTE: 2.9 K/UL (ref 1.1–4.5)
LYMPHOCYTES RELATIVE PERCENT: 47.7 % (ref 20–40)
MCH RBC QN AUTO: 32.2 PG (ref 27–31)
MCHC RBC AUTO-ENTMCNC: 32.6 G/DL (ref 33–37)
MCV RBC AUTO: 98.8 FL (ref 81–99)
MONOCYTES ABSOLUTE: 0.4 K/UL (ref 0–0.9)
MONOCYTES RELATIVE PERCENT: 6.3 % (ref 0–10)
NEUTROPHILS ABSOLUTE: 2.7 K/UL (ref 1.5–7.5)
NEUTROPHILS RELATIVE PERCENT: 43.4 % (ref 50–65)
PDW BLD-RTO: 13.3 % (ref 11.5–14.5)
PERFORMED ON: ABNORMAL
PLATELET # BLD: 242 K/UL (ref 130–400)
PMV BLD AUTO: 9.4 FL (ref 9.4–12.3)
POTASSIUM SERPL-SCNC: 4.3 MMOL/L (ref 3.5–5)
RBC # BLD: 3.35 M/UL (ref 4.2–5.4)
SODIUM BLD-SCNC: 142 MMOL/L (ref 136–145)
TOTAL PROTEIN: 5.8 G/DL (ref 6.6–8.7)
WBC # BLD: 6.2 K/UL (ref 4.8–10.8)

## 2018-12-10 PROCEDURE — 83690 ASSAY OF LIPASE: CPT

## 2018-12-10 PROCEDURE — 82150 ASSAY OF AMYLASE: CPT

## 2018-12-10 PROCEDURE — 85025 COMPLETE CBC W/AUTO DIFF WBC: CPT

## 2018-12-10 PROCEDURE — 82948 REAGENT STRIP/BLOOD GLUCOSE: CPT

## 2018-12-10 PROCEDURE — 6360000002 HC RX W HCPCS: Performed by: FAMILY MEDICINE

## 2018-12-10 PROCEDURE — 94640 AIRWAY INHALATION TREATMENT: CPT

## 2018-12-10 PROCEDURE — 6370000000 HC RX 637 (ALT 250 FOR IP): Performed by: FAMILY MEDICINE

## 2018-12-10 PROCEDURE — 80053 COMPREHEN METABOLIC PANEL: CPT

## 2018-12-10 PROCEDURE — 36415 COLL VENOUS BLD VENIPUNCTURE: CPT

## 2018-12-10 RX ADMIN — DULOXETINE 60 MG: 30 CAPSULE, DELAYED RELEASE ORAL at 07:40

## 2018-12-10 RX ADMIN — NYSTATIN: 100000 CREAM TOPICAL at 07:41

## 2018-12-10 RX ADMIN — CETIRIZINE HYDROCHLORIDE 10 MG: 10 TABLET, FILM COATED ORAL at 07:40

## 2018-12-10 RX ADMIN — IPRATROPIUM BROMIDE AND ALBUTEROL SULFATE 1 AMPULE: .5; 3 SOLUTION RESPIRATORY (INHALATION) at 10:27

## 2018-12-10 RX ADMIN — PANTOPRAZOLE SODIUM 40 MG: 40 TABLET, DELAYED RELEASE ORAL at 06:26

## 2018-12-10 RX ADMIN — METFORMIN HYDROCHLORIDE 1000 MG: 500 TABLET, FILM COATED ORAL at 07:39

## 2018-12-10 RX ADMIN — IPRATROPIUM BROMIDE AND ALBUTEROL SULFATE 1 AMPULE: .5; 3 SOLUTION RESPIRATORY (INHALATION) at 06:29

## 2018-12-10 RX ADMIN — BETHANECHOL CHLORIDE 25 MG: 25 TABLET ORAL at 06:26

## 2018-12-10 RX ADMIN — ATORVASTATIN CALCIUM 20 MG: 20 TABLET, FILM COATED ORAL at 07:40

## 2018-12-10 RX ADMIN — MOMETASONE FUROATE AND FORMOTEROL FUMARATE DIHYDRATE 2 PUFF: 200; 5 AEROSOL RESPIRATORY (INHALATION) at 07:41

## 2018-12-10 RX ADMIN — METOPROLOL TARTRATE 25 MG: 25 TABLET ORAL at 07:40

## 2018-12-10 RX ADMIN — GLIMEPIRIDE 2 MG: 2 TABLET ORAL at 07:40

## 2018-12-10 RX ADMIN — ENOXAPARIN SODIUM 40 MG: 40 INJECTION SUBCUTANEOUS at 07:45

## 2018-12-10 NOTE — PROGRESS NOTES
Progress Note  12/10/2018 6:31 AM  Subjective:   Admit Date: 12/6/2018  PCP: Tayler Briggs MD    Interval History: Better    DIET LOW FAT;     Intake/Output Summary (Last 24 hours) at 12/10/18 0631  Last data filed at 12/10/18 3394   Gross per 24 hour   Intake             2273 ml   Output             2200 ml   Net               73 ml     Medications:      sodium chloride 50 mL/hr at 12/09/18 1037    dextrose        sodium chloride flush  10 mL Intravenous 2 times per day    enoxaparin  40 mg Subcutaneous Daily    insulin lispro  0-12 Units Subcutaneous TID WC    insulin lispro  0-6 Units Subcutaneous Nightly    ipratropium-albuterol  1 ampule Inhalation Q4H WA    atorvastatin  20 mg Oral Daily    bethanechol  25 mg Oral 4x Daily AC & HS    metoprolol tartrate  25 mg Oral BID    DULoxetine  60 mg Oral Daily    mometasone-formoterol  2 puff Inhalation BID    glimepiride  2 mg Oral Daily with breakfast    cetirizine  10 mg Oral Daily    metFORMIN  1,000 mg Oral BID    nystatin   Topical BID    pantoprazole  40 mg Oral QAM AC     Recent Labs      12/08/18   0333  12/09/18   0303  12/10/18   0246   WBC  7.5  6.2  6.2   HGB  12.3  11.5*  10.8*   PLT  273  249  242     Recent Labs      12/08/18   0333  12/09/18   0303  12/10/18   0246   NA  141  139  142   K  4.7  4.0  4.3   CL  105  105  107   CO2  24  24  24   BUN  12  9  11   CREATININE  0.9  0.6  0.8   GLUCOSE  183*  130*  136*     Recent Labs      12/08/18   0333  12/10/18   0246   AST  9  9   ALT  14  14   BILITOT  <0.2  <0.2   ALKPHOS  74  64       Objective:   Vitals: /73   Pulse 86   Temp 97.1 °F (36.2 °C)   Resp 18   Ht 5' 3.5\" (1.613 m)   Wt 172 lb 7 oz (78.2 kg)   SpO2 94%   BMI 30.07 kg/m²   General appearance: alert and cooperative with exam  Lungs: clear to auscultation bilaterally  Heart: regular rate and rhythm, S1, S2 normal, no murmur, click, rub or gallop  Abdomen: +bs, mild mid-epigastric tenderness  Extremities:

## 2018-12-12 NOTE — PROGRESS NOTES
YOB: 1950  Location: [facility]  Location Address: [address] [place] [address], [facility], [address] [place] [address]  Location Phone: [phone]    Chief Complaint   Patient presents with   • Tongue lesion     white spots under tongue       History of Present Illness  Blanca Martin is a 68 y.o. female.  Blanca Martin is here for evaluation of ENT complaints. The patient has had problems with tongue sores  The symptoms are localized to the left> right  ventral tongue. The patient has had mild to moderate symptoms. The symptoms have been present for the last several months The symptoms are aggravated by  no identifiable factors. The symptoms are improved by no identifiable factors.                 Past Medical History:   Diagnosis Date   • Arthritis    • Chronic back pain    • Chronic kidney disease     stage 2   • Depression    • Diabetes mellitus (CMS/HCC)    • H/O blood clots    • Hyperlipidemia    • Hypertension    • PONV (postoperative nausea and vomiting)    • Spinal headache    • Vascular disease        Past Surgical History:   Procedure Laterality Date   • APPENDECTOMY     • BACK SURGERY     • CARPAL TUNNEL RELEASE     • CERVICAL FUSION      C5-6   •  SECTION     • CHOLECYSTECTOMY     • GANGLION CYST EXCISION Right    • HERNIA REPAIR      VENTRAL    • HYSTERECTOMY     • JOINT REPLACEMENT Bilateral 2012   • KNEE SURGERY     • LEG SURGERY      blood clot removed   • LUMBAR FUSION  2010    L2-5/L5S1   • NASAL SEPTUM SURGERY  2005   • RADIOFREQUENCY ABLATION Right    • SHOULDER SURGERY     • TONSILLECTOMY     • TRIGGER FINGER RELEASE Left    • VEIN LIGATION AND STRIPPING Left        Outpatient Medications Marked as Taking for the 18 encounter (Office Visit) with Peter Navarro MD   Medication Sig Dispense Refill   • aspirin 81 MG tablet      • atorvastatin (LIPITOR) 20 MG tablet Take  by mouth.     • bisoprolol (ZEBeta) 5 MG tablet Take 5 mg by  mouth Daily.     • busPIRone (BUSPAR) 15 MG tablet Take 15 mg by mouth Daily As Needed (AS NEEDED FOR ANXIETY).     • Calcium Carb-Cholecalciferol (CALCIUM PLUS D3 ABSORBABLE) 600-2500 MG-UNIT capsule Take 1 tablet by mouth Daily.     • CloNIDine (CATAPRES) 0.1 MG tablet Take 0.1 mg by mouth Daily As Needed for High Blood Pressure (SYSOVER 160 COREEN. OVER 120).     • diclofenac (VOLTAREN) 75 MG EC tablet      • DULoxetine (CYMBALTA) 60 MG capsule Take 60 mg by mouth Daily.     • glimepiride (AMARYL) 2 MG tablet Take 2 mg by mouth Daily As Needed (IF BS OVER 150).     • metFORMIN (GLUCOPHAGE) 1000 MG tablet Take 1,000 mg by mouth 2 (Two) Times a Day.     • montelukast (SINGULAIR) 10 MG tablet Take 10 mg by mouth Every Night.     • Multiple Vitamins-Minerals (WOMENS DAILY FORMULA) tablet Take 1 tablet by mouth Daily.     • omeprazole (priLOSEC) 40 MG capsule Take 40 mg by mouth As Needed.     • oxyCODONE-acetaminophen (PERCOCET) 5-325 MG per tablet      • tiZANidine (ZANAFLEX) 4 MG tablet Take 4 mg by mouth Every 8 (Eight) Hours As Needed for Muscle Spasms.         Codeine; Amoxicillin-pot clavulanate; Ace inhibitors; Advair diskus [fluticasone-salmeterol]; Ascorbate; Avapro [irbesartan]; Hydrochlorothiazide; Irbesartan-hydrochlorothiazide; Simvastatin; and Thiazide-type diuretics    Family History   Problem Relation Age of Onset   • Cancer Father    • Lung cancer Mother    • Heart disease Mother        Social History     Socioeconomic History   • Marital status:      Spouse name: Not on file   • Number of children: Not on file   • Years of education: Not on file   • Highest education level: Not on file   Social Needs   • Financial resource strain: Not on file   • Food insecurity - worry: Not on file   • Food insecurity - inability: Not on file   • Transportation needs - medical: Not on file   • Transportation needs - non-medical: Not on file   Occupational History   • Not on file   Tobacco Use   • Smoking  status: Current Every Day Smoker     Packs/day: 1.00     Years: 46.00     Pack years: 46.00     Types: Cigarettes   • Smokeless tobacco: Never Used   Substance and Sexual Activity   • Alcohol use: No   • Drug use: Defer   • Sexual activity: Defer   Other Topics Concern   • Not on file   Social History Narrative   • Not on file       Review of Systems   Constitutional: Negative for activity change, appetite change, chills, diaphoresis, fatigue, fever and unexpected weight change.   HENT: Positive for congestion, mouth sores, postnasal drip and sinus pressure. Negative for dental problem, drooling, ear discharge, ear pain, facial swelling, hearing loss, nosebleeds, rhinorrhea, sneezing, sore throat, tinnitus, trouble swallowing and voice change.    Eyes: Negative.    Respiratory: Negative.    Cardiovascular: Negative.    Gastrointestinal: Negative.    Endocrine: Negative.    Skin: Negative.    Allergic/Immunologic: Positive for environmental allergies. Negative for food allergies and immunocompromised state.   Neurological: Negative.    Hematological: Negative.    Psychiatric/Behavioral: Negative.        Vitals:    12/13/18 1406   BP: 120/80   Temp: 97.7 °F (36.5 °C)       Body mass index is 31.83 kg/m².    Objective     Physical Exam  CONSTITUTIONAL: well nourished, alert, oriented, in no acute distress     COMMUNICATION AND VOICE: able to communicate normally, normal voice quality    HEAD: normocephalic, no lesions, atraumatic, no tenderness, no masses     FACE: appearance normal, no lesions, no tenderness, no deformities, facial motion symmetric    SALIVARY GLANDS: parotid glands with no tenderness, no swelling, no masses, submandibular glands with normal size, nontender    EYES: ocular motility normal, eyelids normal, orbits normal, no proptosis, conjunctiva normal , pupils equal, round     EARS:  Hearing: response to conversational voice normal bilaterally   External Ears: auricles without lesions  Otoscopic:  tympanic membrane appearance normal, no lesions, no perforation, normal mobility, no fluid    NOSE:  External Nose: structure normal, no tenderness on palpation, no nasal discharge, no lesions, no evidence of trauma, nostrils patent   Intranasal Exam: nasal mucosa erythema and edema, vestibule within normal limits, inferior turbinate normal, nasal septum midline   Nasopharynx:     ORAL:  Lips: upper and lower lips without lesion   Teeth: dentition within normal limits for age   Gums: gingivae healthy   Oral Mucosa: oral mucosa normal, no mucosal lesions   Floor of Mouth: Warthin’s duct patent, mucosa normal  Tongue: lingual mucosa with leukoplakia 4 mm x 1 mm left side and 3 mm x .5 mm right side of ventral surface of tongue, tongue is soft to bimanual exam, no nodularity felt, normal tongue mobility   Palate: soft and hard palates with normal mucosa and structure  Oropharynx: oropharyngeal mucosa erythema    NECK: cervical spine surgical site well healed    THYROID: no overt thyromegaly, no tenderness, nodules or mass present on palpation, position midline     LYMPH NODES: no lymphadenopathy    CHEST/RESPIRATORY: respiratory effort normal, normal breath sounds     CARDIOVASCULAR: rate and rhythm normal, extremities without cyanosis or edema      NEUROLOGIC/PSYCHIATRIC: oriented to time, place and person, mood normal, affect appropriate, CN II-XII intact grossly    Assessment/Plan   Blanca was seen today for tongue lesion.    Diagnoses and all orders for this visit:    Oral leukoplakia ventral tongue    Other orders  -     fluticasone (FLONASE) 50 MCG/ACT nasal spray; 2 sprays into the nostril(s) as directed by provider Daily.  -     triamcinolone (KENALOG) 0.1 % paste; Apply to lesion under tongue 2 times a day for 14 days.      * Surgery not found *  No orders of the defined types were placed in this encounter.    Return in about 3 months (around 3/13/2019) for Recheck oral lesion.       Patient Instructions    Will start triamcinolone dental paste on lesion and recheck in 3 months. If symptoms worsen call for sooner appointment. Will start Flonase for sinus and ear symptoms.     Be aware of the signs and symptoms of head and neck cancer including neck mass, persistent sore throat, ear pain, hemoptysis, weight loss and hoarseness. If any of these symptoms occur, call for evaluation.     For the best response, use your nasal sprays every day without skipping doses. It may take several weeks before the full effect is acheived.       IF YOU SMOKE OR USE TOBACCO PLEASE READ THE FOLLOWING:    Why is smoking bad for me?  Smoking increases the risk of heart disease, lung disease, vascular disease, stroke, and cancer.     If you smoke, STOP!    If you would like more information on quitting smoking, please visit the [employer] website: [email or web address]   This link will provide additional resources including the QUIT line and the Beat the Pack support groups.     For more information:    Quit Now [place]  1-800-QUIT-NOW  https://[place].Musicnoteslogix.org/en-[place]/    MyPlate from [employer]  The general, healthful diet is based on the 2010 Dietary Guidelines for Americans. The amount of food you need to eat from each food group depends on your age, sex, and level of physical activity and can be individualized by a dietitian. Go to ChooseMyPlate.gov for more information.  What do I need to know about the MyPlate plan?  · Enjoy your food, but eat less.  · Avoid oversized portions.  ? ½ of your plate should include fruits and vegetables.  ? ¼ of your plate should be grains.  ? ¼ of your plate should be protein.  Grains  · Make at least half of your grains whole grains.  · For a 2,000 calorie daily food plan, eat 6 oz every day.  · 1 oz is about 1 slice bread, 1 cup cereal, or ½ cup cooked rice, cereal, or pasta.  Vegetables  · Make half your plate fruits and vegetables.  · For a 2,000 calorie  daily food plan, eat 2½ cups every day.  · 1 cup is about 1 cup raw or cooked vegetables or vegetable juice or 2 cups raw leafy greens.  Fruits  · Make half your plate fruits and vegetables.  · For a 2,000 calorie daily food plan, eat 2 cups every day.  · 1 cup is about 1 cup fruit or 100% fruit juice or ½ cup dried fruit.  Protein  · For a 2,000 calorie daily food plan, eat 5½ oz every day.  · 1 oz is about 1 oz meat, poultry, or fish, ¼ cup cooked beans, 1 egg, 1 Tbsp peanut butter, or ½ oz nuts or seeds.  Dairy  · Switch to fat-free or low-fat (1%) milk.  · For a 2,000 calorie daily food plan, eat 3 cups every day.  · 1 cup is about 1 cup milk or yogurt or soy milk (soy beverage), 1½ oz natural cheese, or 2 oz processed cheese.  Fats, Oils, and Empty Calories  · Only small amounts of oils are recommended.  · Empty calories are calories from solid fats or added sugars.  · Compare sodium in foods like soup, bread, and frozen meals. Choose the foods with lower numbers.  · Drink water instead of sugary drinks.  What foods can I eat?  Grains  Whole grains such as whole wheat, quinoa, millet, and bulgur. Bread, rolls, and pasta made from whole grains. Brown or wild rice. Hot or cold cereals made from whole grains and without added sugar.  Vegetables  All fresh vegetables, especially fresh red, dark green, or orange vegetables. Peas and beans. Low-sodium frozen or canned vegetables prepared without added salt. Low-sodium vegetable juices.  Fruits  All fresh, frozen, and dried fruits. Canned fruit packed in water or fruit juice without added sugar. Fruit juices without added sugar.  Meats and Other Protein Sources  Boiled, baked, or grilled lean meat trimmed of fat. Skinless poultry. Fresh seafood and shellfish. Canned seafood packed in water. Unsalted nuts and unsalted nut butters. Tofu. Dried beans and pea. Eggs.  Dairy  Low-fat or fat-free milk, yogurt, and cheeses.  Sweets and Desserts  Frozen desserts made from  low-fat milk.  Fats and Oils  Olive, peanut, and canola oils and margarine. Salad dressing and mayonnaise made from these oils.  Other  Soups and casseroles made from allowed ingredients and without added fat or salt.  The items listed above may not be a complete list of recommended foods or beverages. Contact your dietitian for more options.  What foods are not recommended?  Grains  Sweetened, low-fiber cereals. Packaged baked goods. Snack crackers and chips. Cheese crackers, butter crackers, and biscuits. Frozen waffles, sweet breads, doughnuts, pastries, packaged baking mixes, pancakes, cakes, and cookies.  Vegetables  Regular canned or frozen vegetables or vegetables prepared with salt. Canned tomatoes. Canned tomato sauce. Fried vegetables. Vegetables in cream sauce or cheese sauce.  Fruits  Fruits packed in syrup or made with added sugar.  Meats and Other Protein Sources  Marbled or fatty meats such as ribs. Poultry with skin. Fried meats, poultry, eggs, or fish. Sausages, hot dogs, and deli meats such as pastrami, bologna, or salami.  Dairy  Whole milk, cream, cheeses made from whole milk, sour cream. Ice cream or yogurt made from whole milk or with added sugar.  Beverages  For adults, no more than one alcoholic drink per day. Regular soft drinks or other sugary beverages. Juice drinks.  Sweets and Desserts  Sugary or fatty desserts, candy, and other sweets.  Fats and Oils  Solid shortening or partially hydrogenated oils. Solid margarine. Margarine that contains trans fats. Butter.  The items listed above may not be a complete list of foods and beverages to avoid. Contact your dietitian for more information.  This information is not intended to replace advice given to you by your health care provider. Make sure you discuss any questions you have with your health care provider.  Document Released: 01/06/2009 Document Revised: 05/25/2017 Document Reviewed: 11/26/2014  Elsevier Interactive Patient Education ©  2018 [employer] Inc.     Calorie Counting for Weight Loss  Calories are units of energy. Your body needs a certain amount of calories from food to keep you going throughout the day. When you eat more calories than your body needs, your body stores the extra calories as fat. When you eat fewer calories than your body needs, your body burns fat to get the energy it needs.  Calorie counting means keeping track of how many calories you eat and drink each day. Calorie counting can be helpful if you need to lose weight. If you make sure to eat fewer calories than your body needs, you should lose weight. Ask your health care provider what a healthy weight is for you.  For calorie counting to work, you will need to eat the right number of calories in a day in order to lose a healthy amount of weight per week. A dietitian can help you determine how many calories you need in a day and will give you suggestions on how to reach your calorie goal.  · A healthy amount of weight to lose per week is usually 1-2 lb (0.5-0.9 kg). This usually means that your daily calorie intake should be reduced by 500-750 calories.  · Eating 1,200 - 1,500 calories per day can help most women lose weight.  · Eating 1,500 - 1,800 calories per day can help most men lose weight.    What do I need to know about calorie counting?  In order to meet your daily calorie goal, you will need to:  · Find out how many calories are in each food you would like to eat. Try to do this before you eat.  · Decide how much of the food you plan to eat.  · Write down what you ate and how many calories it had. Doing this is called keeping a food log.    To successfully lose weight, it is important to balance calorie counting with a healthy lifestyle that includes regular activity. Aim for 150 minutes of moderate exercise (such as walking) or 75 minutes of vigorous exercise (such as running) each week.  Where do I find calorie information?    The number of calories in a food  can be found on a Nutrition Facts label. If a food does not have a Nutrition Facts label, try to look up the calories online or ask your dietitian for help.  Remember that calories are listed per serving. If you choose to have more than one serving of a food, you will have to multiply the calories per serving by the amount of servings you plan to eat. For example, the label on a package of bread might say that a serving size is 1 slice and that there are 90 calories in a serving. If you eat 1 slice, you will have eaten 90 calories. If you eat 2 slices, you will have eaten 180 calories.  How do I keep a food log?  Immediately after each meal, record the following information in your food log:  · What you ate. Don't forget to include toppings, sauces, and other extras on the food.  · How much you ate. This can be measured in cups, ounces, or number of items.  · How many calories each food and drink had.  · The total number of calories in the meal.    Keep your food log near you, such as in a small notebook in your pocket, or use a mobile ulisses or website. Some programs will calculate calories for you and show you how many calories you have left for the day to meet your goal.  What are some calorie counting tips?  · Use your calories on foods and drinks that will fill you up and not leave you hungry:  ? Some examples of foods that fill you up are nuts and nut butters, vegetables, lean proteins, and high-fiber foods like whole grains. High-fiber foods are foods with more than 5 g fiber per serving.  ? Drinks such as sodas, specialty coffee drinks, alcohol, and juices have a lot of calories, yet do not fill you up.  · Eat nutritious foods and avoid empty calories. Empty calories are calories you get from foods or beverages that do not have many vitamins or protein, such as candy, sweets, and soda. It is better to have a nutritious high-calorie food (such as an avocado) than a food with few nutrients (such as a bag of  "chips).  · Know how many calories are in the foods you eat most often. This will help you calculate calorie counts faster.  · Pay attention to calories in drinks. Low-calorie drinks include water and unsweetened drinks.  · Pay attention to nutrition labels for \"low fat\" or \"fat free\" foods. These foods sometimes have the same amount of calories or more calories than the full fat versions. They also often have added sugar, starch, or salt, to make up for flavor that was removed with the fat.  · Find a way of tracking calories that works for you. Get creative. Try different apps or programs if writing down calories does not work for you.  What are some portion control tips?  · Know how many calories are in a serving. This will help you know how many servings of a certain food you can have.  · Use a measuring cup to measure serving sizes. You could also try weighing out portions on a kitchen scale. With time, you will be able to estimate serving sizes for some foods.  · Take some time to put servings of different foods on your favorite plates, bowls, and cups so you know what a serving looks like.  · Try not to eat straight from a bag or box. Doing this can lead to overeating. Put the amount you would like to eat in a cup or on a plate to make sure you are eating the right portion.  · Use smaller plates, glasses, and bowls to prevent overeating.  · Try not to multitask (for example, watch TV or use your computer) while eating. If it is time to eat, sit down at a table and enjoy your food. This will help you to know when you are full. It will also help you to be aware of what you are eating and how much you are eating.  What are tips for following this plan?  Reading food labels  · Check the calorie count compared to the serving size. The serving size may be smaller than what you are used to eating.  · Check the source of the calories. Make sure the food you are eating is high in vitamins and protein and low in " saturated and trans fats.  Shopping  · Read nutrition labels while you shop. This will help you make healthy decisions before you decide to purchase your food.  · Make a grocery list and stick to it.  Cooking  · Try to cook your favorite foods in a healthier way. For example, try baking instead of frying.  · Use low-fat dairy products.  Meal planning  · Use more fruits and vegetables. Half of your plate should be fruits and vegetables.  · Include lean proteins like poultry and fish.  How do I count calories when eating out?  · Ask for smaller portion sizes.  · Consider sharing an entree and sides instead of getting your own entree.  · If you get your own entree, eat only half. Ask for a box at the beginning of your meal and put the rest of your entree in it so you are not tempted to eat it.  · If calories are listed on the menu, choose the lower calorie options.  · Choose dishes that include vegetables, fruits, whole grains, low-fat dairy products, and lean protein.  · Choose items that are boiled, broiled, grilled, or steamed. Stay away from items that are buttered, battered, fried, or served with cream sauce. Items labeled “crispy” are usually fried, unless stated otherwise.  · Choose water, low-fat milk, unsweetened iced tea, or other drinks without added sugar. If you want an alcoholic beverage, choose a lower calorie option such as a glass of wine or light beer.  · Ask for dressings, sauces, and syrups on the side. These are usually high in calories, so you should limit the amount you eat.  · If you want a salad, choose a garden salad and ask for grilled meats. Avoid extra toppings like renteria, cheese, or fried items. Ask for the dressing on the side, or ask for olive oil and vinegar or lemon to use as dressing.  · Estimate how many servings of a food you are given. For example, a serving of cooked rice is ½ cup or about the size of half a baseball. Knowing serving sizes will help you be aware of how much food  you are eating at restaurants. The list below tells you how big or small some common portion sizes are based on everyday objects:  ? 1 oz--4 stacked dice.  ? 3 oz--1 deck of cards.  ? 1 tsp--1 die.  ? 1 Tbsp--½ a ping-pong ball.  ? 2 Tbsp--1 ping-pong ball.  ? ½ cup--½ baseball.  ? 1 cup--1 baseball.  Summary  · Calorie counting means keeping track of how many calories you eat and drink each day. If you eat fewer calories than your body needs, you should lose weight.  · A healthy amount of weight to lose per week is usually 1-2 lb (0.5-0.9 kg). This usually means reducing your daily calorie intake by 500-750 calories.  · The number of calories in a food can be found on a Nutrition Facts label. If a food does not have a Nutrition Facts label, try to look up the calories online or ask your dietitian for help.  · Use your calories on foods and drinks that will fill you up, and not on foods and drinks that will leave you hungry.  · Use smaller plates, glasses, and bowls to prevent overeating.  This information is not intended to replace advice given to you by your health care provider. Make sure you discuss any questions you have with your health care provider.  Document Released: 12/18/2006 Document Revised: 11/17/2017 Document Reviewed: 11/17/2017  [employer] Interactive Patient Education © 2018 [employer]     Exercising to Lose Weight  Exercising can help you to lose weight. In order to lose weight through exercise, you need to do vigorous-intensity exercise. You can tell that you are exercising with vigorous intensity if you are breathing very hard and fast and cannot hold a conversation while exercising.  Moderate-intensity exercise helps to maintain your current weight. You can tell that you are exercising at a moderate level if you have a higher heart rate and faster breathing, but you are still able to hold a conversation.  How often should I exercise?  Choose an activity that you enjoy and set realistic goals.  Your health care provider can help you to make an activity plan that works for you. Exercise regularly as directed by your health care provider. This may include:  · Doing resistance training twice each week, such as:  ? Push-ups.  ? Sit-ups.  ? Lifting weights.  ? Using resistance bands.  · Doing a given intensity of exercise for a given amount of time. Choose from these options:  ? 150 minutes of moderate-intensity exercise every week.  ? 75 minutes of vigorous-intensity exercise every week.  ? A mix of moderate-intensity and vigorous-intensity exercise every week.    Children, pregnant women, people who are out of shape, people who are overweight, and older adults may need to consult a health care provider for individual recommendations. If you have any sort of medical condition, be sure to consult your health care provider before starting a new exercise program.  What are some activities that can help me to lose weight?  · Walking at a rate of at least 4.5 miles an hour.  · Jogging or running at a rate of 5 miles per hour.  · Biking at a rate of at least 10 miles per hour.  · Lap swimming.  · Roller-skating or in-line skating.  · Cross-country skiing.  · Vigorous competitive sports, such as football, basketball, and soccer.  · Jumping rope.  · Aerobic dancing.  How can I be more active in my day-to-day activities?  · Use the stairs instead of the elevator.  · Take a walk during your lunch break.  · If you drive, park your car farther away from work or school.  · If you take public transportation, get off one stop early and walk the rest of the way.  · Make all of your phone calls while standing up and walking around.  · Get up, stretch, and walk around every 30 minutes throughout the day.  What guidelines should I follow while exercising?  · Do not exercise so much that you hurt yourself, feel dizzy, or get very short of breath.  · Consult your health care provider prior to starting a new exercise  program.  · Wear comfortable clothes and shoes with good support.  · Drink plenty of water while you exercise to prevent dehydration or heat stroke. Body water is lost during exercise and must be replaced.  · Work out until you breathe faster and your heart beats faster.  This information is not intended to replace advice given to you by your health care provider. Make sure you discuss any questions you have with your health care provider.  Document Released: 01/20/2012 Document Revised: 05/25/2017 Document Reviewed: 05/21/2015  [employer] Interactive Patient Education © 2018 [employer].

## 2018-12-13 ENCOUNTER — OFFICE VISIT (OUTPATIENT)
Dept: OTOLARYNGOLOGY | Facility: CLINIC | Age: 68
End: 2018-12-13

## 2018-12-13 VITALS
DIASTOLIC BLOOD PRESSURE: 80 MMHG | BODY MASS INDEX: 31.24 KG/M2 | WEIGHT: 183 LBS | HEIGHT: 64 IN | TEMPERATURE: 97.7 F | SYSTOLIC BLOOD PRESSURE: 120 MMHG

## 2018-12-13 DIAGNOSIS — K13.21 ORAL LEUKOPLAKIA: Primary | ICD-10-CM

## 2018-12-13 PROCEDURE — 99203 OFFICE O/P NEW LOW 30 MIN: CPT | Performed by: PHYSICIAN ASSISTANT

## 2018-12-13 RX ORDER — FLUTICASONE PROPIONATE 50 MCG
2 SPRAY, SUSPENSION (ML) NASAL DAILY
Qty: 16 G | Refills: 11 | Status: SHIPPED | OUTPATIENT
Start: 2018-12-13

## 2018-12-13 RX ORDER — OXYCODONE HYDROCHLORIDE AND ACETAMINOPHEN 5; 325 MG/1; MG/1
TABLET ORAL
COMMUNITY
Start: 2018-11-29

## 2018-12-13 RX ORDER — MONTELUKAST SODIUM 10 MG/1
10 TABLET ORAL NIGHTLY
COMMUNITY
End: 2021-07-12

## 2018-12-13 RX ORDER — TRIAMCINOLONE ACETONIDE 0.1 %
PASTE (GRAM) DENTAL
Qty: 5 G | Refills: 0 | Status: SHIPPED | OUTPATIENT
Start: 2018-12-13 | End: 2020-09-17

## 2018-12-13 RX ORDER — DICLOFENAC SODIUM 75 MG/1
75 TABLET, DELAYED RELEASE ORAL 2 TIMES DAILY
COMMUNITY
Start: 2018-10-30

## 2018-12-13 NOTE — PATIENT INSTRUCTIONS
Will start triamcinolone dental paste on lesion and recheck in 3 months. If symptoms worsen call for sooner appointment. Will start Flonase for sinus and ear symptoms.     Be aware of the signs and symptoms of head and neck cancer including neck mass, persistent sore throat, ear pain, hemoptysis, weight loss and hoarseness. If any of these symptoms occur, call for evaluation.     For the best response, use your nasal sprays every day without skipping doses. It may take several weeks before the full effect is acheived.       IF YOU SMOKE OR USE TOBACCO PLEASE READ THE FOLLOWING:    Why is smoking bad for me?  Smoking increases the risk of heart disease, lung disease, vascular disease, stroke, and cancer.     If you smoke, STOP!    If you would like more information on quitting smoking, please visit the [employer] website: [email or web address]   This link will provide additional resources including the QUIT line and the [employer] the [employer] support groups.     For more information:    Quit Now [place]  1-800-QUIT-NOW  https://[place].[employer].org/en-[place]/    MyPlate from [employer]  The general, healthful diet is based on the 2010 Dietary Guidelines for Americans. The amount of food you need to eat from each food group depends on your age, sex, and level of physical activity and can be individualized by a dietitian. Go to ChooseMyPlate.gov for more information.  What do I need to know about the MyPlate plan?  · Enjoy your food, but eat less.  · Avoid oversized portions.  ? ½ of your plate should include fruits and vegetables.  ? ¼ of your plate should be grains.  ? ¼ of your plate should be protein.  Grains  · Make at least half of your grains whole grains.  · For a 2,000 calorie daily food plan, eat 6 oz every day.  · 1 oz is about 1 slice bread, 1 cup cereal, or ½ cup cooked rice, cereal, or pasta.  Vegetables  · Make half your plate fruits and vegetables.  · For a 2,000 calorie  daily food plan, eat 2½ cups every day.  · 1 cup is about 1 cup raw or cooked vegetables or vegetable juice or 2 cups raw leafy greens.  Fruits  · Make half your plate fruits and vegetables.  · For a 2,000 calorie daily food plan, eat 2 cups every day.  · 1 cup is about 1 cup fruit or 100% fruit juice or ½ cup dried fruit.  Protein  · For a 2,000 calorie daily food plan, eat 5½ oz every day.  · 1 oz is about 1 oz meat, poultry, or fish, ¼ cup cooked beans, 1 egg, 1 Tbsp peanut butter, or ½ oz nuts or seeds.  Dairy  · Switch to fat-free or low-fat (1%) milk.  · For a 2,000 calorie daily food plan, eat 3 cups every day.  · 1 cup is about 1 cup milk or yogurt or soy milk (soy beverage), 1½ oz natural cheese, or 2 oz processed cheese.  Fats, Oils, and Empty Calories  · Only small amounts of oils are recommended.  · Empty calories are calories from solid fats or added sugars.  · Compare sodium in foods like soup, bread, and frozen meals. Choose the foods with lower numbers.  · Drink water instead of sugary drinks.  What foods can I eat?  Grains  Whole grains such as whole wheat, quinoa, millet, and bulgur. Bread, rolls, and pasta made from whole grains. Brown or wild rice. Hot or cold cereals made from whole grains and without added sugar.  Vegetables  All fresh vegetables, especially fresh red, dark green, or orange vegetables. Peas and beans. Low-sodium frozen or canned vegetables prepared without added salt. Low-sodium vegetable juices.  Fruits  All fresh, frozen, and dried fruits. Canned fruit packed in water or fruit juice without added sugar. Fruit juices without added sugar.  Meats and Other Protein Sources  Boiled, baked, or grilled lean meat trimmed of fat. Skinless poultry. Fresh seafood and shellfish. Canned seafood packed in water. Unsalted nuts and unsalted nut butters. Tofu. Dried beans and pea. Eggs.  Dairy  Low-fat or fat-free milk, yogurt, and cheeses.  Sweets and Desserts  Frozen desserts made from  low-fat milk.  Fats and Oils  Olive, peanut, and canola oils and margarine. Salad dressing and mayonnaise made from these oils.  Other  Soups and casseroles made from allowed ingredients and without added fat or salt.  The items listed above may not be a complete list of recommended foods or beverages. Contact your dietitian for more options.  What foods are not recommended?  Grains  Sweetened, low-fiber cereals. Packaged baked goods. Snack crackers and chips. Cheese crackers, butter crackers, and biscuits. Frozen waffles, sweet breads, doughnuts, pastries, packaged baking mixes, pancakes, cakes, and cookies.  Vegetables  Regular canned or frozen vegetables or vegetables prepared with salt. Canned tomatoes. Canned tomato sauce. Fried vegetables. Vegetables in cream sauce or cheese sauce.  Fruits  Fruits packed in syrup or made with added sugar.  Meats and Other Protein Sources  Marbled or fatty meats such as ribs. Poultry with skin. Fried meats, poultry, eggs, or fish. Sausages, hot dogs, and deli meats such as pastrami, bologna, or salami.  Dairy  Whole milk, cream, cheeses made from whole milk, sour cream. Ice cream or yogurt made from whole milk or with added sugar.  Beverages  For adults, no more than one alcoholic drink per day. Regular soft drinks or other sugary beverages. Juice drinks.  Sweets and Desserts  Sugary or fatty desserts, candy, and other sweets.  Fats and Oils  Solid shortening or partially hydrogenated oils. Solid margarine. Margarine that contains trans fats. Butter.  The items listed above may not be a complete list of foods and beverages to avoid. Contact your dietitian for more information.  This information is not intended to replace advice given to you by your health care provider. Make sure you discuss any questions you have with your health care provider.  Document Released: 01/06/2009 Document Revised: 05/25/2017 Document Reviewed: 11/26/2014  [employer] Interactive Patient Education ©  2018 [employer] Inc.     Calorie Counting for Weight Loss  Calories are units of energy. Your body needs a certain amount of calories from food to keep you going throughout the day. When you eat more calories than your body needs, your body stores the extra calories as fat. When you eat fewer calories than your body needs, your body burns fat to get the energy it needs.  Calorie counting means keeping track of how many calories you eat and drink each day. Calorie counting can be helpful if you need to lose weight. If you make sure to eat fewer calories than your body needs, you should lose weight. Ask your health care provider what a healthy weight is for you.  For calorie counting to work, you will need to eat the right number of calories in a day in order to lose a healthy amount of weight per week. A dietitian can help you determine how many calories you need in a day and will give you suggestions on how to reach your calorie goal.  · A healthy amount of weight to lose per week is usually 1-2 lb (0.5-0.9 kg). This usually means that your daily calorie intake should be reduced by 500-750 calories.  · Eating 1,200 - 1,500 calories per day can help most women lose weight.  · Eating 1,500 - 1,800 calories per day can help most men lose weight.    What do I need to know about calorie counting?  In order to meet your daily calorie goal, you will need to:  · Find out how many calories are in each food you would like to eat. Try to do this before you eat.  · Decide how much of the food you plan to eat.  · Write down what you ate and how many calories it had. Doing this is called keeping a food log.    To successfully lose weight, it is important to balance calorie counting with a healthy lifestyle that includes regular activity. Aim for 150 minutes of moderate exercise (such as walking) or 75 minutes of vigorous exercise (such as running) each week.  Where do I find calorie information?    The number of calories in a food  can be found on a Nutrition Facts label. If a food does not have a Nutrition Facts label, try to look up the calories online or ask your dietitian for help.  Remember that calories are listed per serving. If you choose to have more than one serving of a food, you will have to multiply the calories per serving by the amount of servings you plan to eat. For example, the label on a package of bread might say that a serving size is 1 slice and that there are 90 calories in a serving. If you eat 1 slice, you will have eaten 90 calories. If you eat 2 slices, you will have eaten 180 calories.  How do I keep a food log?  Immediately after each meal, record the following information in your food log:  · What you ate. Don't forget to include toppings, sauces, and other extras on the food.  · How much you ate. This can be measured in cups, ounces, or number of items.  · How many calories each food and drink had.  · The total number of calories in the meal.    Keep your food log near you, such as in a small notebook in your pocket, or use a mobile ulisses or website. Some programs will calculate calories for you and show you how many calories you have left for the day to meet your goal.  What are some calorie counting tips?  · Use your calories on foods and drinks that will fill you up and not leave you hungry:  ? Some examples of foods that fill you up are nuts and nut butters, vegetables, lean proteins, and high-fiber foods like whole grains. High-fiber foods are foods with more than 5 g fiber per serving.  ? Drinks such as sodas, specialty coffee drinks, alcohol, and juices have a lot of calories, yet do not fill you up.  · Eat nutritious foods and avoid empty calories. Empty calories are calories you get from foods or beverages that do not have many vitamins or protein, such as candy, sweets, and soda. It is better to have a nutritious high-calorie food (such as an avocado) than a food with few nutrients (such as a bag of  "chips).  · Know how many calories are in the foods you eat most often. This will help you calculate calorie counts faster.  · Pay attention to calories in drinks. Low-calorie drinks include water and unsweetened drinks.  · Pay attention to nutrition labels for \"low fat\" or \"fat free\" foods. These foods sometimes have the same amount of calories or more calories than the full fat versions. They also often have added sugar, starch, or salt, to make up for flavor that was removed with the fat.  · Find a way of tracking calories that works for you. Get creative. Try different apps or programs if writing down calories does not work for you.  What are some portion control tips?  · Know how many calories are in a serving. This will help you know how many servings of a certain food you can have.  · Use a measuring cup to measure serving sizes. You could also try weighing out portions on a kitchen scale. With time, you will be able to estimate serving sizes for some foods.  · Take some time to put servings of different foods on your favorite plates, bowls, and cups so you know what a serving looks like.  · Try not to eat straight from a bag or box. Doing this can lead to overeating. Put the amount you would like to eat in a cup or on a plate to make sure you are eating the right portion.  · Use smaller plates, glasses, and bowls to prevent overeating.  · Try not to multitask (for example, watch TV or use your computer) while eating. If it is time to eat, sit down at a table and enjoy your food. This will help you to know when you are full. It will also help you to be aware of what you are eating and how much you are eating.  What are tips for following this plan?  Reading food labels  · Check the calorie count compared to the serving size. The serving size may be smaller than what you are used to eating.  · Check the source of the calories. Make sure the food you are eating is high in vitamins and protein and low in " saturated and trans fats.  Shopping  · Read nutrition labels while you shop. This will help you make healthy decisions before you decide to purchase your food.  · Make a grocery list and stick to it.  Cooking  · Try to cook your favorite foods in a healthier way. For example, try baking instead of frying.  · Use low-fat dairy products.  Meal planning  · Use more fruits and vegetables. Half of your plate should be fruits and vegetables.  · Include lean proteins like poultry and fish.  How do I count calories when eating out?  · Ask for smaller portion sizes.  · Consider sharing an entree and sides instead of getting your own entree.  · If you get your own entree, eat only half. Ask for a box at the beginning of your meal and put the rest of your entree in it so you are not tempted to eat it.  · If calories are listed on the menu, choose the lower calorie options.  · Choose dishes that include vegetables, fruits, whole grains, low-fat dairy products, and lean protein.  · Choose items that are boiled, broiled, grilled, or steamed. Stay away from items that are buttered, battered, fried, or served with cream sauce. Items labeled “crispy” are usually fried, unless stated otherwise.  · Choose water, low-fat milk, unsweetened iced tea, or other drinks without added sugar. If you want an alcoholic beverage, choose a lower calorie option such as a glass of wine or light beer.  · Ask for dressings, sauces, and syrups on the side. These are usually high in calories, so you should limit the amount you eat.  · If you want a salad, choose a garden salad and ask for grilled meats. Avoid extra toppings like renteria, cheese, or fried items. Ask for the dressing on the side, or ask for olive oil and vinegar or lemon to use as dressing.  · Estimate how many servings of a food you are given. For example, a serving of cooked rice is ½ cup or about the size of half a baseball. Knowing serving sizes will help you be aware of how much food  you are eating at restaurants. The list below tells you how big or small some common portion sizes are based on everyday objects:  ? 1 oz--4 stacked dice.  ? 3 oz--1 deck of cards.  ? 1 tsp--1 die.  ? 1 Tbsp--½ a ping-pong ball.  ? 2 Tbsp--1 ping-pong ball.  ? ½ cup--½ baseball.  ? 1 cup--1 baseball.  Summary  · Calorie counting means keeping track of how many calories you eat and drink each day. If you eat fewer calories than your body needs, you should lose weight.  · A healthy amount of weight to lose per week is usually 1-2 lb (0.5-0.9 kg). This usually means reducing your daily calorie intake by 500-750 calories.  · The number of calories in a food can be found on a Nutrition Facts label. If a food does not have a Nutrition Facts label, try to look up the calories online or ask your dietitian for help.  · Use your calories on foods and drinks that will fill you up, and not on foods and drinks that will leave you hungry.  · Use smaller plates, glasses, and bowls to prevent overeating.  This information is not intended to replace advice given to you by your health care provider. Make sure you discuss any questions you have with your health care provider.  Document Released: 12/18/2006 Document Revised: 11/17/2017 Document Reviewed: 11/17/2017  [employer] Interactive Patient Education © 2018 [employer]     Exercising to Lose Weight  Exercising can help you to lose weight. In order to lose weight through exercise, you need to do vigorous-intensity exercise. You can tell that you are exercising with vigorous intensity if you are breathing very hard and fast and cannot hold a conversation while exercising.  Moderate-intensity exercise helps to maintain your current weight. You can tell that you are exercising at a moderate level if you have a higher heart rate and faster breathing, but you are still able to hold a conversation.  How often should I exercise?  Choose an activity that you enjoy and set realistic goals.  Your health care provider can help you to make an activity plan that works for you. Exercise regularly as directed by your health care provider. This may include:  · Doing resistance training twice each week, such as:  ? Push-ups.  ? Sit-ups.  ? Lifting weights.  ? Using resistance bands.  · Doing a given intensity of exercise for a given amount of time. Choose from these options:  ? 150 minutes of moderate-intensity exercise every week.  ? 75 minutes of vigorous-intensity exercise every week.  ? A mix of moderate-intensity and vigorous-intensity exercise every week.    Children, pregnant women, people who are out of shape, people who are overweight, and older adults may need to consult a health care provider for individual recommendations. If you have any sort of medical condition, be sure to consult your health care provider before starting a new exercise program.  What are some activities that can help me to lose weight?  · Walking at a rate of at least 4.5 miles an hour.  · Jogging or running at a rate of 5 miles per hour.  · Biking at a rate of at least 10 miles per hour.  · Lap swimming.  · Roller-skating or in-line skating.  · Cross-country skiing.  · Vigorous competitive sports, such as football, basketball, and soccer.  · Jumping rope.  · Aerobic dancing.  How can I be more active in my day-to-day activities?  · Use the stairs instead of the elevator.  · Take a walk during your lunch break.  · If you drive, park your car farther away from work or school.  · If you take public transportation, get off one stop early and walk the rest of the way.  · Make all of your phone calls while standing up and walking around.  · Get up, stretch, and walk around every 30 minutes throughout the day.  What guidelines should I follow while exercising?  · Do not exercise so much that you hurt yourself, feel dizzy, or get very short of breath.  · Consult your health care provider prior to starting a new exercise  program.  · Wear comfortable clothes and shoes with good support.  · Drink plenty of water while you exercise to prevent dehydration or heat stroke. Body water is lost during exercise and must be replaced.  · Work out until you breathe faster and your heart beats faster.  This information is not intended to replace advice given to you by your health care provider. Make sure you discuss any questions you have with your health care provider.  Document Released: 01/20/2012 Document Revised: 05/25/2017 Document Reviewed: 05/21/2015  [employer] Interactive Patient Education © 2018 [employer]

## 2019-02-27 LAB
BASOPHILS ABSOLUTE: 0 K/UL (ref 0–0.2)
BASOPHILS RELATIVE PERCENT: 0.4 % (ref 0–1)
EOSINOPHILS ABSOLUTE: 0.1 K/UL (ref 0–0.6)
EOSINOPHILS RELATIVE PERCENT: 1.5 % (ref 0–5)
HCT VFR BLD CALC: 45.8 % (ref 37–47)
HEMOGLOBIN: 14.3 G/DL (ref 12–16)
LYMPHOCYTES ABSOLUTE: 3.3 K/UL (ref 1.1–4.5)
LYMPHOCYTES RELATIVE PERCENT: 34.7 % (ref 20–40)
MCH RBC QN AUTO: 31.4 PG (ref 27–31)
MCHC RBC AUTO-ENTMCNC: 31.2 G/DL (ref 33–37)
MCV RBC AUTO: 100.7 FL (ref 81–99)
MONOCYTES ABSOLUTE: 0.7 K/UL (ref 0–0.9)
MONOCYTES RELATIVE PERCENT: 6.9 % (ref 0–10)
NEUTROPHILS ABSOLUTE: 5.4 K/UL (ref 1.5–7.5)
NEUTROPHILS RELATIVE PERCENT: 56 % (ref 50–65)
PDW BLD-RTO: 13.8 % (ref 11.5–14.5)
PLATELET # BLD: 272 K/UL (ref 130–400)
PMV BLD AUTO: 9.1 FL (ref 9.4–12.3)
RBC # BLD: 4.55 M/UL (ref 4.2–5.4)
WBC # BLD: 9.6 K/UL (ref 4.8–10.8)

## 2019-03-13 NOTE — PROGRESS NOTES
YOB: 1950  Location: [facility]  Location Address: [address] [place] [address], [facility], [address] [place] [address]  Location Phone: [phone]    Chief Complaint   Patient presents with   • Follow-up       History of Present Illness  Blanca Martin is a 68 y.o. female.  Blanca Martin is here for follow-up evaluation of ENT complaints. The patient has had problems with tongue sores.  The symptoms are localized to the left> right  ventral tongue. The patient has had improved symptoms. The symptoms have been present for the last several months The symptoms are aggravated by  no identifiable factors. The symptoms are improved by medications.                           Past Medical History:   Diagnosis Date   • Arthritis    • Chronic back pain    • Chronic kidney disease     stage 2   • Depression    • Diabetes mellitus (CMS/HCC)    • H/O blood clots    • Hyperlipidemia    • Hypertension    • PONV (postoperative nausea and vomiting)    • Spinal headache    • Vascular disease        Past Surgical History:   Procedure Laterality Date   • ANTERIOR CERVICAL DISCECTOMY W/ FUSION N/A 2018    Procedure: ANTERIOR CERVICAL DISCECTOMY FUSION C4-5, C6-7 WITH INSTRUMENTATION C4-7;  Surgeon: JAXSON Rm MD;  Location: [facility];  Service: Orthopedic Spine   • APPENDECTOMY     • BACK SURGERY     • CARPAL TUNNEL RELEASE     • CERVICAL FUSION      C5-6   •  SECTION     • CHOLECYSTECTOMY     • GANGLION CYST EXCISION Right    • HERNIA REPAIR      VENTRAL    • HYSTERECTOMY     • JOINT REPLACEMENT Bilateral 2012   • KNEE SURGERY     • LEG SURGERY      blood clot removed   • LUMBAR FUSION  2010    L2-5/L5S1   • NASAL SEPTUM SURGERY     • RADIOFREQUENCY ABLATION Right    • SHOULDER SURGERY     • TONSILLECTOMY     • TRIGGER FINGER RELEASE Left    • VEIN LIGATION AND STRIPPING Left        Outpatient Medications Marked as Taking for the 3/14/19 encounter (Office Visit) with  Peter Navarro MD   Medication Sig Dispense Refill   • aspirin 81 MG tablet      • atorvastatin (LIPITOR) 20 MG tablet Take  by mouth.     • bisoprolol (ZEBeta) 5 MG tablet Take 5 mg by mouth Daily.     • busPIRone (BUSPAR) 15 MG tablet Take 15 mg by mouth Daily As Needed (AS NEEDED FOR ANXIETY).     • Calcium Carb-Cholecalciferol (CALCIUM PLUS D3 ABSORBABLE) 600-2500 MG-UNIT capsule Take 1 tablet by mouth Daily.     • CloNIDine (CATAPRES) 0.1 MG tablet Take 0.1 mg by mouth Daily As Needed for High Blood Pressure (SYSOVER 160 COREEN. OVER 120).     • diclofenac (VOLTAREN) 75 MG EC tablet      • DULoxetine (CYMBALTA) 60 MG capsule Take 60 mg by mouth Daily.     • fluticasone (FLONASE) 50 MCG/ACT nasal spray 2 sprays into the nostril(s) as directed by provider Daily. 16 g 11   • glimepiride (AMARYL) 2 MG tablet Take 2 mg by mouth Daily As Needed (IF BS OVER 150).     • metFORMIN (GLUCOPHAGE) 1000 MG tablet Take 1,000 mg by mouth 2 (Two) Times a Day.     • montelukast (SINGULAIR) 10 MG tablet Take 10 mg by mouth Every Night.     • Multiple Vitamins-Minerals (WOMENS DAILY FORMULA) tablet Take 1 tablet by mouth Daily.     • omeprazole (priLOSEC) 40 MG capsule Take 40 mg by mouth As Needed.     • oxyCODONE-acetaminophen (PERCOCET) 5-325 MG per tablet      • tiZANidine (ZANAFLEX) 4 MG tablet Take 4 mg by mouth Every 8 (Eight) Hours As Needed for Muscle Spasms.     • triamcinolone (KENALOG) 0.1 % paste Apply to lesion under tongue 2 times a day for 14 days. 5 g 0       Codeine; Amoxicillin-pot clavulanate; Ace inhibitors; Advair diskus [fluticasone-salmeterol]; Ascorbate; Avapro [irbesartan]; Hydrochlorothiazide; Irbesartan-hydrochlorothiazide; Simvastatin; and Thiazide-type diuretics    Family History   Problem Relation Age of Onset   • Cancer Father    • Lung cancer Mother    • Heart disease Mother        Social History     Socioeconomic History   • Marital status:      Spouse name: Not on file   • Number of  children: Not on file   • Years of education: Not on file   • Highest education level: Not on file   Social Needs   • Financial resource strain: Not on file   • Food insecurity - worry: Not on file   • Food insecurity - inability: Not on file   • Transportation needs - medical: Not on file   • Transportation needs - non-medical: Not on file   Occupational History   • Not on file   Tobacco Use   • Smoking status: Current Every Day Smoker     Packs/day: 1.00     Years: 46.00     Pack years: 46.00     Types: Cigarettes   • Smokeless tobacco: Never Used   Substance and Sexual Activity   • Alcohol use: No   • Drug use: Defer   • Sexual activity: Defer   Other Topics Concern   • Not on file   Social History Narrative   • Not on file       Review of Systems   Constitutional: Negative for activity change, appetite change, chills, diaphoresis, fatigue, fever and unexpected weight change.   HENT: Positive for mouth sores (tongue). Negative for congestion, dental problem, drooling, ear discharge, ear pain, facial swelling, hearing loss, nosebleeds, postnasal drip, rhinorrhea, sinus pressure, sneezing, sore throat, tinnitus, trouble swallowing and voice change.    Eyes: Negative.    Respiratory: Negative.    Cardiovascular: Negative.    Gastrointestinal: Negative.    Endocrine: Negative.    Skin: Negative.    Allergic/Immunologic: Positive for environmental allergies. Negative for food allergies and immunocompromised state.   Neurological: Negative.    Hematological: Negative.    Psychiatric/Behavioral: Negative.        Vitals:    03/14/19 1209   BP: 140/80   Temp: 97 °F (36.1 °C)       Body mass index is 31.13 kg/m².    Objective     Physical Exam  CONSTITUTIONAL: well nourished, alert, oriented, in no acute distress     COMMUNICATION AND VOICE: able to communicate normally, normal voice quality    HEAD: normocephalic, no lesions, atraumatic, no tenderness, no masses     FACE: appearance normal, no lesions, no tenderness, no  deformities, facial motion symmetric    SALIVARY GLANDS: parotid glands with no tenderness, no swelling, no masses, submandibular glands with normal size, nontender    EYES: ocular motility normal, eyelids normal, orbits normal, no proptosis, conjunctiva normal , pupils equal, round     EARS:  Hearing: response to conversational voice normal bilaterally   External Ears: auricles without lesions  Otoscopic: tympanic membrane appearance normal, no lesions, no perforation, normal mobility, no fluid    NOSE:  External Nose: structure normal, no tenderness on palpation, no nasal discharge, no lesions, no evidence of trauma, nostrils patent   Intranasal Exam: nasal mucosa normal, vestibule within normal limits, inferior turbinate normal, nasal septum midline   Nasopharynx:     ORAL:  Lips: upper and lower lips without lesion   Teeth: dentition within normal limits for age   Gums: gingivae healthy   Oral Mucosa: oral mucosa normal, no mucosal lesions   Floor of Mouth: Warthin’s duct patent, mucosa normal  Tongue: lingual mucosa with leukoplakia 2 mm x 1 mm left side and 2 mm x .5 mm right side of ventral surface of tongue, tongue is soft to bimanual exam, no nodularity felt, normal tongue mobility    Palate: soft and hard palates with normal mucosa and structure  Oropharynx: oropharyngeal mucosa normal    NECK: neck appearance normal, no mass,  noted without erythema or tenderness    THYROID: no overt thyromegaly, no tenderness, nodules or mass present on palpation, position midline     LYMPH NODES: no lymphadenopathy    CHEST/RESPIRATORY: respiratory effort normal, normal breath sounds     CARDIOVASCULAR: rate and rhythm normal, extremities without cyanosis or edema      NEUROLOGIC/PSYCHIATRIC: oriented to time, place and person, mood normal, affect appropriate, CN II-XII intact grossly    Assessment/Plan   Blanca was seen today for follow-up.    Diagnoses and all orders for this visit:    Oral leukoplakia ventral  tongue    Allergic rhinitis, unspecified seasonality, unspecified trigger      * Surgery not found *  No orders of the defined types were placed in this encounter.    Return in about 6 months (around 9/14/2019) for Recheck tongue.       Patient Instructions   Continue treatment as directed, recheck in 6 months. If symptoms worsen call for sooner appointment.      IF YOU SMOKE OR USE TOBACCO PLEASE READ THE FOLLOWING:    Why is smoking bad for me?  Smoking increases the risk of heart disease, lung disease, vascular disease, stroke, and cancer.     If you smoke, STOP!    If you would like more information on quitting smoking, please visit the MyGoodPoints website: [email or web address]   This link will provide additional resources including the QUIT line and the Beat the Pack support groups.     For more information:    Quit Now [facility]  1-800-QUIT-NOW  https://[place].quitlogix.org/en-[place]/    MyPlate from "[employer]"  MyPlate is an outline of a general healthy diet based on the 2010 Dietary Guidelines for Americans, from the U.S. Department of Agriculture (USDA). It sets guidelines for how much food you should eat from each food group based on your age, sex, and level of physical activity.  What are tips for following MyPlate?  To follow MyPlate recommendations:  · Eat a wide variety of fruits and vegetables, grains, and protein foods.  · Serve smaller portions and eat less food throughout the day.  · Limit portion sizes to avoid overeating.  · Enjoy your food.  · Get at least 150 minutes of exercise every week. This is about 30 minutes each day, 5 or more days per week.    It can be difficult to have every meal look like MyPlate. Think about MyPlate as eating guidelines for an entire day, rather than each individual meal.  Fruits and Vegetables  · Make half of your plate fruits and vegetables.  · Eat many different colors of fruits and vegetables each day.  · For a 2,000  calorie daily food plan, eat:  ? 2½ cups of vegetables every day.  ? 2 cups of fruit every day.  · 1 cup is equal to:  ? 1 cup raw or cooked vegetables.  ? 1 cup raw fruit.  ? 1 medium-sized orange, apple, or banana.  ? 1 cup 100% fruit or vegetable juice.  ? 2 cups raw leafy greens, such as lettuce, spinach, or kale.  ? ½ cup dried fruit.  Grains  · One fourth of your plate should be grains.  · Make at least half of the grains you eat each day whole grains.  · For a 2,000 calorie daily food plan, eat 6 oz of grains every day.  · 1 oz is equal to:  ? 1 slice bread.  ? 1 cup cereal.  ? ½ cup cooked rice, cereal, or pasta.  Protein  · One fourth of your plate should be protein.  · Eat a wide variety of protein foods, including meat, poultry, fish, eggs, beans, nuts, and tofu.  · For a 2,000 calorie daily food plan, eat 5½ oz of protein every day.  · 1 oz is equal to:  ? 1 oz meat, poultry, or fish.  ? ¼ cup cooked beans.  ? 1 egg.  ? ½ oz nuts or seeds.  ? 1 Tbsp peanut butter.  Dairy  · Drink fat-free or low-fat (1%) milk.  · Eat or drink dairy as a side to meals.  · For a 2,000 calorie daily food plan, eat or drink 3 cups of dairy every day.  · 1 cup is equal to:  ? 1 cup milk, yogurt, cottage cheese, or soy milk (soy beverage).  ? 2 oz processed cheese.  ? 1½ oz natural cheese.  Fats, oils, salt, and sugars  · Only small amounts of oils are recommended.  · Avoid foods that are high in calories and low in nutritional value (empty calories), like foods high in fat or added sugars.  · Choose foods that are low in salt (sodium). Choose foods that have less than 140 milligrams (mg) of sodium per serving.  · Drink water instead of sugary drinks. Drink enough water each day to keep your urine pale yellow.  Where to find support  · Work with your health care provider or a nutrition specialist (dietitian) to develop a customized eating plan that is right for you.  · Download an ulisses (mobile application) to help you track  your daily food intake.  Where to find more information  · Go to ChooseMyPlate.gov for more information.  · Learn more and log your daily food intake according to MyPlate using USDA's SuperTracker: www.Derbywirecker.usda.gov  Summary  · MyPlate is a general guideline for healthy eating from the USDA. It is based on the 2010 Dietary Guidelines for Americans.  · In general, fruits and vegetables should take up ½ of your plate, grains should take up ¼ of your plate, and protein should take up ¼ of your plate.  This information is not intended to replace advice given to you by your health care provider. Make sure you discuss any questions you have with your health care provider.  Document Released: 01/06/2009 Document Revised: 03/19/2018 Document Reviewed: 03/19/2018  [employer] Interactive Patient Education © 2019 [employer]     Calorie Counting for Weight Loss  Calories are units of energy. Your body needs a certain amount of calories from food to keep you going throughout the day. When you eat more calories than your body needs, your body stores the extra calories as fat. When you eat fewer calories than your body needs, your body burns fat to get the energy it needs.  Calorie counting means keeping track of how many calories you eat and drink each day. Calorie counting can be helpful if you need to lose weight. If you make sure to eat fewer calories than your body needs, you should lose weight. Ask your health care provider what a healthy weight is for you.  For calorie counting to work, you will need to eat the right number of calories in a day in order to lose a healthy amount of weight per week. A dietitian can help you determine how many calories you need in a day and will give you suggestions on how to reach your calorie goal.  · A healthy amount of weight to lose per week is usually 1-2 lb (0.5-0.9 kg). This usually means that your daily calorie intake should be reduced by 500-750 calories.  · Eating 1,200 -  1,500 calories per day can help most women lose weight.  · Eating 1,500 - 1,800 calories per day can help most men lose weight.    What is my plan?  My goal is to have __________ calories per day.  If I have this many calories per day, I should lose around __________ pounds per week.  What do I need to know about calorie counting?  In order to meet your daily calorie goal, you will need to:  · Find out how many calories are in each food you would like to eat. Try to do this before you eat.  · Decide how much of the food you plan to eat.  · Write down what you ate and how many calories it had. Doing this is called keeping a food log.    To successfully lose weight, it is important to balance calorie counting with a healthy lifestyle that includes regular activity. Aim for 150 minutes of moderate exercise (such as walking) or 75 minutes of vigorous exercise (such as running) each week.  Where do I find calorie information?    The number of calories in a food can be found on a Nutrition Facts label. If a food does not have a Nutrition Facts label, try to look up the calories online or ask your dietitian for help.  Remember that calories are listed per serving. If you choose to have more than one serving of a food, you will have to multiply the calories per serving by the amount of servings you plan to eat. For example, the label on a package of bread might say that a serving size is 1 slice and that there are 90 calories in a serving. If you eat 1 slice, you will have eaten 90 calories. If you eat 2 slices, you will have eaten 180 calories.  How do I keep a food log?  Immediately after each meal, record the following information in your food log:  · What you ate. Don't forget to include toppings, sauces, and other extras on the food.  · How much you ate. This can be measured in cups, ounces, or number of items.  · How many calories each food and drink had.  · The total number of calories in the meal.    Keep your food  "log near you, such as in a small notebook in your pocket, or use a mobile ulisses or website. Some programs will calculate calories for you and show you how many calories you have left for the day to meet your goal.  What are some calorie counting tips?  · Use your calories on foods and drinks that will fill you up and not leave you hungry:  ? Some examples of foods that fill you up are nuts and nut butters, vegetables, lean proteins, and high-fiber foods like whole grains. High-fiber foods are foods with more than 5 g fiber per serving.  ? Drinks such as sodas, specialty coffee drinks, alcohol, and juices have a lot of calories, yet do not fill you up.  · Eat nutritious foods and avoid empty calories. Empty calories are calories you get from foods or beverages that do not have many vitamins or protein, such as candy, sweets, and soda. It is better to have a nutritious high-calorie food (such as an avocado) than a food with few nutrients (such as a bag of chips).  · Know how many calories are in the foods you eat most often. This will help you calculate calorie counts faster.  · Pay attention to calories in drinks. Low-calorie drinks include water and unsweetened drinks.  · Pay attention to nutrition labels for \"low fat\" or \"fat free\" foods. These foods sometimes have the same amount of calories or more calories than the full fat versions. They also often have added sugar, starch, or salt, to make up for flavor that was removed with the fat.  · Find a way of tracking calories that works for you. Get creative. Try different apps or programs if writing down calories does not work for you.  What are some portion control tips?  · Know how many calories are in a serving. This will help you know how many servings of a certain food you can have.  · Use a measuring cup to measure serving sizes. You could also try weighing out portions on a kitchen scale. With time, you will be able to estimate serving sizes for some " foods.  · Take some time to put servings of different foods on your favorite plates, bowls, and cups so you know what a serving looks like.  · Try not to eat straight from a bag or box. Doing this can lead to overeating. Put the amount you would like to eat in a cup or on a plate to make sure you are eating the right portion.  · Use smaller plates, glasses, and bowls to prevent overeating.  · Try not to multitask (for example, watch TV or use your computer) while eating. If it is time to eat, sit down at a table and enjoy your food. This will help you to know when you are full. It will also help you to be aware of what you are eating and how much you are eating.  What are tips for following this plan?  Reading food labels  · Check the calorie count compared to the serving size. The serving size may be smaller than what you are used to eating.  · Check the source of the calories. Make sure the food you are eating is high in vitamins and protein and low in saturated and trans fats.  Shopping  · Read nutrition labels while you shop. This will help you make healthy decisions before you decide to purchase your food.  · Make a grocery list and stick to it.  Cooking  · Try to cook your favorite foods in a healthier way. For example, try baking instead of frying.  · Use low-fat dairy products.  Meal planning  · Use more fruits and vegetables. Half of your plate should be fruits and vegetables.  · Include lean proteins like poultry and fish.  How do I count calories when eating out?  · Ask for smaller portion sizes.  · Consider sharing an entree and sides instead of getting your own entree.  · If you get your own entree, eat only half. Ask for a box at the beginning of your meal and put the rest of your entree in it so you are not tempted to eat it.  · If calories are listed on the menu, choose the lower calorie options.  · Choose dishes that include vegetables, fruits, whole grains, low-fat dairy products, and lean  protein.  · Choose items that are boiled, broiled, grilled, or steamed. Stay away from items that are buttered, battered, fried, or served with cream sauce. Items labeled “crispy” are usually fried, unless stated otherwise.  · Choose water, low-fat milk, unsweetened iced tea, or other drinks without added sugar. If you want an alcoholic beverage, choose a lower calorie option such as a glass of wine or light beer.  · Ask for dressings, sauces, and syrups on the side. These are usually high in calories, so you should limit the amount you eat.  · If you want a salad, choose a garden salad and ask for grilled meats. Avoid extra toppings like renteria, cheese, or fried items. Ask for the dressing on the side, or ask for olive oil and vinegar or lemon to use as dressing.  · Estimate how many servings of a food you are given. For example, a serving of cooked rice is ½ cup or about the size of half a baseball. Knowing serving sizes will help you be aware of how much food you are eating at restaurants. The list below tells you how big or small some common portion sizes are based on everyday objects:  ? 1 oz--4 stacked dice.  ? 3 oz--1 deck of cards.  ? 1 tsp--1 die.  ? 1 Tbsp--½ a ping-pong ball.  ? 2 Tbsp--1 ping-pong ball.  ? ½ cup--½ baseball.  ? 1 cup--1 baseball.  Summary  · Calorie counting means keeping track of how many calories you eat and drink each day. If you eat fewer calories than your body needs, you should lose weight.  · A healthy amount of weight to lose per week is usually 1-2 lb (0.5-0.9 kg). This usually means reducing your daily calorie intake by 500-750 calories.  · The number of calories in a food can be found on a Nutrition Facts label. If a food does not have a Nutrition Facts label, try to look up the calories online or ask your dietitian for help.  · Use your calories on foods and drinks that will fill you up, and not on foods and drinks that will leave you hungry.  · Use smaller plates, glasses,  and bowls to prevent overeating.  This information is not intended to replace advice given to you by your health care provider. Make sure you discuss any questions you have with your health care provider.  Document Released: 12/18/2006 Document Revised: 11/17/2017 Document Reviewed: 11/17/2017  [employer] Interactive Patient Education © 2019 [employer]     Exercising to Lose Weight  Exercising can help you to lose weight. In order to lose weight through exercise, you need to do vigorous-intensity exercise. You can tell that you are exercising with vigorous intensity if you are breathing very hard and fast and cannot hold a conversation while exercising.  Moderate-intensity exercise helps to maintain your current weight. You can tell that you are exercising at a moderate level if you have a higher heart rate and faster breathing, but you are still able to hold a conversation.  How often should I exercise?  Choose an activity that you enjoy and set realistic goals. Your health care provider can help you to make an activity plan that works for you. Exercise regularly as directed by your health care provider. This may include:  · Doing resistance training twice each week, such as:  ? Push-ups.  ? Sit-ups.  ? Lifting weights.  ? Using resistance bands.  · Doing a given intensity of exercise for a given amount of time. Choose from these options:  ? 150 minutes of moderate-intensity exercise every week.  ? 75 minutes of vigorous-intensity exercise every week.  ? A mix of moderate-intensity and vigorous-intensity exercise every week.    Children, pregnant women, people who are out of shape, people who are overweight, and older adults may need to consult a health care provider for individual recommendations. If you have any sort of medical condition, be sure to consult your health care provider before starting a new exercise program.  What are some activities that can help me to lose weight?  · Walking at a rate of at least  4.5 miles an hour.  · Jogging or running at a rate of 5 miles per hour.  · Biking at a rate of at least 10 miles per hour.  · Lap swimming.  · Roller-skating or in-line skating.  · Cross-country skiing.  · Vigorous competitive sports, such as football, basketball, and soccer.  · Jumping rope.  · Aerobic dancing.  How can I be more active in my day-to-day activities?  · Use the stairs instead of the elevator.  · Take a walk during your lunch break.  · If you drive, park your car farther away from work or school.  · If you take public transportation, get off one stop early and walk the rest of the way.  · Make all of your phone calls while standing up and walking around.  · Get up, stretch, and walk around every 30 minutes throughout the day.  What guidelines should I follow while exercising?  · Do not exercise so much that you hurt yourself, feel dizzy, or get very short of breath.  · Consult your health care provider prior to starting a new exercise program.  · Wear comfortable clothes and shoes with good support.  · Drink plenty of water while you exercise to prevent dehydration or heat stroke. Body water is lost during exercise and must be replaced.  · Work out until you breathe faster and your heart beats faster.  This information is not intended to replace advice given to you by your health care provider. Make sure you discuss any questions you have with your health care provider.  Document Released: 01/20/2012 Document Revised: 05/25/2017 Document Reviewed: 05/21/2015  [employer] Interactive Patient Education © 2019 [employer]

## 2019-03-14 ENCOUNTER — OFFICE VISIT (OUTPATIENT)
Dept: OTOLARYNGOLOGY | Facility: CLINIC | Age: 69
End: 2019-03-14

## 2019-03-14 VITALS
SYSTOLIC BLOOD PRESSURE: 140 MMHG | HEIGHT: 64 IN | TEMPERATURE: 97 F | DIASTOLIC BLOOD PRESSURE: 80 MMHG | WEIGHT: 179 LBS | BODY MASS INDEX: 30.56 KG/M2

## 2019-03-14 DIAGNOSIS — K13.21 ORAL LEUKOPLAKIA: Primary | ICD-10-CM

## 2019-03-14 DIAGNOSIS — J30.9 ALLERGIC RHINITIS, UNSPECIFIED SEASONALITY, UNSPECIFIED TRIGGER: ICD-10-CM

## 2019-03-14 PROCEDURE — 99213 OFFICE O/P EST LOW 20 MIN: CPT | Performed by: PHYSICIAN ASSISTANT

## 2019-03-14 NOTE — PATIENT INSTRUCTIONS
Continue treatment as directed, recheck in 6 months. If symptoms worsen call for sooner appointment.      IF YOU SMOKE OR USE TOBACCO PLEASE READ THE FOLLOWING:    Why is smoking bad for me?  Smoking increases the risk of heart disease, lung disease, vascular disease, stroke, and cancer.     If you smoke, STOP!    If you would like more information on quitting smoking, please visit the [employer] website: [email or web address]/[employer] [email or web address]/healthier-[email or web address]   This link will provide additional resources including the QUIT line and the Beat the Pack support groups.     For more information:    Quit Now [place]  1-800-QUIT-NOW  https://[facility].ShareGrovelogThinkature.org/en-[place]/    MyPlate from USDA  MyPlate is an outline of a general healthy diet based on the 2010 Dietary Guidelines for Americans, from the U.S. Department of Agriculture (USDA). It sets guidelines for how much food you should eat from each food group based on your age, sex, and level of physical activity.  What are tips for following MyPlate?  To follow MyPlate recommendations:  · Eat a wide variety of fruits and vegetables, grains, and protein foods.  · Serve smaller portions and eat less food throughout the day.  · Limit portion sizes to avoid overeating.  · Enjoy your food.  · Get at least 150 minutes of exercise every week. This is about 30 minutes each day, 5 or more days per week.    It can be difficult to have every meal look like MyPlate. Think about MyPlate as eating guidelines for an entire day, rather than each individual meal.  Fruits and Vegetables  · Make half of your plate fruits and vegetables.  · Eat many different colors of fruits and vegetables each day.  · For a 2,000 calorie daily food plan, eat:  ? 2½ cups of vegetables every day.  ? 2 cups of fruit every day.  · 1 cup is equal to:  ? 1 cup raw or cooked vegetables.  ? 1 cup raw fruit.  ? 1 medium-sized orange, apple, or banana.  ? 1 cup 100% fruit or vegetable juice.  ? 2 cups  raw leafy greens, such as lettuce, spinach, or kale.  ? ½ cup dried fruit.  Grains  · One fourth of your plate should be grains.  · Make at least half of the grains you eat each day whole grains.  · For a 2,000 calorie daily food plan, eat 6 oz of grains every day.  · 1 oz is equal to:  ? 1 slice bread.  ? 1 cup cereal.  ? ½ cup cooked rice, cereal, or pasta.  Protein  · One fourth of your plate should be protein.  · Eat a wide variety of protein foods, including meat, poultry, fish, eggs, beans, nuts, and tofu.  · For a 2,000 calorie daily food plan, eat 5½ oz of protein every day.  · 1 oz is equal to:  ? 1 oz meat, poultry, or fish.  ? ¼ cup cooked beans.  ? 1 egg.  ? ½ oz nuts or seeds.  ? 1 Tbsp peanut butter.  Dairy  · Drink fat-free or low-fat (1%) milk.  · Eat or drink dairy as a side to meals.  · For a 2,000 calorie daily food plan, eat or drink 3 cups of dairy every day.  · 1 cup is equal to:  ? 1 cup milk, yogurt, cottage cheese, or soy milk (soy beverage).  ? 2 oz processed cheese.  ? 1½ oz natural cheese.  Fats, oils, salt, and sugars  · Only small amounts of oils are recommended.  · Avoid foods that are high in calories and low in nutritional value (empty calories), like foods high in fat or added sugars.  · Choose foods that are low in salt (sodium). Choose foods that have less than 140 milligrams (mg) of sodium per serving.  · Drink water instead of sugary drinks. Drink enough water each day to keep your urine pale yellow.  Where to find support  · Work with your health care provider or a nutrition specialist (dietitian) to develop a customized eating plan that is right for you.  · Download an ulisses (mobile application) to help you track your daily food intake.  Where to find more information  · Go to ChooseMyPlate.gov for more information.  · Learn more and log your daily food intake according to MyPlate using USDA's SuperTracker: www.supertracker.usda.gov  Summary  · MyPlate is a general guideline for  healthy eating from the USDA. It is based on the 2010 Dietary Guidelines for Americans.  · In general, fruits and vegetables should take up ½ of your plate, grains should take up ¼ of your plate, and protein should take up ¼ of your plate.  This information is not intended to replace advice given to you by your health care provider. Make sure you discuss any questions you have with your health care provider.  Document Released: 01/06/2009 Document Revised: 03/19/2018 Document Reviewed: 03/19/2018  [employer] Interactive Patient Education © 2019 [employer]     Calorie Counting for Weight Loss  Calories are units of energy. Your body needs a certain amount of calories from food to keep you going throughout the day. When you eat more calories than your body needs, your body stores the extra calories as fat. When you eat fewer calories than your body needs, your body burns fat to get the energy it needs.  Calorie counting means keeping track of how many calories you eat and drink each day. Calorie counting can be helpful if you need to lose weight. If you make sure to eat fewer calories than your body needs, you should lose weight. Ask your health care provider what a healthy weight is for you.  For calorie counting to work, you will need to eat the right number of calories in a day in order to lose a healthy amount of weight per week. A dietitian can help you determine how many calories you need in a day and will give you suggestions on how to reach your calorie goal.  · A healthy amount of weight to lose per week is usually 1-2 lb (0.5-0.9 kg). This usually means that your daily calorie intake should be reduced by 500-750 calories.  · Eating 1,200 - 1,500 calories per day can help most women lose weight.  · Eating 1,500 - 1,800 calories per day can help most men lose weight.    What is my plan?  My goal is to have __________ calories per day.  If I have this many calories per day, I should lose around __________  pounds per week.  What do I need to know about calorie counting?  In order to meet your daily calorie goal, you will need to:  · Find out how many calories are in each food you would like to eat. Try to do this before you eat.  · Decide how much of the food you plan to eat.  · Write down what you ate and how many calories it had. Doing this is called keeping a food log.    To successfully lose weight, it is important to balance calorie counting with a healthy lifestyle that includes regular activity. Aim for 150 minutes of moderate exercise (such as walking) or 75 minutes of vigorous exercise (such as running) each week.  Where do I find calorie information?    The number of calories in a food can be found on a Nutrition Facts label. If a food does not have a Nutrition Facts label, try to look up the calories online or ask your dietitian for help.  Remember that calories are listed per serving. If you choose to have more than one serving of a food, you will have to multiply the calories per serving by the amount of servings you plan to eat. For example, the label on a package of bread might say that a serving size is 1 slice and that there are 90 calories in a serving. If you eat 1 slice, you will have eaten 90 calories. If you eat 2 slices, you will have eaten 180 calories.  How do I keep a food log?  Immediately after each meal, record the following information in your food log:  · What you ate. Don't forget to include toppings, sauces, and other extras on the food.  · How much you ate. This can be measured in cups, ounces, or number of items.  · How many calories each food and drink had.  · The total number of calories in the meal.    Keep your food log near you, such as in a small notebook in your pocket, or use a mobile ulisses or website. Some programs will calculate calories for you and show you how many calories you have left for the day to meet your goal.  What are some calorie counting tips?  · Use your  "calories on foods and drinks that will fill you up and not leave you hungry:  ? Some examples of foods that fill you up are nuts and nut butters, vegetables, lean proteins, and high-fiber foods like whole grains. High-fiber foods are foods with more than 5 g fiber per serving.  ? Drinks such as sodas, specialty coffee drinks, alcohol, and juices have a lot of calories, yet do not fill you up.  · Eat nutritious foods and avoid empty calories. Empty calories are calories you get from foods or beverages that do not have many vitamins or protein, such as candy, sweets, and soda. It is better to have a nutritious high-calorie food (such as an avocado) than a food with few nutrients (such as a bag of chips).  · Know how many calories are in the foods you eat most often. This will help you calculate calorie counts faster.  · Pay attention to calories in drinks. Low-calorie drinks include water and unsweetened drinks.  · Pay attention to nutrition labels for \"low fat\" or \"fat free\" foods. These foods sometimes have the same amount of calories or more calories than the full fat versions. They also often have added sugar, starch, or salt, to make up for flavor that was removed with the fat.  · Find a way of tracking calories that works for you. Get creative. Try different apps or programs if writing down calories does not work for you.  What are some portion control tips?  · Know how many calories are in a serving. This will help you know how many servings of a certain food you can have.  · Use a measuring cup to measure serving sizes. You could also try weighing out portions on a kitchen scale. With time, you will be able to estimate serving sizes for some foods.  · Take some time to put servings of different foods on your favorite plates, bowls, and cups so you know what a serving looks like.  · Try not to eat straight from a bag or box. Doing this can lead to overeating. Put the amount you would like to eat in a cup or on " a plate to make sure you are eating the right portion.  · Use smaller plates, glasses, and bowls to prevent overeating.  · Try not to multitask (for example, watch TV or use your computer) while eating. If it is time to eat, sit down at a table and enjoy your food. This will help you to know when you are full. It will also help you to be aware of what you are eating and how much you are eating.  What are tips for following this plan?  Reading food labels  · Check the calorie count compared to the serving size. The serving size may be smaller than what you are used to eating.  · Check the source of the calories. Make sure the food you are eating is high in vitamins and protein and low in saturated and trans fats.  Shopping  · Read nutrition labels while you shop. This will help you make healthy decisions before you decide to purchase your food.  · Make a grocery list and stick to it.  Cooking  · Try to cook your favorite foods in a healthier way. For example, try baking instead of frying.  · Use low-fat dairy products.  Meal planning  · Use more fruits and vegetables. Half of your plate should be fruits and vegetables.  · Include lean proteins like poultry and fish.  How do I count calories when eating out?  · Ask for smaller portion sizes.  · Consider sharing an entree and sides instead of getting your own entree.  · If you get your own entree, eat only half. Ask for a box at the beginning of your meal and put the rest of your entree in it so you are not tempted to eat it.  · If calories are listed on the menu, choose the lower calorie options.  · Choose dishes that include vegetables, fruits, whole grains, low-fat dairy products, and lean protein.  · Choose items that are boiled, broiled, grilled, or steamed. Stay away from items that are buttered, battered, fried, or served with cream sauce. Items labeled “crispy” are usually fried, unless stated otherwise.  · Choose water, low-fat milk, unsweetened iced tea, or  other drinks without added sugar. If you want an alcoholic beverage, choose a lower calorie option such as a glass of wine or light beer.  · Ask for dressings, sauces, and syrups on the side. These are usually high in calories, so you should limit the amount you eat.  · If you want a salad, choose a garden salad and ask for grilled meats. Avoid extra toppings like renteria, cheese, or fried items. Ask for the dressing on the side, or ask for olive oil and vinegar or lemon to use as dressing.  · Estimate how many servings of a food you are given. For example, a serving of cooked rice is ½ cup or about the size of half a baseball. Knowing serving sizes will help you be aware of how much food you are eating at restaurants. The list below tells you how big or small some common portion sizes are based on everyday objects:  ? 1 oz--4 stacked dice.  ? 3 oz--1 deck of cards.  ? 1 tsp--1 die.  ? 1 Tbsp--½ a ping-pong ball.  ? 2 Tbsp--1 ping-pong ball.  ? ½ cup--½ baseball.  ? 1 cup--1 baseball.  Summary  · Calorie counting means keeping track of how many calories you eat and drink each day. If you eat fewer calories than your body needs, you should lose weight.  · A healthy amount of weight to lose per week is usually 1-2 lb (0.5-0.9 kg). This usually means reducing your daily calorie intake by 500-750 calories.  · The number of calories in a food can be found on a Nutrition Facts label. If a food does not have a Nutrition Facts label, try to look up the calories online or ask your dietitian for help.  · Use your calories on foods and drinks that will fill you up, and not on foods and drinks that will leave you hungry.  · Use smaller plates, glasses, and bowls to prevent overeating.  This information is not intended to replace advice given to you by your health care provider. Make sure you discuss any questions you have with your health care provider.  Document Released: 12/18/2006 Document Revised: 11/17/2017 Document  Reviewed: 11/17/2017  [employer] Interactive Patient Education © 2019 [employer]     Exercising to Lose Weight  Exercising can help you to lose weight. In order to lose weight through exercise, you need to do vigorous-intensity exercise. You can tell that you are exercising with vigorous intensity if you are breathing very hard and fast and cannot hold a conversation while exercising.  Moderate-intensity exercise helps to maintain your current weight. You can tell that you are exercising at a moderate level if you have a higher heart rate and faster breathing, but you are still able to hold a conversation.  How often should I exercise?  Choose an activity that you enjoy and set realistic goals. Your health care provider can help you to make an activity plan that works for you. Exercise regularly as directed by your health care provider. This may include:  · Doing resistance training twice each week, such as:  ? Push-ups.  ? Sit-ups.  ? Lifting weights.  ? Using resistance bands.  · Doing a given intensity of exercise for a given amount of time. Choose from these options:  ? 150 minutes of moderate-intensity exercise every week.  ? 75 minutes of vigorous-intensity exercise every week.  ? A mix of moderate-intensity and vigorous-intensity exercise every week.    Children, pregnant women, people who are out of shape, people who are overweight, and older adults may need to consult a health care provider for individual recommendations. If you have any sort of medical condition, be sure to consult your health care provider before starting a new exercise program.  What are some activities that can help me to lose weight?  · Walking at a rate of at least 4.5 miles an hour.  · Jogging or running at a rate of 5 miles per hour.  · Biking at a rate of at least 10 miles per hour.  · Lap swimming.  · Roller-skating or in-line skating.  · Cross-country skiing.  · Vigorous competitive sports, such as football, basketball, and  soccer.  · Jumping rope.  · Aerobic dancing.  How can I be more active in my day-to-day activities?  · Use the stairs instead of the elevator.  · Take a walk during your lunch break.  · If you drive, park your car farther away from work or school.  · If you take public transportation, get off one stop early and walk the rest of the way.  · Make all of your phone calls while standing up and walking around.  · Get up, stretch, and walk around every 30 minutes throughout the day.  What guidelines should I follow while exercising?  · Do not exercise so much that you hurt yourself, feel dizzy, or get very short of breath.  · Consult your health care provider prior to starting a new exercise program.  · Wear comfortable clothes and shoes with good support.  · Drink plenty of water while you exercise to prevent dehydration or heat stroke. Body water is lost during exercise and must be replaced.  · Work out until you breathe faster and your heart beats faster.  This information is not intended to replace advice given to you by your health care provider. Make sure you discuss any questions you have with your health care provider.  Document Released: 01/20/2012 Document Revised: 05/25/2017 Document Reviewed: 05/21/2015  [employer] Interactive Patient Education © 2019 [employer]

## 2019-05-24 ENCOUNTER — HOSPITAL ENCOUNTER (OUTPATIENT)
Dept: GENERAL RADIOLOGY | Age: 69
Discharge: HOME | End: 2019-05-24
Payer: MEDICARE
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
## 2019-05-24 DIAGNOSIS — M54.5 LOW BACK PAIN, UNSPECIFIED BACK PAIN LATERALITY, UNSPECIFIED CHRONICITY, WITH SCIATICA PRESENCE UNSPECIFIED: ICD-10-CM

## 2019-05-24 DIAGNOSIS — M54.6 PAIN IN THORACIC SPINE: ICD-10-CM

## 2019-05-24 PROCEDURE — 72072 X-RAY EXAM THORAC SPINE 3VWS: CPT

## 2019-05-24 PROCEDURE — 72100 X-RAY EXAM L-S SPINE 2/3 VWS: CPT

## 2019-08-26 ENCOUNTER — TRANSCRIBE ORDERS (OUTPATIENT)
Dept: ADMINISTRATIVE | Facility: HOSPITAL | Age: 69
End: 2019-08-26

## 2019-08-26 DIAGNOSIS — I10 BENIGN HYPERTENSION: Primary | ICD-10-CM

## 2019-08-26 DIAGNOSIS — R07.9 CHEST PAIN, UNSPECIFIED TYPE: ICD-10-CM

## 2019-08-27 LAB
ALBUMIN SERPL-MCNC: 4.3 G/DL (ref 3.5–5.2)
ALP BLD-CCNC: 58 U/L (ref 35–104)
ALT SERPL-CCNC: 31 U/L (ref 5–33)
ANION GAP SERPL CALCULATED.3IONS-SCNC: 14 MMOL/L (ref 7–19)
AST SERPL-CCNC: 17 U/L (ref 5–32)
BILIRUB SERPL-MCNC: 0.3 MG/DL (ref 0.2–1.2)
BUN BLDV-MCNC: 22 MG/DL (ref 8–23)
CALCIUM SERPL-MCNC: 9.4 MG/DL (ref 8.8–10.2)
CHLORIDE BLD-SCNC: 102 MMOL/L (ref 98–111)
CHOLESTEROL, TOTAL: 123 MG/DL (ref 160–199)
CO2: 24 MMOL/L (ref 22–29)
CREAT SERPL-MCNC: 0.9 MG/DL (ref 0.5–0.9)
CREATININE URINE: 114.3 MG/DL (ref 4.2–622)
GFR NON-AFRICAN AMERICAN: >60
GLUCOSE BLD-MCNC: 141 MG/DL (ref 74–109)
HBA1C MFR BLD: 8.4 % (ref 4–6)
HDLC SERPL-MCNC: 38 MG/DL (ref 65–121)
LDL CHOLESTEROL CALCULATED: 69 MG/DL
MICROALBUMIN UR-MCNC: 4.3 MG/DL (ref 0–19)
MICROALBUMIN/CREAT UR-RTO: 37.6 MG/G
POTASSIUM SERPL-SCNC: 4.8 MMOL/L (ref 3.5–5)
SODIUM BLD-SCNC: 140 MMOL/L (ref 136–145)
T4 FREE: 1.1 NG/DL (ref 0.9–1.7)
TOTAL PROTEIN: 7.4 G/DL (ref 6.6–8.7)
TRIGL SERPL-MCNC: 82 MG/DL (ref 0–149)
TSH SERPL DL<=0.05 MIU/L-ACNC: 2.05 UIU/ML (ref 0.27–4.2)
VITAMIN D 25-HYDROXY: 32.3 NG/ML

## 2019-08-29 ENCOUNTER — HOSPITAL ENCOUNTER (OUTPATIENT)
Dept: CARDIOLOGY | Facility: HOSPITAL | Age: 69
Discharge: HOME | End: 2019-08-29

## 2019-08-29 ENCOUNTER — HOSPITAL ENCOUNTER (OUTPATIENT)
Dept: ULTRASOUND IMAGING | Facility: HOSPITAL | Age: 69
Discharge: HOME | End: 2019-08-29
Admitting: NURSE PRACTITIONER

## 2019-08-29 VITALS
SYSTOLIC BLOOD PRESSURE: 132 MMHG | HEIGHT: 64 IN | WEIGHT: 184 LBS | DIASTOLIC BLOOD PRESSURE: 74 MMHG | BODY MASS INDEX: 31.41 KG/M2 | HEART RATE: 70 BPM

## 2019-08-29 DIAGNOSIS — I10 BENIGN HYPERTENSION: ICD-10-CM

## 2019-08-29 DIAGNOSIS — R07.9 CHEST PAIN, UNSPECIFIED TYPE: ICD-10-CM

## 2019-08-29 PROCEDURE — 93350 STRESS TTE ONLY: CPT

## 2019-08-29 PROCEDURE — 25010000003 DOBUTAMINE PER 250 MG: Performed by: INTERNAL MEDICINE

## 2019-08-29 PROCEDURE — 76775 US EXAM ABDO BACK WALL LIM: CPT

## 2019-08-29 PROCEDURE — 93017 CV STRESS TEST TRACING ONLY: CPT

## 2019-08-29 PROCEDURE — 93018 CV STRESS TEST I&R ONLY: CPT | Performed by: INTERNAL MEDICINE

## 2019-08-29 PROCEDURE — 93352 ADMIN ECG CONTRAST AGENT: CPT | Performed by: INTERNAL MEDICINE

## 2019-08-29 PROCEDURE — 93350 STRESS TTE ONLY: CPT | Performed by: INTERNAL MEDICINE

## 2019-08-29 PROCEDURE — 25010000002 PERFLUTREN 6.52 MG/ML SUSPENSION: Performed by: INTERNAL MEDICINE

## 2019-08-29 PROCEDURE — 93975 VASCULAR STUDY: CPT

## 2019-08-29 RX ORDER — DOBUTAMINE HYDROCHLORIDE 100 MG/100ML
10-50 INJECTION INTRAVENOUS CONTINUOUS
Status: DISCONTINUED | OUTPATIENT
Start: 2019-08-29 | End: 2019-08-30 | Stop reason: HOSPADM

## 2019-08-29 RX ADMIN — ATROPINE SULFATE 0.3 MG: 0.1 INJECTION, SOLUTION INTRAVENOUS at 08:46

## 2019-08-29 RX ADMIN — PERFLUTREN 8.48 MG: 6.52 INJECTION, SUSPENSION INTRAVENOUS at 08:27

## 2019-08-29 RX ADMIN — Medication 10 MCG/KG/MIN: at 08:29

## 2019-08-30 LAB
BH CV STRESS BP STAGE 1: NORMAL
BH CV STRESS BP STAGE 2: NORMAL
BH CV STRESS BP STAGE 3: NORMAL
BH CV STRESS DOB - ATROPINE STAGE 3: 0.3
BH CV STRESS DOSE DOBUTAMINE STAGE 1: 10
BH CV STRESS DOSE DOBUTAMINE STAGE 2: 20
BH CV STRESS DOSE DOBUTAMINE STAGE 3: 30
BH CV STRESS DURATION MIN STAGE 1: 3
BH CV STRESS DURATION MIN STAGE 2: 3
BH CV STRESS DURATION MIN STAGE 3: 3
BH CV STRESS DURATION SEC STAGE 1: 0
BH CV STRESS DURATION SEC STAGE 2: 0
BH CV STRESS DURATION SEC STAGE 3: 14
BH CV STRESS HR STAGE 1: 74
BH CV STRESS HR STAGE 2: 88
BH CV STRESS HR STAGE 3: 138
BH CV STRESS PROTOCOL 1: NORMAL
BH CV STRESS RECOVERY BP: NORMAL MMHG
BH CV STRESS RECOVERY HR: 94 BPM
BH CV STRESS STAGE 1: 1
BH CV STRESS STAGE 2: 2
BH CV STRESS STAGE 3: 3
MAXIMAL PREDICTED HEART RATE: 149 BPM
PERCENT MAX PREDICTED HR: 92.62 %
STRESS BASELINE BP: NORMAL MMHG
STRESS BASELINE HR: 66 BPM
STRESS PERCENT HR: 109 %
STRESS POST EXERCISE DUR MIN: 9 MIN
STRESS POST EXERCISE DUR SEC: 14 SEC
STRESS POST PEAK BP: NORMAL MMHG
STRESS POST PEAK HR: 138 BPM
STRESS TARGET HR: 127 BPM

## 2019-09-05 ENCOUNTER — HOSPITAL ENCOUNTER (EMERGENCY)
Facility: HOSPITAL | Age: 69
Discharge: HOME | End: 2019-09-05
Admitting: EMERGENCY MEDICINE

## 2019-09-05 ENCOUNTER — APPOINTMENT (OUTPATIENT)
Dept: GENERAL RADIOLOGY | Facility: HOSPITAL | Age: 69
End: 2019-09-05

## 2019-09-05 ENCOUNTER — APPOINTMENT (OUTPATIENT)
Dept: CT IMAGING | Facility: HOSPITAL | Age: 69
End: 2019-09-05

## 2019-09-05 VITALS
HEIGHT: 64 IN | BODY MASS INDEX: 31.41 KG/M2 | HEART RATE: 68 BPM | SYSTOLIC BLOOD PRESSURE: 125 MMHG | OXYGEN SATURATION: 95 % | WEIGHT: 184 LBS | TEMPERATURE: 98 F | DIASTOLIC BLOOD PRESSURE: 78 MMHG | RESPIRATION RATE: 18 BRPM

## 2019-09-05 DIAGNOSIS — R55 SYNCOPE AND COLLAPSE: Primary | ICD-10-CM

## 2019-09-05 DIAGNOSIS — S16.1XXA STRAIN OF NECK MUSCLE, INITIAL ENCOUNTER: ICD-10-CM

## 2019-09-05 LAB
ALBUMIN SERPL-MCNC: 4 G/DL (ref 3.5–5)
ALBUMIN/GLOB SERPL: 1.5 G/DL (ref 1.1–2.5)
ALP SERPL-CCNC: 51 U/L (ref 24–120)
ALT SERPL W P-5'-P-CCNC: 40 U/L (ref 0–54)
ANION GAP SERPL CALCULATED.3IONS-SCNC: 7 MMOL/L (ref 4–13)
APTT PPP: 25.8 SECONDS (ref 24.1–35)
AST SERPL-CCNC: 41 U/L (ref 7–45)
BACTERIA UR QL AUTO: ABNORMAL /HPF
BASOPHILS # BLD AUTO: 0.05 10*3/MM3 (ref 0–0.2)
BASOPHILS NFR BLD AUTO: 0.5 % (ref 0–1.5)
BILIRUB SERPL-MCNC: 0.2 MG/DL (ref 0.1–1)
BILIRUB UR QL STRIP: NEGATIVE
BUN BLD-MCNC: 25 MG/DL (ref 5–21)
BUN/CREAT SERPL: 20.7 (ref 7–25)
CALCIUM SPEC-SCNC: 8.9 MG/DL (ref 8.4–10.4)
CHLORIDE SERPL-SCNC: 102 MMOL/L (ref 98–110)
CLARITY UR: CLEAR
CO2 SERPL-SCNC: 27 MMOL/L (ref 24–31)
COLOR UR: ABNORMAL
CREAT BLD-MCNC: 1.21 MG/DL (ref 0.5–1.4)
DEPRECATED RDW RBC AUTO: 49.6 FL (ref 37–54)
EOSINOPHIL # BLD AUTO: 0.13 10*3/MM3 (ref 0–0.4)
EOSINOPHIL NFR BLD AUTO: 1.3 % (ref 0.3–6.2)
ERYTHROCYTE [DISTWIDTH] IN BLOOD BY AUTOMATED COUNT: 13.8 % (ref 12.3–15.4)
GFR SERPL CREATININE-BSD FRML MDRD: 44 ML/MIN/1.73
GLOBULIN UR ELPH-MCNC: 2.6 GM/DL
GLUCOSE BLD-MCNC: 144 MG/DL (ref 70–100)
GLUCOSE BLDC GLUCOMTR-MCNC: 126 MG/DL (ref 70–130)
GLUCOSE BLDC GLUCOMTR-MCNC: 130 MG/DL (ref 70–130)
GLUCOSE UR STRIP-MCNC: NEGATIVE MG/DL
HCT VFR BLD AUTO: 38 % (ref 34–46.6)
HGB BLD-MCNC: 12.5 G/DL (ref 12–15.9)
HGB UR QL STRIP.AUTO: NEGATIVE
HOLD SPECIMEN: NORMAL
HOLD SPECIMEN: NORMAL
IMM GRANULOCYTES # BLD AUTO: 0.09 10*3/MM3 (ref 0–0.05)
IMM GRANULOCYTES NFR BLD AUTO: 0.9 % (ref 0–0.5)
INR PPP: 0.9 (ref 0.91–1.09)
KETONES UR QL STRIP: ABNORMAL
LEUKOCYTE ESTERASE UR QL STRIP.AUTO: NEGATIVE
LIPASE SERPL-CCNC: 44 U/L (ref 23–203)
LYMPHOCYTES # BLD AUTO: 2.49 10*3/MM3 (ref 0.7–3.1)
LYMPHOCYTES NFR BLD AUTO: 24.5 % (ref 19.6–45.3)
MAGNESIUM SERPL-MCNC: 1.6 MG/DL (ref 1.4–2.2)
MCH RBC QN AUTO: 32.6 PG (ref 26.6–33)
MCHC RBC AUTO-ENTMCNC: 32.9 G/DL (ref 31.5–35.7)
MCV RBC AUTO: 99.2 FL (ref 79–97)
MONOCYTES # BLD AUTO: 0.5 10*3/MM3 (ref 0.1–0.9)
MONOCYTES NFR BLD AUTO: 4.9 % (ref 5–12)
NEUTROPHILS # BLD AUTO: 6.9 10*3/MM3 (ref 1.7–7)
NEUTROPHILS NFR BLD AUTO: 67.9 % (ref 42.7–76)
NITRITE UR QL STRIP: NEGATIVE
NRBC BLD AUTO-RTO: 0 /100 WBC (ref 0–0.2)
NT-PROBNP SERPL-MCNC: 228 PG/ML (ref 0–900)
PH UR STRIP.AUTO: 5.5 [PH] (ref 5–8)
PLATELET # BLD AUTO: 207 10*3/MM3 (ref 140–450)
PMV BLD AUTO: 9 FL (ref 6–12)
POTASSIUM BLD-SCNC: 5.3 MMOL/L (ref 3.5–5.3)
PROT SERPL-MCNC: 6.6 G/DL (ref 6.3–8.7)
PROT UR QL STRIP: ABNORMAL
PROTHROMBIN TIME: 12.4 SECONDS (ref 11.9–14.6)
RBC # BLD AUTO: 3.83 10*6/MM3 (ref 3.77–5.28)
RBC # UR: ABNORMAL /HPF
REF LAB TEST METHOD: ABNORMAL
SODIUM BLD-SCNC: 136 MMOL/L (ref 135–145)
SP GR UR STRIP: >=1.03 (ref 1–1.03)
SQUAMOUS #/AREA URNS HPF: ABNORMAL /HPF
TROPONIN I SERPL-MCNC: <0.012 NG/ML (ref 0–0.03)
TSH SERPL DL<=0.05 MIU/L-ACNC: 2.15 UIU/ML (ref 0.47–4.68)
UROBILINOGEN UR QL STRIP: ABNORMAL
WBC NRBC COR # BLD: 10.16 10*3/MM3 (ref 3.4–10.8)
WBC UR QL AUTO: ABNORMAL /HPF
WHOLE BLOOD HOLD SPECIMEN: NORMAL
WHOLE BLOOD HOLD SPECIMEN: NORMAL

## 2019-09-05 PROCEDURE — 93005 ELECTROCARDIOGRAM TRACING: CPT

## 2019-09-05 PROCEDURE — 84443 ASSAY THYROID STIM HORMONE: CPT | Performed by: EMERGENCY MEDICINE

## 2019-09-05 PROCEDURE — 96376 TX/PRO/DX INJ SAME DRUG ADON: CPT

## 2019-09-05 PROCEDURE — 83880 ASSAY OF NATRIURETIC PEPTIDE: CPT | Performed by: EMERGENCY MEDICINE

## 2019-09-05 PROCEDURE — 73562 X-RAY EXAM OF KNEE 3: CPT

## 2019-09-05 PROCEDURE — 85025 COMPLETE CBC W/AUTO DIFF WBC: CPT | Performed by: EMERGENCY MEDICINE

## 2019-09-05 PROCEDURE — 84484 ASSAY OF TROPONIN QUANT: CPT | Performed by: EMERGENCY MEDICINE

## 2019-09-05 PROCEDURE — 82962 GLUCOSE BLOOD TEST: CPT

## 2019-09-05 PROCEDURE — 93005 ELECTROCARDIOGRAM TRACING: CPT | Performed by: EMERGENCY MEDICINE

## 2019-09-05 PROCEDURE — 83735 ASSAY OF MAGNESIUM: CPT | Performed by: EMERGENCY MEDICINE

## 2019-09-05 PROCEDURE — 83690 ASSAY OF LIPASE: CPT | Performed by: EMERGENCY MEDICINE

## 2019-09-05 PROCEDURE — 81001 URINALYSIS AUTO W/SCOPE: CPT | Performed by: EMERGENCY MEDICINE

## 2019-09-05 PROCEDURE — 72128 CT CHEST SPINE W/O DYE: CPT

## 2019-09-05 PROCEDURE — 25010000002 MORPHINE PER 10 MG: Performed by: PHYSICIAN ASSISTANT

## 2019-09-05 PROCEDURE — 72125 CT NECK SPINE W/O DYE: CPT

## 2019-09-05 PROCEDURE — 71045 X-RAY EXAM CHEST 1 VIEW: CPT

## 2019-09-05 PROCEDURE — 85730 THROMBOPLASTIN TIME PARTIAL: CPT | Performed by: EMERGENCY MEDICINE

## 2019-09-05 PROCEDURE — 93010 ELECTROCARDIOGRAM REPORT: CPT | Performed by: INTERNAL MEDICINE

## 2019-09-05 PROCEDURE — 99284 EMERGENCY DEPT VISIT MOD MDM: CPT

## 2019-09-05 PROCEDURE — 25010000002 MORPHINE PER 10 MG: Performed by: EMERGENCY MEDICINE

## 2019-09-05 PROCEDURE — 80053 COMPREHEN METABOLIC PANEL: CPT | Performed by: EMERGENCY MEDICINE

## 2019-09-05 PROCEDURE — 25010000002 ONDANSETRON PER 1 MG: Performed by: EMERGENCY MEDICINE

## 2019-09-05 PROCEDURE — 73080 X-RAY EXAM OF ELBOW: CPT

## 2019-09-05 PROCEDURE — 85610 PROTHROMBIN TIME: CPT | Performed by: EMERGENCY MEDICINE

## 2019-09-05 PROCEDURE — 72131 CT LUMBAR SPINE W/O DYE: CPT

## 2019-09-05 PROCEDURE — 96374 THER/PROPH/DIAG INJ IV PUSH: CPT

## 2019-09-05 PROCEDURE — 96375 TX/PRO/DX INJ NEW DRUG ADDON: CPT

## 2019-09-05 PROCEDURE — 70450 CT HEAD/BRAIN W/O DYE: CPT

## 2019-09-05 PROCEDURE — 73030 X-RAY EXAM OF SHOULDER: CPT

## 2019-09-05 RX ORDER — ONDANSETRON 2 MG/ML
4 INJECTION INTRAMUSCULAR; INTRAVENOUS ONCE
Status: COMPLETED | OUTPATIENT
Start: 2019-09-05 | End: 2019-09-05

## 2019-09-05 RX ADMIN — MORPHINE SULFATE 4 MG: 4 INJECTION, SOLUTION INTRAMUSCULAR; INTRAVENOUS at 18:47

## 2019-09-05 RX ADMIN — MORPHINE SULFATE 4 MG: 4 INJECTION, SOLUTION INTRAMUSCULAR; INTRAVENOUS at 17:09

## 2019-09-05 RX ADMIN — ONDANSETRON HYDROCHLORIDE 4 MG: 2 SOLUTION INTRAMUSCULAR; INTRAVENOUS at 17:09

## 2019-09-06 NOTE — ED PROVIDER NOTES
Subjective   History of Present Illness  69-year-old female presents with a chief complaint of syncopal episode prior to arrival.  The patient reports she got up from a seated position to go adjusted temperature in her house that she began feeling warm and when she stood up she began feeling sweaty and lightheaded.  The patient then fell to the ground at the back of her head.  The patient is reporting head pain, neck pain, thoracic back pain, lumbar pain, bilateral knee pain.  Patient reports she has had bilateral knee replacements and would like this checked.  Patient is alert and oriented x3 at this time and with no upper or lower extremity weakness numbness or tingling.  She does go to pain management and is requesting pain medication at this time  Review of Systems   All other systems reviewed and are negative.      Past Medical History:   Diagnosis Date   • Arthritis    • Chronic back pain    • Chronic kidney disease     stage 2   • Depression    • Diabetes mellitus (CMS/HCC)    • H/O blood clots    • Hyperlipidemia    • Hypertension    • PONV (postoperative nausea and vomiting)    • Spinal headache    • Vascular disease        Allergies   Allergen Reactions   • Codeine Itching   • Amoxicillin-Pot Clavulanate Nausea And Vomiting   • Ace Inhibitors Unknown (See Comments)     CAUSE PANCREAS TO BE INFLAMMED   • Advair Diskus [Fluticasone-Salmeterol] Myalgia   • Ascorbate    • Avapro [Irbesartan] Unknown (See Comments)     PANCREATITIS    • Hydrochlorothiazide    • Irbesartan-Hydrochlorothiazide Unknown (See Comments)     PANCREATITIS     • Simvastatin Myalgia   • Thiazide-Type Diuretics Unknown (See Comments)     PANCRAETITIS        Past Surgical History:   Procedure Laterality Date   • ANTERIOR CERVICAL DISCECTOMY W/ FUSION N/A 9/17/2018    Procedure: ANTERIOR CERVICAL DISCECTOMY FUSION C4-5, C6-7 WITH INSTRUMENTATION C4-7;  Surgeon: JAXSON Rm MD;  Location: [facility] OR;  Service: Orthopedic Spine   •  APPENDECTOMY     • BACK SURGERY     • CARDIAC CATHETERIZATION     • CARPAL TUNNEL RELEASE     • CERVICAL FUSION      C5-6   •  SECTION     • CHOLECYSTECTOMY     • GANGLION CYST EXCISION Right    • HERNIA REPAIR      VENTRAL    • HYSTERECTOMY     • JOINT REPLACEMENT Bilateral 2012   • KNEE SURGERY     • LEG SURGERY      blood clot removed   • LUMBAR FUSION  2010    L2-5/L5S1   • NASAL SEPTUM SURGERY  2005   • NECK SURGERY     • RADIOFREQUENCY ABLATION Right 2014   • SHOULDER SURGERY     • TONSILLECTOMY     • TRIGGER FINGER RELEASE Left 2009   • VEIN LIGATION AND STRIPPING Left        Family History   Problem Relation Age of Onset   • Cancer Father    • Lung cancer Mother    • Heart disease Mother        Social History     Socioeconomic History   • Marital status:      Spouse name: Not on file   • Number of children: Not on file   • Years of education: Not on file   • Highest education level: Not on file   Tobacco Use   • Smoking status: Current Every Day Smoker     Packs/day: 1.00     Years: 46.00     Pack years: 46.00     Types: Cigarettes   • Smokeless tobacco: Never Used   Substance and Sexual Activity   • Alcohol use: No   • Drug use: Defer   • Sexual activity: Defer           Objective   Physical Exam   Constitutional: She is oriented to person, place, and time. She appears well-developed and well-nourished.   HENT:   Head: Normocephalic and atraumatic.   Eyes: EOM are normal. Pupils are equal, round, and reactive to light.   Neck: Normal range of motion. Neck supple.   Cardiovascular: Normal rate and regular rhythm.   Pulmonary/Chest: Effort normal and breath sounds normal.   Abdominal: Soft. Bowel sounds are normal.   Musculoskeletal: Normal range of motion. She exhibits tenderness.   Cervical thoracic and lumbar paraspinal tenderness.   Neurological: She is alert and oriented to person, place, and time. No cranial nerve deficit. Coordination normal.   Skin: Skin is warm.  Capillary refill takes less than 2 seconds.   Psychiatric: She has a normal mood and affect. Her behavior is normal.   Nursing note and vitals reviewed.      Procedures           ED Course          XR Shoulder 2+ View Right   Final Result   1. No evidence of acute bony injury in the RIGHT shoulder.       This report was finalized on 09/05/2019 18:33 by Dr. Peewee Harrington MD.      XR Chest 1 View   Final Result   1. No radiographic evidence of acute cardiopulmonary process.           This report was finalized on 09/05/2019 18:33 by Dr. Peewee Harrington MD.      XR Elbow 3+ View Right   Final Result   1. Unremarkable radiographs of the right elbow.           This report was finalized on 09/05/2019 18:33 by Dr. Peewee Harrington MD.      XR Knee 3 View Right   Final Result   1. No acute bony abnormality in the LEFT knee.           This report was finalized on 09/05/2019 18:30 by Dr. Peweee Harrington MD.      XR Knee 3 View Left   Final Result   1. No acute bony abnormality in the RIGHT knee.           This report was finalized on 09/05/2019 18:30 by Dr. Peewee Harrington MD.      CT Lumbar Spine Without Contrast   Final Result   1. Postsurgical and degenerative changes in the lumbar spine without   evidence of acute fracture or subluxation.   2. Slightly complex 3.2 cm cyst in the LEFT kidney was previously   evaluated by ultrasound on 08/30/2019. This was described as simple at   that time. The increased density in the cyst is likely due to minimal is   artifact.   This report was finalized on 09/05/2019 18:09 by Dr. Peewee Harrington MD.      CT Thoracic Spine Without Contrast   Final Result   No fracture or malalignment in the thoracic spine.           This report was finalized on 09/05/2019 18:06 by Dr. Peewee Harrington MD.      CT Head Without Contrast   Final Result   1. No acute intracranial process.           This report was finalized on 09/05/2019 18:04 by Dr. Peewee Harrington MD.      CT Cervical Spine Without Contrast    Final Result   1. Postsurgical changes without evidence of acute osseous injury in the   cervical spine.    2. Carotid artery calcifications.       This report was finalized on 09/05/2019 18:05 by Dr. Peewee Harrington MD.        Labs Reviewed   COMPREHENSIVE METABOLIC PANEL - Abnormal; Notable for the following components:       Result Value    Glucose 144 (*)     BUN 25 (*)     eGFR Non  Amer 44 (*)     All other components within normal limits    Narrative:     GFR Normal >60  Chronic Kidney Disease <60  Kidney Failure <15   PROTIME-INR - Abnormal; Notable for the following components:    INR 0.90 (*)     All other components within normal limits   URINALYSIS W/ CULTURE IF INDICATED - Abnormal; Notable for the following components:    Color, UA Dark Yellow (*)     Ketones, UA Trace (*)     Protein, UA 30 mg/dL (1+) (*)     All other components within normal limits   CBC WITH AUTO DIFFERENTIAL - Abnormal; Notable for the following components:    MCV 99.2 (*)     Monocyte % 4.9 (*)     Immature Grans % 0.9 (*)     Immature Grans, Absolute 0.09 (*)     All other components within normal limits   URINALYSIS, MICROSCOPIC ONLY - Abnormal; Notable for the following components:    WBC, UA 3-5 (*)     Bacteria, UA Trace (*)     Squamous Epithelial Cells, UA 3-6 (*)     All other components within normal limits   LIPASE - Normal   APTT - Normal   TROPONIN (IN-HOUSE) - Normal   BNP (IN-HOUSE) - Normal   TSH - Normal   MAGNESIUM - Normal   POCT GLUCOSE FINGERSTICK - Normal   POCT GLUCOSE FINGERSTICK - Normal   RAINBOW DRAW    Narrative:     The following orders were created for panel order [place] Draw.  Procedure                               Abnormality         Status                     ---------                               -----------         ------                     Light Blue Top[[id number]]                                   Final result               Green Top (Gel)[[id number]]                                   Final result               Lavender Top[[id number]]                                     Final result               Red Top[[id number]]                                          Final result                 Please view results for these tests on the individual orders.   LIGHT BLUE TOP   GREEN TOP   LAVENDER TOP   RED TOP   CBC AND DIFFERENTIAL    Narrative:     The following orders were created for panel order CBC & Differential.  Procedure                               Abnormality         Status                     ---------                               -----------         ------                     CBC Auto Differential[[id number]]        Abnormal            Final result                 Please view results for these tests on the individual orders.             MDM  Number of Diagnoses or Management Options  Strain of neck muscle, initial encounter: new and requires workup  Syncope and collapse: new and requires workup  Diagnosis management comments: Negative CTs and plain films.  Patient will be discharged stable condition with strong return precautions given       Amount and/or Complexity of Data Reviewed  Clinical lab tests: ordered and reviewed  Tests in the radiology section of CPT®: ordered and reviewed  Tests in the medicine section of CPT®: reviewed and ordered    Risk of Complications, Morbidity, and/or Mortality  Presenting problems: moderate  Diagnostic procedures: moderate  Management options: moderate    Patient Progress  Patient progress: stable      Final diagnoses:   Syncope and collapse   Strain of neck muscle, initial encounter              Jhonatan Pretty PA-C  09/06/19 0306

## 2019-09-10 ENCOUNTER — TRANSCRIBE ORDERS (OUTPATIENT)
Dept: ADMINISTRATIVE | Facility: HOSPITAL | Age: 69
End: 2019-09-10

## 2019-09-10 DIAGNOSIS — R55 SYNCOPE AND COLLAPSE: Primary | ICD-10-CM

## 2019-09-13 ENCOUNTER — HOSPITAL ENCOUNTER (OUTPATIENT)
Dept: ULTRASOUND IMAGING | Facility: HOSPITAL | Age: 69
Discharge: HOME | End: 2019-09-13
Admitting: NURSE PRACTITIONER

## 2019-09-13 DIAGNOSIS — R55 SYNCOPE AND COLLAPSE: ICD-10-CM

## 2019-09-13 PROCEDURE — 93880 EXTRACRANIAL BILAT STUDY: CPT | Performed by: SURGERY

## 2019-09-13 PROCEDURE — 93880 EXTRACRANIAL BILAT STUDY: CPT

## 2019-09-16 NOTE — PROGRESS NOTES
YOB: 1950  Location: [facility]  Location Address: [address] [place] [address], [facility], [address] [place] [address]  Location Phone: [phone]    Chief Complaint   Patient presents with   • Follow-up     tongue       History of Present Illness  Blanca Martin is a 68 y.o. female.  Blanca Martin is here for follow-up evaluation of ENT complaints. The patient has had problems with tongue sores.  The symptoms are localized to the left> right  ventral tongue. The patient has had stable symptoms. The symptoms have been present for the last several months. The symptoms are aggravated by  no identifiable factors. The symptoms are improved by medications.    Patient is a tobacco user for the last 46 years.                          Past Medical History:   Diagnosis Date   • Arthritis    • Chronic back pain    • Chronic kidney disease     stage 2   • Depression    • Diabetes mellitus (CMS/HCC)    • H/O blood clots    • Hyperlipidemia    • Hypertension    • PONV (postoperative nausea and vomiting)    • Spinal headache    • Vascular disease        Past Surgical History:   Procedure Laterality Date   • ANTERIOR CERVICAL DISCECTOMY W/ FUSION N/A 2018    Procedure: ANTERIOR CERVICAL DISCECTOMY FUSION C4-5, C6-7 WITH INSTRUMENTATION C4-7;  Surgeon: JAXSON Rm MD;  Location: [facility] OR;  Service: Orthopedic Spine   • APPENDECTOMY     • BACK SURGERY     • CARDIAC CATHETERIZATION     • CARPAL TUNNEL RELEASE     • CERVICAL FUSION      C5-6   •  SECTION     • CHOLECYSTECTOMY     • GANGLION CYST EXCISION Right    • HERNIA REPAIR      VENTRAL    • HYSTERECTOMY     • JOINT REPLACEMENT Bilateral 2012   • KNEE SURGERY     • LEG SURGERY      blood clot removed   • LUMBAR FUSION  2010    L2-5/L5S1   • NASAL SEPTUM SURGERY     • NECK SURGERY     • RADIOFREQUENCY ABLATION Right    • SHOULDER SURGERY     • TONSILLECTOMY     • TRIGGER FINGER RELEASE Left    • VEIN LIGATION  AND STRIPPING Left        Outpatient Medications Marked as Taking for the 9/17/19 encounter (Office Visit) with Peter Navarro MD   Medication Sig Dispense Refill   • aspirin 81 MG tablet      • atorvastatin (LIPITOR) 20 MG tablet Take  by mouth.     • bisoprolol (ZEBeta) 5 MG tablet Take 5 mg by mouth Daily.     • busPIRone (BUSPAR) 15 MG tablet Take 15 mg by mouth Daily As Needed (AS NEEDED FOR ANXIETY).     • Calcium Carb-Cholecalciferol (CALCIUM PLUS D3 ABSORBABLE) 600-2500 MG-UNIT capsule Take 1 tablet by mouth Daily.     • CloNIDine (CATAPRES) 0.1 MG tablet Take 0.1 mg by mouth Daily As Needed for High Blood Pressure (SYSOVER 160 COREEN. OVER 120).     • diclofenac (VOLTAREN) 75 MG EC tablet      • DULoxetine (CYMBALTA) 60 MG capsule Take 60 mg by mouth Daily.     • fluticasone (FLONASE) 50 MCG/ACT nasal spray 2 sprays into the nostril(s) as directed by provider Daily. 16 g 11   • glimepiride (AMARYL) 2 MG tablet Take 2 mg by mouth Daily As Needed (IF BS OVER 150).     • metFORMIN (GLUCOPHAGE) 1000 MG tablet Take 1,000 mg by mouth 2 (Two) Times a Day.     • montelukast (SINGULAIR) 10 MG tablet Take 10 mg by mouth Every Night.     • Multiple Vitamins-Minerals (WOMENS DAILY FORMULA) tablet Take 1 tablet by mouth Daily.     • NIFEDIPINE ER PO Take  by mouth.     • omeprazole (priLOSEC) 40 MG capsule Take 40 mg by mouth As Needed.     • oxyCODONE-acetaminophen (PERCOCET) 5-325 MG per tablet      • tiZANidine (ZANAFLEX) 4 MG tablet Take 4 mg by mouth Every 8 (Eight) Hours As Needed for Muscle Spasms.     • triamcinolone (KENALOG) 0.1 % paste Apply to lesion under tongue 2 times a day for 14 days. 5 g 0       Codeine; Amoxicillin-pot clavulanate; Ace inhibitors; Advair diskus [fluticasone-salmeterol]; Ascorbate; Avapro [irbesartan]; Hydrochlorothiazide; Irbesartan-hydrochlorothiazide; Simvastatin; and Thiazide-type diuretics    Family History   Problem Relation Age of Onset   • Cancer Father    • Lung cancer  Mother    • Heart disease Mother        Social History     Socioeconomic History   • Marital status:      Spouse name: Not on file   • Number of children: Not on file   • Years of education: Not on file   • Highest education level: Not on file   Tobacco Use   • Smoking status: Current Every Day Smoker     Packs/day: 1.00     Years: 46.00     Pack years: 46.00     Types: Cigarettes   • Smokeless tobacco: Never Used   Substance and Sexual Activity   • Alcohol use: No   • Drug use: Defer   • Sexual activity: Defer       Review of Systems   Constitutional: Negative for activity change, appetite change, chills, diaphoresis, fatigue, fever and unexpected weight change.   HENT: Negative for congestion, dental problem, drooling, ear discharge, ear pain, facial swelling, hearing loss, mouth sores, nosebleeds, postnasal drip, rhinorrhea, sinus pressure, sneezing, sore throat, tinnitus, trouble swallowing and voice change.         Tongue lesions   Eyes: Negative.    Respiratory: Negative.    Cardiovascular: Negative.    Gastrointestinal: Negative.    Endocrine: Negative.    Skin: Negative.    Allergic/Immunologic: Negative for environmental allergies, food allergies and immunocompromised state.   Neurological: Negative.    Hematological: Negative.    Psychiatric/Behavioral: Negative.        Vitals:    09/17/19 1311   BP: 109/68   Pulse: 83   Temp: 96.4 °F (35.8 °C)       Body mass index is 31.21 kg/m².    Objective     Physical Exam  CONSTITUTIONAL: well nourished, alert, oriented, in no acute distress     COMMUNICATION AND VOICE: able to communicate normally, normal voice quality    HEAD: normocephalic, no lesions, atraumatic, no tenderness, no masses     FACE: appearance normal, no lesions, no tenderness, no deformities, facial motion symmetric    SALIVARY GLANDS: parotid glands with no tenderness, no swelling, no masses, submandibular glands with normal size, nontender    EYES: ocular motility normal, eyelids  normal, orbits normal, no proptosis, conjunctiva normal , pupils equal, round     EARS:  Hearing: response to conversational voice normal bilaterally   External Ears: auricles without lesions  Otoscopic: tympanic membrane appearance normal, no lesions, no perforation, normal mobility, no fluid    NOSE:  External Nose: structure normal, no tenderness on palpation, no nasal discharge, no lesions, no evidence of trauma, nostrils patent   Intranasal Exam: nasal mucosa normal, vestibule within normal limits, inferior turbinate normal, nasal septum midline      ORAL:  Lips: upper and lower lips without lesion   Teeth: dentition within normal limits for age   Gums: gingivae healthy   Oral Mucosa: oral mucosa normal, no mucosal lesions   Floor of Mouth: Warthin’s duct patent, mucosa normal  Tongue: lingual mucosa with bilateral lateral leukoplakia and base of ventral surface, stable from previous exam, no growth, ulcerations, or growths, normal tongue mobility   Palate: soft and hard palates with normal mucosa and structure  Oropharynx: oropharyngeal mucosa normal    NECK: neck appearance normal, no mass,  noted without erythema or tenderness    THYROID: no overt thyromegaly, no tenderness, nodules or mass present on palpation, position midline     LYMPH NODES: no lymphadenopathy    CHEST/RESPIRATORY: respiratory effort normal, normal breath sounds     CARDIOVASCULAR: rate and rhythm normal, extremities without cyanosis or edema      NEUROLOGIC/PSYCHIATRIC: oriented to time, place and person, mood normal, affect appropriate, CN II-XII intact grossly    Assessment/Plan   Blanca was seen today for follow-up.    Diagnoses and all orders for this visit:    Oral leukoplakia ventral tongue      * Surgery not found *  No orders of the defined types were placed in this encounter.    Return in about 6 months (around 3/17/2020) for Recheck tongue.       Patient Instructions   Be aware of the signs and symptoms of head and neck  cancer including neck mass, persistent sore throat, ear pain, hemoptysis, weight loss and hoarseness. If any of these symptoms occur, call for evaluation.     Will continue to monitor tongue at this time, if symptoms worsen will consider biopsy.    For more information:  [employer] [place]  1-800-QUIT-NOW  [email or web address]

## 2019-09-17 ENCOUNTER — OFFICE VISIT (OUTPATIENT)
Dept: OTOLARYNGOLOGY | Facility: CLINIC | Age: 69
End: 2019-09-17

## 2019-09-17 VITALS
TEMPERATURE: 96.4 F | HEART RATE: 83 BPM | SYSTOLIC BLOOD PRESSURE: 109 MMHG | WEIGHT: 179 LBS | DIASTOLIC BLOOD PRESSURE: 68 MMHG | HEIGHT: 64 IN | BODY MASS INDEX: 30.56 KG/M2

## 2019-09-17 DIAGNOSIS — K13.21 ORAL LEUKOPLAKIA: Primary | ICD-10-CM

## 2019-09-17 PROCEDURE — 99214 OFFICE O/P EST MOD 30 MIN: CPT | Performed by: PHYSICIAN ASSISTANT

## 2019-09-17 NOTE — PATIENT INSTRUCTIONS
Be aware of the signs and symptoms of head and neck cancer including neck mass, persistent sore throat, ear pain, hemoptysis, weight loss and hoarseness. If any of these symptoms occur, call for evaluation.     Will continue to monitor tongue at this time, if symptoms worsen will consider biopsy.    For more information:  [employer] [place]  1-800-QUIT-NOW  [email or web address]

## 2019-10-10 ENCOUNTER — HOSPITAL ENCOUNTER (OUTPATIENT)
Dept: PHYSICAL THERAPY | Age: 69
Setting detail: THERAPIES SERIES
Discharge: HOME | End: 2019-10-10
Payer: MEDICARE

## 2019-10-10 PROCEDURE — 95992 CANALITH REPOSITIONING PROC: CPT

## 2019-10-10 PROCEDURE — 97162 PT EVAL MOD COMPLEX 30 MIN: CPT

## 2019-10-14 ENCOUNTER — HOSPITAL ENCOUNTER (OUTPATIENT)
Dept: PHYSICAL THERAPY | Age: 69
Setting detail: THERAPIES SERIES
Discharge: HOME | End: 2019-10-14
Payer: MEDICARE

## 2019-10-14 PROCEDURE — 97112 NEUROMUSCULAR REEDUCATION: CPT

## 2019-10-17 ENCOUNTER — HOSPITAL ENCOUNTER (OUTPATIENT)
Dept: PHYSICAL THERAPY | Age: 69
Setting detail: THERAPIES SERIES
Discharge: HOME | End: 2019-10-17
Payer: MEDICARE

## 2019-10-17 PROCEDURE — 97112 NEUROMUSCULAR REEDUCATION: CPT

## 2019-10-21 ENCOUNTER — APPOINTMENT (OUTPATIENT)
Dept: PHYSICAL THERAPY | Age: 69
End: 2019-10-21
Payer: MEDICARE

## 2019-10-24 ENCOUNTER — APPOINTMENT (OUTPATIENT)
Dept: PHYSICAL THERAPY | Age: 69
End: 2019-10-24
Payer: MEDICARE

## 2019-10-29 ENCOUNTER — APPOINTMENT (OUTPATIENT)
Dept: PHYSICAL THERAPY | Age: 69
End: 2019-10-29
Payer: MEDICARE

## 2019-10-31 ENCOUNTER — APPOINTMENT (OUTPATIENT)
Dept: PHYSICAL THERAPY | Age: 69
End: 2019-10-31
Payer: MEDICARE

## 2019-11-18 LAB
ALBUMIN SERPL-MCNC: 4.2 G/DL (ref 3.5–5.2)
ALP BLD-CCNC: 64 U/L (ref 35–104)
ALT SERPL-CCNC: 25 U/L (ref 5–33)
ANION GAP SERPL CALCULATED.3IONS-SCNC: 17 MMOL/L (ref 7–19)
AST SERPL-CCNC: 17 U/L (ref 5–32)
BILIRUB SERPL-MCNC: <0.2 MG/DL (ref 0.2–1.2)
BUN BLDV-MCNC: 22 MG/DL (ref 8–23)
CALCIUM SERPL-MCNC: 9.1 MG/DL (ref 8.8–10.2)
CHLORIDE BLD-SCNC: 103 MMOL/L (ref 98–111)
CHOLESTEROL, TOTAL: 194 MG/DL (ref 160–199)
CO2: 20 MMOL/L (ref 22–29)
CREAT SERPL-MCNC: 0.9 MG/DL (ref 0.5–0.9)
CREATININE URINE: 101 MG/DL (ref 4.2–622)
GFR NON-AFRICAN AMERICAN: >60
GLUCOSE BLD-MCNC: 143 MG/DL (ref 74–109)
HBA1C MFR BLD: 7.8 % (ref 4–6)
HDLC SERPL-MCNC: 35 MG/DL (ref 65–121)
LDL CHOLESTEROL CALCULATED: 122 MG/DL
MICROALBUMIN UR-MCNC: 3.5 MG/DL (ref 0–19)
MICROALBUMIN/CREAT UR-RTO: 34.7 MG/G
POTASSIUM SERPL-SCNC: 4.6 MMOL/L (ref 3.5–5)
SODIUM BLD-SCNC: 140 MMOL/L (ref 136–145)
T4 FREE: 1.23 NG/DL (ref 0.93–1.7)
TOTAL PROTEIN: 7 G/DL (ref 6.6–8.7)
TRIGL SERPL-MCNC: 184 MG/DL (ref 0–149)
TSH SERPL DL<=0.05 MIU/L-ACNC: 2.09 UIU/ML (ref 0.27–4.2)

## 2019-12-27 LAB
ALBUMIN SERPL-MCNC: 4.4 G/DL (ref 3.5–5.2)
ALP BLD-CCNC: 62 U/L (ref 35–104)
ALT SERPL-CCNC: 20 U/L (ref 5–33)
AMYLASE: 36 U/L (ref 28–100)
ANION GAP SERPL CALCULATED.3IONS-SCNC: 15 MMOL/L (ref 7–19)
AST SERPL-CCNC: 13 U/L (ref 5–32)
BASOPHILS ABSOLUTE: 0.1 K/UL (ref 0–0.2)
BASOPHILS RELATIVE PERCENT: 0.8 % (ref 0–1)
BILIRUB SERPL-MCNC: <0.2 MG/DL (ref 0.2–1.2)
BUN BLDV-MCNC: 21 MG/DL (ref 8–23)
CALCIUM SERPL-MCNC: 9.5 MG/DL (ref 8.8–10.2)
CHLORIDE BLD-SCNC: 98 MMOL/L (ref 98–111)
CHOLESTEROL, TOTAL: 131 MG/DL (ref 160–199)
CO2: 23 MMOL/L (ref 22–29)
CREAT SERPL-MCNC: 1.1 MG/DL (ref 0.5–0.9)
EOSINOPHILS ABSOLUTE: 0.1 K/UL (ref 0–0.6)
EOSINOPHILS RELATIVE PERCENT: 1.6 % (ref 0–5)
GFR NON-AFRICAN AMERICAN: 49
GLUCOSE BLD-MCNC: 200 MG/DL (ref 74–109)
HCT VFR BLD CALC: 42.7 % (ref 37–47)
HDLC SERPL-MCNC: 42 MG/DL (ref 65–121)
HEMOGLOBIN: 14.1 G/DL (ref 12–16)
IMMATURE GRANULOCYTES #: 0.1 K/UL
LDL CHOLESTEROL CALCULATED: 58 MG/DL
LYMPHOCYTES ABSOLUTE: 2.1 K/UL (ref 1.1–4.5)
LYMPHOCYTES RELATIVE PERCENT: 28.7 % (ref 20–40)
MCH RBC QN AUTO: 32.9 PG (ref 27–31)
MCHC RBC AUTO-ENTMCNC: 33 G/DL (ref 33–37)
MCV RBC AUTO: 99.5 FL (ref 81–99)
MONOCYTES ABSOLUTE: 0.5 K/UL (ref 0–0.9)
MONOCYTES RELATIVE PERCENT: 6.5 % (ref 0–10)
NEUTROPHILS ABSOLUTE: 4.6 K/UL (ref 1.5–7.5)
NEUTROPHILS RELATIVE PERCENT: 61.7 % (ref 50–65)
PDW BLD-RTO: 13.1 % (ref 11.5–14.5)
PLATELET # BLD: 268 K/UL (ref 130–400)
PMV BLD AUTO: 9.3 FL (ref 9.4–12.3)
POTASSIUM SERPL-SCNC: 5.5 MMOL/L (ref 3.5–5)
RBC # BLD: 4.29 M/UL (ref 4.2–5.4)
SODIUM BLD-SCNC: 136 MMOL/L (ref 136–145)
TOTAL PROTEIN: 7.3 G/DL (ref 6.6–8.7)
TRIGL SERPL-MCNC: 156 MG/DL (ref 0–149)
WBC # BLD: 7.4 K/UL (ref 4.8–10.8)

## 2020-01-02 ENCOUNTER — APPOINTMENT (OUTPATIENT)
Dept: CT IMAGING | Age: 70
DRG: 392 | End: 2020-01-02
Payer: MEDICARE

## 2020-01-02 ENCOUNTER — HOSPITAL ENCOUNTER (INPATIENT)
Age: 70
LOS: 2 days | Discharge: HOME HEALTH | DRG: 392 | End: 2020-01-04
Attending: EMERGENCY MEDICINE | Admitting: FAMILY MEDICINE
Payer: MEDICARE

## 2020-01-02 PROBLEM — K29.80 DUODENITIS: Status: ACTIVE | Noted: 2020-01-02

## 2020-01-02 LAB
ALBUMIN SERPL-MCNC: 3.4 G/DL (ref 3.5–5.2)
ALP BLD-CCNC: 71 U/L (ref 35–104)
ALT SERPL-CCNC: 21 U/L (ref 5–33)
ANION GAP SERPL CALCULATED.3IONS-SCNC: 17 MMOL/L (ref 7–19)
AST SERPL-CCNC: 14 U/L (ref 5–32)
BACTERIA: NORMAL /HPF
BASE EXCESS ARTERIAL: -3.3 MMOL/L (ref -2–2)
BASOPHILS ABSOLUTE: 0.1 K/UL (ref 0–0.2)
BASOPHILS RELATIVE PERCENT: 0.8 % (ref 0–1)
BILIRUB SERPL-MCNC: 0.3 MG/DL (ref 0.2–1.2)
BILIRUBIN URINE: NEGATIVE
BLOOD, URINE: NEGATIVE
BUN BLDV-MCNC: 26 MG/DL (ref 8–23)
CALCIUM SERPL-MCNC: 8.6 MG/DL (ref 8.8–10.2)
CARBOXYHEMOGLOBIN ARTERIAL: 1.5 % (ref 0–5)
CHLORIDE BLD-SCNC: 94 MMOL/L (ref 98–111)
CLARITY: ABNORMAL
CO2: 18 MMOL/L (ref 22–29)
COLOR: YELLOW
CREAT SERPL-MCNC: 1.2 MG/DL (ref 0.5–0.9)
EKG P AXIS: 18 DEGREES
EKG P-R INTERVAL: 126 MS
EKG Q-T INTERVAL: 408 MS
EKG QRS DURATION: 86 MS
EKG QTC CALCULATION (BAZETT): 432 MS
EKG T AXIS: 52 DEGREES
EOSINOPHILS ABSOLUTE: 0.1 K/UL (ref 0–0.6)
EOSINOPHILS RELATIVE PERCENT: 1.1 % (ref 0–5)
EPITHELIAL CELLS, UA: NORMAL /HPF
GFR NON-AFRICAN AMERICAN: 44
GLUCOSE BLD-MCNC: 322 MG/DL (ref 74–109)
GLUCOSE URINE: >=1000 MG/DL
HCO3 ARTERIAL: 19.9 MMOL/L (ref 22–26)
HCT VFR BLD CALC: 46.5 % (ref 37–47)
HEMOGLOBIN, ART, EXTENDED: 14.2 G/DL (ref 12–16)
HEMOGLOBIN: 15.6 G/DL (ref 12–16)
IMMATURE GRANULOCYTES #: 0.1 K/UL
KETONES, URINE: NEGATIVE MG/DL
LEUKOCYTE ESTERASE, URINE: NEGATIVE
LIPASE: 60 U/L (ref 13–60)
LYMPHOCYTES ABSOLUTE: 2.5 K/UL (ref 1.1–4.5)
LYMPHOCYTES RELATIVE PERCENT: 31.7 % (ref 20–40)
MCH RBC QN AUTO: 32.6 PG (ref 27–31)
MCHC RBC AUTO-ENTMCNC: 33.5 G/DL (ref 33–37)
MCV RBC AUTO: 97.1 FL (ref 81–99)
METHEMOGLOBIN ARTERIAL: 1 %
MONOCYTES ABSOLUTE: 0.5 K/UL (ref 0–0.9)
MONOCYTES RELATIVE PERCENT: 5.8 % (ref 0–10)
NEUTROPHILS ABSOLUTE: 4.7 K/UL (ref 1.5–7.5)
NEUTROPHILS RELATIVE PERCENT: 59.3 % (ref 50–65)
NITRITE, URINE: NEGATIVE
O2 CONTENT ARTERIAL: 19 ML/DL
O2 SAT, ARTERIAL: 94.9 %
O2 THERAPY: ABNORMAL
PCO2 ARTERIAL: 30 MMHG (ref 35–45)
PDW BLD-RTO: 12.7 % (ref 11.5–14.5)
PH ARTERIAL: 7.43 (ref 7.35–7.45)
PH UA: 6 (ref 5–8)
PLATELET # BLD: 317 K/UL (ref 130–400)
PMV BLD AUTO: 10.4 FL (ref 9.4–12.3)
PO2 ARTERIAL: 92 MMHG (ref 80–100)
POTASSIUM REFLEX MAGNESIUM: 3.6 MMOL/L (ref 3.5–5)
POTASSIUM, WHOLE BLOOD: 3.4
PROTEIN UA: 30 MG/DL
RBC # BLD: 4.79 M/UL (ref 4.2–5.4)
REASON FOR REJECTION: NORMAL
REJECTED TEST: NORMAL
SODIUM BLD-SCNC: 129 MMOL/L (ref 136–145)
SPECIFIC GRAVITY UA: 1.04 (ref 1–1.03)
TOTAL PROTEIN: 6.4 G/DL (ref 6.6–8.7)
TROPONIN: <0.01 NG/ML (ref 0–0.03)
URINE REFLEX TO CULTURE: ABNORMAL
UROBILINOGEN, URINE: 0.2 E.U./DL
WBC # BLD: 8 K/UL (ref 4.8–10.8)
WBC UA: NORMAL /HPF (ref 0–5)

## 2020-01-02 PROCEDURE — 6360000002 HC RX W HCPCS: Performed by: NURSE PRACTITIONER

## 2020-01-02 PROCEDURE — 6360000002 HC RX W HCPCS: Performed by: FAMILY MEDICINE

## 2020-01-02 PROCEDURE — 36415 COLL VENOUS BLD VENIPUNCTURE: CPT

## 2020-01-02 PROCEDURE — C9113 INJ PANTOPRAZOLE SODIUM, VIA: HCPCS | Performed by: NURSE PRACTITIONER

## 2020-01-02 PROCEDURE — 2580000003 HC RX 258: Performed by: FAMILY MEDICINE

## 2020-01-02 PROCEDURE — 96375 TX/PRO/DX INJ NEW DRUG ADDON: CPT

## 2020-01-02 PROCEDURE — 6370000000 HC RX 637 (ALT 250 FOR IP): Performed by: NURSE PRACTITIONER

## 2020-01-02 PROCEDURE — 36600 WITHDRAWAL OF ARTERIAL BLOOD: CPT

## 2020-01-02 PROCEDURE — 96374 THER/PROPH/DIAG INJ IV PUSH: CPT

## 2020-01-02 PROCEDURE — 96361 HYDRATE IV INFUSION ADD-ON: CPT

## 2020-01-02 PROCEDURE — 2580000003 HC RX 258: Performed by: NURSE PRACTITIONER

## 2020-01-02 PROCEDURE — 81001 URINALYSIS AUTO W/SCOPE: CPT

## 2020-01-02 PROCEDURE — 85025 COMPLETE CBC W/AUTO DIFF WBC: CPT

## 2020-01-02 PROCEDURE — 84484 ASSAY OF TROPONIN QUANT: CPT

## 2020-01-02 PROCEDURE — 74177 CT ABD & PELVIS W/CONTRAST: CPT

## 2020-01-02 PROCEDURE — 83690 ASSAY OF LIPASE: CPT

## 2020-01-02 PROCEDURE — 94640 AIRWAY INHALATION TREATMENT: CPT

## 2020-01-02 PROCEDURE — 6370000000 HC RX 637 (ALT 250 FOR IP): Performed by: FAMILY MEDICINE

## 2020-01-02 PROCEDURE — 80053 COMPREHEN METABOLIC PANEL: CPT

## 2020-01-02 PROCEDURE — 1210000000 HC MED SURG R&B

## 2020-01-02 PROCEDURE — 96372 THER/PROPH/DIAG INJ SC/IM: CPT

## 2020-01-02 PROCEDURE — 82803 BLOOD GASES ANY COMBINATION: CPT

## 2020-01-02 PROCEDURE — 99285 EMERGENCY DEPT VISIT HI MDM: CPT

## 2020-01-02 PROCEDURE — 96376 TX/PRO/DX INJ SAME DRUG ADON: CPT

## 2020-01-02 PROCEDURE — 93005 ELECTROCARDIOGRAM TRACING: CPT | Performed by: NURSE PRACTITIONER

## 2020-01-02 PROCEDURE — 6360000004 HC RX CONTRAST MEDICATION: Performed by: NURSE PRACTITIONER

## 2020-01-02 PROCEDURE — 84132 ASSAY OF SERUM POTASSIUM: CPT

## 2020-01-02 RX ORDER — DULOXETIN HYDROCHLORIDE 60 MG/1
60 CAPSULE, DELAYED RELEASE ORAL DAILY
Status: DISCONTINUED | OUTPATIENT
Start: 2020-01-02 | End: 2020-01-04 | Stop reason: HOSPADM

## 2020-01-02 RX ORDER — SODIUM CHLORIDE 0.9 % (FLUSH) 0.9 %
10 SYRINGE (ML) INJECTION EVERY 12 HOURS SCHEDULED
Status: DISCONTINUED | OUTPATIENT
Start: 2020-01-02 | End: 2020-01-04 | Stop reason: HOSPADM

## 2020-01-02 RX ORDER — ONDANSETRON 2 MG/ML
4 INJECTION INTRAMUSCULAR; INTRAVENOUS ONCE
Status: COMPLETED | OUTPATIENT
Start: 2020-01-02 | End: 2020-01-02

## 2020-01-02 RX ORDER — ENALAPRIL MALEATE 5 MG/1
5 TABLET ORAL DAILY
Status: DISCONTINUED | OUTPATIENT
Start: 2020-01-02 | End: 2020-01-03

## 2020-01-02 RX ORDER — BUDESONIDE 0.25 MG/2ML
0.5 INHALANT ORAL 2 TIMES DAILY
Status: DISCONTINUED | OUTPATIENT
Start: 2020-01-02 | End: 2020-01-04 | Stop reason: HOSPADM

## 2020-01-02 RX ORDER — SODIUM CHLORIDE 9 MG/ML
INJECTION, SOLUTION INTRAVENOUS CONTINUOUS
Status: DISCONTINUED | OUTPATIENT
Start: 2020-01-02 | End: 2020-01-04 | Stop reason: HOSPADM

## 2020-01-02 RX ORDER — PANTOPRAZOLE SODIUM 40 MG/1
40 TABLET, DELAYED RELEASE ORAL
Status: DISCONTINUED | OUTPATIENT
Start: 2020-01-03 | End: 2020-01-02

## 2020-01-02 RX ORDER — ONDANSETRON 2 MG/ML
INJECTION INTRAMUSCULAR; INTRAVENOUS
Status: DISPENSED
Start: 2020-01-02 | End: 2020-01-03

## 2020-01-02 RX ORDER — 0.9 % SODIUM CHLORIDE 0.9 %
500 INTRAVENOUS SOLUTION INTRAVENOUS ONCE
Status: COMPLETED | OUTPATIENT
Start: 2020-01-02 | End: 2020-01-02

## 2020-01-02 RX ORDER — MORPHINE SULFATE 4 MG/ML
4 INJECTION, SOLUTION INTRAMUSCULAR; INTRAVENOUS ONCE
Status: COMPLETED | OUTPATIENT
Start: 2020-01-02 | End: 2020-01-02

## 2020-01-02 RX ORDER — CLONIDINE HYDROCHLORIDE 0.1 MG/1
0.1 TABLET ORAL ONCE
Status: COMPLETED | OUTPATIENT
Start: 2020-01-02 | End: 2020-01-02

## 2020-01-02 RX ORDER — ALBUTEROL SULFATE 2.5 MG/3ML
2.5 SOLUTION RESPIRATORY (INHALATION) 4 TIMES DAILY
Status: DISCONTINUED | OUTPATIENT
Start: 2020-01-02 | End: 2020-01-04 | Stop reason: HOSPADM

## 2020-01-02 RX ORDER — DICLOFENAC SODIUM 1 MG/ML
1 SOLUTION/ DROPS OPHTHALMIC 4 TIMES DAILY
Status: DISCONTINUED | OUTPATIENT
Start: 2020-01-02 | End: 2020-01-04 | Stop reason: HOSPADM

## 2020-01-02 RX ORDER — PANTOPRAZOLE SODIUM 40 MG/10ML
40 INJECTION, POWDER, LYOPHILIZED, FOR SOLUTION INTRAVENOUS DAILY
Status: DISCONTINUED | OUTPATIENT
Start: 2020-01-02 | End: 2020-01-04

## 2020-01-02 RX ORDER — SODIUM CHLORIDE 0.9 % (FLUSH) 0.9 %
10 SYRINGE (ML) INJECTION PRN
Status: DISCONTINUED | OUTPATIENT
Start: 2020-01-02 | End: 2020-01-04 | Stop reason: HOSPADM

## 2020-01-02 RX ORDER — TIZANIDINE 4 MG/1
4 TABLET ORAL EVERY 8 HOURS PRN
Status: DISCONTINUED | OUTPATIENT
Start: 2020-01-02 | End: 2020-01-04 | Stop reason: HOSPADM

## 2020-01-02 RX ORDER — ACETAMINOPHEN 325 MG/1
650 TABLET ORAL EVERY 4 HOURS PRN
Status: DISCONTINUED | OUTPATIENT
Start: 2020-01-02 | End: 2020-01-04 | Stop reason: HOSPADM

## 2020-01-02 RX ORDER — BUSPIRONE HYDROCHLORIDE 15 MG/1
15 TABLET ORAL DAILY PRN
Status: DISCONTINUED | OUTPATIENT
Start: 2020-01-02 | End: 2020-01-04 | Stop reason: HOSPADM

## 2020-01-02 RX ORDER — NIFEDIPINE 30 MG/1
30 TABLET, EXTENDED RELEASE ORAL DAILY
Status: DISCONTINUED | OUTPATIENT
Start: 2020-01-03 | End: 2020-01-04 | Stop reason: HOSPADM

## 2020-01-02 RX ORDER — ONDANSETRON 2 MG/ML
4 INJECTION INTRAMUSCULAR; INTRAVENOUS EVERY 8 HOURS PRN
Status: DISCONTINUED | OUTPATIENT
Start: 2020-01-02 | End: 2020-01-04 | Stop reason: HOSPADM

## 2020-01-02 RX ORDER — CLONIDINE HYDROCHLORIDE 0.1 MG/1
0.1 TABLET ORAL DAILY PRN
Status: DISCONTINUED | OUTPATIENT
Start: 2020-01-02 | End: 2020-01-04 | Stop reason: HOSPADM

## 2020-01-02 RX ORDER — SODIUM CHLORIDE 9 MG/ML
10 INJECTION INTRAVENOUS DAILY
Status: DISCONTINUED | OUTPATIENT
Start: 2020-01-02 | End: 2020-01-04 | Stop reason: HOSPADM

## 2020-01-02 RX ADMIN — BUDESONIDE 250 MCG: 0.25 INHALANT RESPIRATORY (INHALATION) at 20:15

## 2020-01-02 RX ADMIN — SODIUM CHLORIDE: 9 INJECTION, SOLUTION INTRAVENOUS at 17:42

## 2020-01-02 RX ADMIN — IOPAMIDOL 90 ML: 755 INJECTION, SOLUTION INTRAVENOUS at 11:56

## 2020-01-02 RX ADMIN — CLONIDINE HYDROCHLORIDE 0.1 MG: 0.1 TABLET ORAL at 15:13

## 2020-01-02 RX ADMIN — ONDANSETRON 4 MG: 2 INJECTION INTRAMUSCULAR; INTRAVENOUS at 10:58

## 2020-01-02 RX ADMIN — ONDANSETRON 4 MG: 2 INJECTION INTRAMUSCULAR; INTRAVENOUS at 16:40

## 2020-01-02 RX ADMIN — ENOXAPARIN SODIUM 40 MG: 40 INJECTION SUBCUTANEOUS at 17:42

## 2020-01-02 RX ADMIN — MORPHINE SULFATE 4 MG: 4 INJECTION, SOLUTION INTRAMUSCULAR; INTRAVENOUS at 10:58

## 2020-01-02 RX ADMIN — PANTOPRAZOLE SODIUM 40 MG: 40 INJECTION, POWDER, LYOPHILIZED, FOR SOLUTION INTRAVENOUS at 15:15

## 2020-01-02 RX ADMIN — SODIUM CHLORIDE 500 ML: 9 INJECTION, SOLUTION INTRAVENOUS at 10:58

## 2020-01-02 RX ADMIN — MAGNESIUM CITRATE 296 ML: 1.75 LIQUID ORAL at 15:15

## 2020-01-02 RX ADMIN — ALBUTEROL SULFATE 2.5 MG: 2.5 SOLUTION RESPIRATORY (INHALATION) at 20:15

## 2020-01-02 RX ADMIN — METOPROLOL TARTRATE 25 MG: 25 TABLET ORAL at 21:58

## 2020-01-02 RX ADMIN — SODIUM CHLORIDE, PRESERVATIVE FREE 10 ML: 5 INJECTION INTRAVENOUS at 15:15

## 2020-01-02 ASSESSMENT — ENCOUNTER SYMPTOMS
ANAL BLEEDING: 1
CONSTIPATION: 1
ABDOMINAL PAIN: 1

## 2020-01-02 ASSESSMENT — PAIN SCALES - GENERAL
PAINLEVEL_OUTOF10: 5
PAINLEVEL_OUTOF10: 5
PAINLEVEL_OUTOF10: 0

## 2020-01-02 ASSESSMENT — PAIN DESCRIPTION - LOCATION: LOCATION: ABDOMEN

## 2020-01-02 NOTE — ED PROVIDER NOTES
Attending Supervisory Note/Shared Visit    I have reviewed the mid-levels findings and agree. We have discussed the case and reviewed the diagnostic data together. I am in agreement with consulting with the patient's clinician possibly suggesting admission hydration and repeat labs with GI consultation. At least speaking with the clinician we can set up aftercare if he prefer she come to the office.     Teodoro Fritz MD  Attending Emergency Physician        Edinson Hernández MD  01/02/20 7221

## 2020-01-02 NOTE — ED PROVIDER NOTES
Relation Age of Onset    Heart Disease Mother     Kidney Disease Mother     Cancer Father         liver cancer    Liver Cancer Father     High Blood Pressure Sister     High Blood Pressure Brother     High Cholesterol Brother     Heart Disease Brother     Colon Cancer Neg Hx     Colon Polyps Neg Hx     Esophageal Cancer Neg Hx     Liver Disease Neg Hx     Rectal Cancer Neg Hx     Stomach Cancer Neg Hx           SOCIAL HISTORY       Social History     Socioeconomic History    Marital status:      Spouse name: None    Number of children: None    Years of education: None    Highest education level: None   Occupational History    None   Social Needs    Financial resource strain: None    Food insecurity:     Worry: None     Inability: None    Transportation needs:     Medical: None     Non-medical: None   Tobacco Use    Smoking status: Current Every Day Smoker     Packs/day: 1.00     Years: 40.00     Pack years: 40.00     Types: Cigarettes    Smokeless tobacco: Never Used   Substance and Sexual Activity    Alcohol use: No    Drug use: No    Sexual activity: None   Lifestyle    Physical activity:     Days per week: None     Minutes per session: None    Stress: None   Relationships    Social connections:     Talks on phone: None     Gets together: None     Attends [employer] service: None     Active member of club or organization: None     Attends meetings of clubs or organizations: None     Relationship status: None    Intimate partner violence:     Fear of current or ex partner: None     Emotionally abused: None     Physically abused: None     Forced sexual activity: None   Other Topics Concern    None   Social History Narrative    None       SCREENINGS             PHYSICAL EXAM    (up to 7 for level 4, 8 or more for level 5)     ED Triage Vitals   BP Temp Temp src Pulse Resp SpO2 Height Weight   -- -- -- -- -- -- -- --     Vitals:    01/02/20 1032 01/02/20 1102 01/02/20 1131 01/02/20 1155   BP: 134/80 (!) 148/89 (!) 153/83    Pulse:       Resp:       Temp:       TempSrc:       SpO2: 94% 94% 92% 93%   Weight:       Height:             Physical Exam  Vitals signs reviewed. HENT:      Head: Normocephalic. Right Ear: External ear normal.      Left Ear: External ear normal.   Eyes:      Conjunctiva/sclera: Conjunctivae normal.      Pupils: Pupils are equal, round, and reactive to light. Neck:      Musculoskeletal: Normal range of motion. Cardiovascular:      Rate and Rhythm: Normal rate and regular rhythm. Heart sounds: Normal heart sounds. Pulmonary:      Effort: Pulmonary effort is normal.      Breath sounds: Normal breath sounds. Abdominal:      General: Bowel sounds are normal.      Palpations: Abdomen is soft. Genitourinary:     Rectum: Guaiac result negative. Comments: No obvious blood on exam glove, rectal vault empty, no stool impaction on BRIANNA-chaperoned by George Bruce CNA  Musculoskeletal: Normal range of motion. Skin:     General: Skin is warm and dry. Neurological:      Mental Status: She is alert and oriented to person, place, and time. DIAGNOSTIC RESULTS     EKG: All EKG's are interpreted by the Emergency Department Physician who either signs or Co-signs this chart in the absence of acardiologist.    There is a regular rate and rhythm. SR hr 74b/min Normal AL interval and normal P waves. Normal QRS interval. Normal QT interval. No obvious ST elevation or ST depression. RADIOLOGY:   Non-plain film images such as CT, Ultrasound andMRI are read by the radiologist. Plain radiographic images are visualized and preliminarily interpreted by the emergency physician with the below findings:        Interpretation per the Radiologist below, if available at the time of this note:    CT ABDOMEN PELVIS W IV CONTRAST Additional Contrast? Oral   Final Result   1.  Mild haziness at the pancreaticoduodenal groove, which could be   seen with pancreatitis or

## 2020-01-02 NOTE — ED NOTES
Called Dr Alverto Diaz @ [phone] @ [phone]     [facility]  01/02/20 4051

## 2020-01-02 NOTE — PROGRESS NOTES
4 Eyes Skin Assessment    Mariam Henderson is being assessed upon: Admission    I agree that I, Orquidea Lazo, along with uRddy Joya RN (either 2 RN's or 1 LPN and 1 RN) have performed a thorough Head to Toe Skin Assessment on the patient. ALL assessment sites listed below have been assessed. Areas assessed by both nurses:     [x]   Head, Face, and Ears   [x]   Shoulders, Back, and Chest  [x]   Arms, Elbows, and Hands   [x]   Coccyx, Sacrum, and Ischium  [x]   Legs, Feet, and Heels    Does the Patient have Skin Breakdown?  No    Angus Prevention initiated: No  Wound Care Orders initiated: No    WO nurse consulted for Pressure Injury (Stage 3,4, Unstageable, DTI, NWPT, and Complex wounds) and New or Established Ostomies: No        Primary Nurse eSignature: Orquidea Lazo RN on 1/2/2020 at 5:23 PM      Co-Signer eSignature: Electronically signed by Prentiss Nissen, RN on 1/2/20 at 5:36 PM

## 2020-01-02 NOTE — PROGRESS NOTES
1/2/2020 12:35 PM - [facility] Incoming Lab Results From [employer] Value Ref Range & Units Status Collected Lab   pH, Arterial 7.430  7.350 - 7.450 Final 01/02/2020 12:32 PM [address] [place] [address] Lab   pCO2, Arterial 30. 0Low   35.0 - 45.0 mmHg Final 01/02/2020 12:32 PM [address] [place] [address] Lab   pO2, Arterial 92.0  80.0 - 100.0 mmHg Final 01/02/2020 12:32 PM [address] [place] [address] Lab   HCO3, Arterial 19.9Low   22.0 - 26.0 mmol/L Final 01/02/2020 12:32 PM [facility] Excess, Arterial -3.3Low   -2.0 - 2.0 mmol/L Final 01/02/2020 12:32 PM [address] [place] [address] Lab   Hemoglobin, Art, Extended 14.2  12.0 - 16.0 g/dL Final 01/02/2020 12:32 PM [address] [place] [address] Lab   O2 Sat, Arterial 94.9  >92 % Final 01/02/2020 12:32 PM [address] [place] [address] Lab   Carboxyhgb, Arterial 1.5  0.0 - 5.0 % Final 01/02/2020 12:32 PM [facility] Lab        0.0-1.5   (Smokers 1.5-5.0)    Methemoglobin, Arterial 1.0  <1.5 % Final 01/02/2020 12:32 PM [address] [place] [address] Lab   O2 Content, Arterial 19.0  Not Established mL/dL Final 01/02/2020 12:32 PM [address] [place] [address] Lab   O2 Therapy          Pt on room air, rr, AT+

## 2020-01-03 LAB
ALBUMIN SERPL-MCNC: 3.5 G/DL (ref 3.5–5.2)
ALP BLD-CCNC: 74 U/L (ref 35–104)
ALT SERPL-CCNC: 55 U/L (ref 5–33)
AMYLASE: 28 U/L (ref 28–100)
ANION GAP SERPL CALCULATED.3IONS-SCNC: 11 MMOL/L (ref 7–19)
AST SERPL-CCNC: 54 U/L (ref 5–32)
BASOPHILS ABSOLUTE: 0.1 K/UL (ref 0–0.2)
BASOPHILS RELATIVE PERCENT: 0.8 % (ref 0–1)
BILIRUB SERPL-MCNC: <0.2 MG/DL (ref 0.2–1.2)
BUN BLDV-MCNC: 28 MG/DL (ref 8–23)
CALCIUM SERPL-MCNC: 8.5 MG/DL (ref 8.8–10.2)
CHLORIDE BLD-SCNC: 98 MMOL/L (ref 98–111)
CO2: 27 MMOL/L (ref 22–29)
CREAT SERPL-MCNC: 1.3 MG/DL (ref 0.5–0.9)
EOSINOPHILS ABSOLUTE: 0.1 K/UL (ref 0–0.6)
EOSINOPHILS RELATIVE PERCENT: 1.1 % (ref 0–5)
GFR NON-AFRICAN AMERICAN: 40
GLUCOSE BLD-MCNC: 177 MG/DL (ref 70–99)
GLUCOSE BLD-MCNC: 202 MG/DL (ref 70–99)
GLUCOSE BLD-MCNC: 232 MG/DL (ref 74–109)
GLUCOSE BLD-MCNC: 243 MG/DL (ref 70–99)
GLUCOSE BLD-MCNC: 313 MG/DL (ref 70–99)
HCT VFR BLD CALC: 39 % (ref 37–47)
HEMOGLOBIN: 12.7 G/DL (ref 12–16)
IMMATURE GRANULOCYTES #: 0.1 K/UL
LIPASE: 64 U/L (ref 13–60)
LYMPHOCYTES ABSOLUTE: 2.5 K/UL (ref 1.1–4.5)
LYMPHOCYTES RELATIVE PERCENT: 38.3 % (ref 20–40)
MCH RBC QN AUTO: 32.3 PG (ref 27–31)
MCHC RBC AUTO-ENTMCNC: 32.6 G/DL (ref 33–37)
MCV RBC AUTO: 99.2 FL (ref 81–99)
MONOCYTES ABSOLUTE: 0.5 K/UL (ref 0–0.9)
MONOCYTES RELATIVE PERCENT: 6.8 % (ref 0–10)
NEUTROPHILS ABSOLUTE: 3.5 K/UL (ref 1.5–7.5)
NEUTROPHILS RELATIVE PERCENT: 52.2 % (ref 50–65)
PDW BLD-RTO: 12.5 % (ref 11.5–14.5)
PERFORMED ON: ABNORMAL
PLATELET # BLD: 247 K/UL (ref 130–400)
PMV BLD AUTO: 9.7 FL (ref 9.4–12.3)
POTASSIUM SERPL-SCNC: 5.5 MMOL/L (ref 3.5–5)
RBC # BLD: 3.93 M/UL (ref 4.2–5.4)
SODIUM BLD-SCNC: 136 MMOL/L (ref 136–145)
TOTAL PROTEIN: 5.7 G/DL (ref 6.6–8.7)
WBC # BLD: 6.6 K/UL (ref 4.8–10.8)

## 2020-01-03 PROCEDURE — 82150 ASSAY OF AMYLASE: CPT

## 2020-01-03 PROCEDURE — 6360000002 HC RX W HCPCS: Performed by: FAMILY MEDICINE

## 2020-01-03 PROCEDURE — 96361 HYDRATE IV INFUSION ADD-ON: CPT

## 2020-01-03 PROCEDURE — 1210000000 HC MED SURG R&B

## 2020-01-03 PROCEDURE — 80053 COMPREHEN METABOLIC PANEL: CPT

## 2020-01-03 PROCEDURE — 85025 COMPLETE CBC W/AUTO DIFF WBC: CPT

## 2020-01-03 PROCEDURE — 6370000000 HC RX 637 (ALT 250 FOR IP): Performed by: FAMILY MEDICINE

## 2020-01-03 PROCEDURE — 82948 REAGENT STRIP/BLOOD GLUCOSE: CPT

## 2020-01-03 PROCEDURE — 83690 ASSAY OF LIPASE: CPT

## 2020-01-03 PROCEDURE — C9113 INJ PANTOPRAZOLE SODIUM, VIA: HCPCS | Performed by: FAMILY MEDICINE

## 2020-01-03 PROCEDURE — G0378 HOSPITAL OBSERVATION PER HR: HCPCS

## 2020-01-03 PROCEDURE — 36415 COLL VENOUS BLD VENIPUNCTURE: CPT

## 2020-01-03 PROCEDURE — 96376 TX/PRO/DX INJ SAME DRUG ADON: CPT

## 2020-01-03 PROCEDURE — 94640 AIRWAY INHALATION TREATMENT: CPT

## 2020-01-03 PROCEDURE — 6370000000 HC RX 637 (ALT 250 FOR IP): Performed by: INTERNAL MEDICINE

## 2020-01-03 PROCEDURE — 99215 OFFICE O/P EST HI 40 MIN: CPT | Performed by: INTERNAL MEDICINE

## 2020-01-03 PROCEDURE — 96372 THER/PROPH/DIAG INJ SC/IM: CPT

## 2020-01-03 PROCEDURE — 2580000003 HC RX 258: Performed by: FAMILY MEDICINE

## 2020-01-03 RX ORDER — DEXTROSE MONOHYDRATE 25 G/50ML
12.5 INJECTION, SOLUTION INTRAVENOUS PRN
Status: DISCONTINUED | OUTPATIENT
Start: 2020-01-03 | End: 2020-01-04 | Stop reason: HOSPADM

## 2020-01-03 RX ORDER — DEXTROSE MONOHYDRATE 50 MG/ML
100 INJECTION, SOLUTION INTRAVENOUS PRN
Status: DISCONTINUED | OUTPATIENT
Start: 2020-01-03 | End: 2020-01-04 | Stop reason: HOSPADM

## 2020-01-03 RX ORDER — 0.9 % SODIUM CHLORIDE 0.9 %
500 INTRAVENOUS SOLUTION INTRAVENOUS ONCE
Status: COMPLETED | OUTPATIENT
Start: 2020-01-03 | End: 2020-01-03

## 2020-01-03 RX ORDER — ENALAPRIL MALEATE 10 MG/1
5 TABLET ORAL EVERY EVENING
Status: DISCONTINUED | OUTPATIENT
Start: 2020-01-03 | End: 2020-01-04 | Stop reason: HOSPADM

## 2020-01-03 RX ORDER — NICOTINE POLACRILEX 4 MG
15 LOZENGE BUCCAL PRN
Status: DISCONTINUED | OUTPATIENT
Start: 2020-01-03 | End: 2020-01-04 | Stop reason: HOSPADM

## 2020-01-03 RX ORDER — BISACODYL 10 MG
10 SUPPOSITORY, RECTAL RECTAL DAILY
Status: DISCONTINUED | OUTPATIENT
Start: 2020-01-03 | End: 2020-01-04 | Stop reason: HOSPADM

## 2020-01-03 RX ORDER — OXYCODONE AND ACETAMINOPHEN 10; 325 MG/1; MG/1
1 TABLET ORAL EVERY 4 HOURS PRN
Status: DISCONTINUED | OUTPATIENT
Start: 2020-01-03 | End: 2020-01-04

## 2020-01-03 RX ORDER — POLYETHYLENE GLYCOL 3350 17 G/17G
155 POWDER, FOR SOLUTION ORAL ONCE
Status: COMPLETED | OUTPATIENT
Start: 2020-01-03 | End: 2020-01-03

## 2020-01-03 RX ADMIN — SODIUM CHLORIDE, PRESERVATIVE FREE 10 ML: 5 INJECTION INTRAVENOUS at 11:02

## 2020-01-03 RX ADMIN — METOPROLOL TARTRATE 25 MG: 25 TABLET ORAL at 09:07

## 2020-01-03 RX ADMIN — OXYCODONE HYDROCHLORIDE AND ACETAMINOPHEN 1 TABLET: 10; 325 TABLET ORAL at 20:00

## 2020-01-03 RX ADMIN — METOPROLOL TARTRATE 25 MG: 25 TABLET ORAL at 20:00

## 2020-01-03 RX ADMIN — BISACODYL 10 MG: 10 SUPPOSITORY RECTAL at 14:26

## 2020-01-03 RX ADMIN — POLYETHYLENE GLYCOL 3350 155 G: 17 POWDER, FOR SOLUTION ORAL at 14:26

## 2020-01-03 RX ADMIN — BUDESONIDE 250 MCG: 0.25 INHALANT RESPIRATORY (INHALATION) at 19:19

## 2020-01-03 RX ADMIN — BISACODYL 10 MG: 5 TABLET, COATED ORAL at 17:24

## 2020-01-03 RX ADMIN — INSULIN LISPRO 4 UNITS: 100 INJECTION, SOLUTION INTRAVENOUS; SUBCUTANEOUS at 17:26

## 2020-01-03 RX ADMIN — SODIUM CHLORIDE: 9 INJECTION, SOLUTION INTRAVENOUS at 01:53

## 2020-01-03 RX ADMIN — SODIUM CHLORIDE 500 ML: 9 INJECTION, SOLUTION INTRAVENOUS at 10:11

## 2020-01-03 RX ADMIN — BUDESONIDE 500 MCG: 0.25 INHALANT RESPIRATORY (INHALATION) at 06:44

## 2020-01-03 RX ADMIN — METHYLNALTREXONE BROMIDE 12 MG: 12 INJECTION, SOLUTION SUBCUTANEOUS at 20:00

## 2020-01-03 RX ADMIN — SODIUM CHLORIDE: 9 INJECTION, SOLUTION INTRAVENOUS at 19:59

## 2020-01-03 RX ADMIN — NIFEDIPINE 30 MG: 30 TABLET, FILM COATED, EXTENDED RELEASE ORAL at 09:07

## 2020-01-03 RX ADMIN — PANTOPRAZOLE SODIUM 40 MG: 40 INJECTION, POWDER, LYOPHILIZED, FOR SOLUTION INTRAVENOUS at 09:07

## 2020-01-03 RX ADMIN — ENOXAPARIN SODIUM 40 MG: 40 INJECTION SUBCUTANEOUS at 09:06

## 2020-01-03 RX ADMIN — ALBUTEROL SULFATE 2.5 MG: 2.5 SOLUTION RESPIRATORY (INHALATION) at 11:02

## 2020-01-03 RX ADMIN — DULOXETINE HYDROCHLORIDE 60 MG: 60 CAPSULE, DELAYED RELEASE ORAL at 09:07

## 2020-01-03 RX ADMIN — ALBUTEROL SULFATE 2.5 MG: 2.5 SOLUTION RESPIRATORY (INHALATION) at 19:18

## 2020-01-03 RX ADMIN — INSULIN LISPRO 8 UNITS: 100 INJECTION, SOLUTION INTRAVENOUS; SUBCUTANEOUS at 09:49

## 2020-01-03 RX ADMIN — ALBUTEROL SULFATE 2.5 MG: 2.5 SOLUTION RESPIRATORY (INHALATION) at 15:05

## 2020-01-03 RX ADMIN — ENALAPRIL MALEATE 5 MG: 10 TABLET ORAL at 17:25

## 2020-01-03 RX ADMIN — INSULIN LISPRO 4 UNITS: 100 INJECTION, SOLUTION INTRAVENOUS; SUBCUTANEOUS at 12:17

## 2020-01-03 RX ADMIN — Medication 10 ML: at 09:07

## 2020-01-03 RX ADMIN — SODIUM CHLORIDE: 9 INJECTION, SOLUTION INTRAVENOUS at 12:44

## 2020-01-03 RX ADMIN — ALBUTEROL SULFATE 2.5 MG: 2.5 SOLUTION RESPIRATORY (INHALATION) at 06:43

## 2020-01-03 ASSESSMENT — PAIN SCALES - GENERAL
PAINLEVEL_OUTOF10: 6
PAINLEVEL_OUTOF10: 3

## 2020-01-03 NOTE — H&P
AMMY [employer] OF [facility] DAIJA [address], [address] [facility]                              HISTORY AND PHYSICAL    PATIENT NAME: Edgard FRAIRE               :        1950  MED REC NO:   [id number]                              ROOM:       [facility]  ACCOUNT NO:   [de-identified]                           ADMIT DATE: 2020  PROVIDER:     Fredy Bethea MD    HISTORY OF PRESENT ILLNESS:  The patient admitted with weeks' long  history of abdominal pain, worsening each day with decreased p.o.  intake; nausea but no vomiting. She had a lot of problems with  constipation. She had not had a bowel movement since the . It was  a small bowel movement that she had to strain to get out. She has had  history of pancreatitis and was worried she might have pancreatic  inflammation. She has not changed to diet she states other than she has  not been eating very much in the last week to slightly more than a week. She has had no fever. No melena, hematochezia, or hematemesis. PAST MEDICAL HISTORY:  Her past medical history is for previous problems  with pancreatitis. She has had chronic back pain. She has diabetes  type 2 with neuropathy. She has a history of DVT; history of  hypertension, hyperlipidemia, arthritis, and sleep apnea. PAST SURGICAL HISTORY:  She has had prior vein surgery. She has had  upper and lower endoscopies. She has had prior neck surgery, prior ENT  surgery on her nose, prior bilateral knee replacements, a total  hysterectomy with both ovaries removed. She has had ERCP in the past,  in 2009; cholecystectomy; carpal tunnel release; previous back  surgery and an appendectomy. FAMILY HISTORY:  Positive for heart disease, liver cancer, high blood  pressure, and high cholesterol. SOCIAL HISTORY:  She continues to be an everyday smoker of a pack of  cigarettes a day. She does not use alcohol. She is not .   She  lives alone.    MEDICATIONS ON ADMISSION:  Zanaflex 4 t.i.d. p.r.n., clonidine 0.1 daily  as needed for systolic greater than 454 or diastolic greater than 90,  nifedipine CC 30 mg daily, glimepiride 2 mg daily, Percocet 10 b.i.d.  p.r.n., metformin 1000 b.i.d., bisoprolol 5 mg daily, atorvastatin 20 mg  daily, duloxetine 60 mg daily, buspirone 15 mg b.i.d. p.r.n., enalapril  5 mg daily, Voltaren ophthalmic drops one drop to each eye four times  daily, and Advair 250/50 one puff twice daily. ALLERGIES:  She is allergic to ACE INHIBITORS, PENICILLIN, CODEINE,  HCTZ, and IRBESARTAN. PHYSICAL EXAMINATION:  VITAL SIGNS:  Blood pressure is 148/84, respirations 20, pulse 63,  temperature 97, and O2 sats 91% on room air. HEENT:  Normocephalic, atraumatic. She looks ill. CHEST:  Clear. HEART:  Regular. ABDOMEN:  Tender in the mid epigastrium and lower abdominal area as  well. She has no guarding, rebound, mass, or hepatosplenomegaly. EXTREMITIES:  No clubbing, cyanosis, or edema. LABORATORY DATA:  Sodium 129 last night, this morning it is 136;  potassium 5.5; chloride is 98; CO2 is 27; BUN is 28, creatinine is 1.3;  GFR was 44 last night and 40 this morning. Glucose, last night 322 and  this morning is 232; calcium is 8.5. LFTs; ALT is 55, AST is 54,  amylase 28, lipase 64, total protein 5.7, bilirubin less than 0.2. White count 6.6, hemoglobin 12.7, and platelets 452,189. Urine looked  okay. ABG; pH 7.43, PCO2 is 30, PO2 is 92. CT of the abdomen, mild  haziness at the pancreatic or duodenal groove which could be seen with  pancreatitis or duodenitis. Common bile duct is 1 cm with mild  intrahepatic bile duct dilation, this has increased since 12/04/2018;  could be a reservoir effect from cholecystectomy. There is stable  subcentimeter liver lesion compared to 2018, looks benign as it has not  changed in over a year.   She has had a cholecystectomy and hysterectomy,  and she has a large amount of stool in

## 2020-01-03 NOTE — CONSULTS
elevated creatinine 1.2, BUN 26, and initial normal liver  function tests. Serum amylase is normal at 28 and serum lipase 60. Repeat serum lipase has had a slight bump up to 64, which is not thought  to be clinically relevant at this time and can be monitored further as  an outpatient. PAST MEDICAL HISTORY:  Hypertension, hyperlipidemia, type 2 diabetes  mellitus, chronic back pain, osteoarthritis, pancreatitis, prior DVT. SURGICAL HISTORY:  Cholecystectomy, subsequent ERCP with stent at  [place] and then prior colonoscopy for colon polyps. CURRENT MAINTENANCE MEDICATIONS:  See admission medication  reconciliation. SOCIAL HISTORY:  No habits of alcohol or tobacco.    FAMILY HISTORY:  Noncontributory. PHYSICAL EXAMINATION:  GENERAL:  A well-nourished female, able to sit up in bed, stand and  ambulate to the bathroom on her own, no pallor of complexion, afebrile. Under treatment for hypertension. VITAL SIGNS:  Normal.  HEENT:  Sclerae white. Conjunctivae pink. Buccal mucosa moist.  NECK:  No thyromegaly. LUNGS:  Clear to auscultation and percussion. CARDIOVASCULAR:  No cardiac gallop or murmur. ABDOMEN:  Mild abdominal fullness. No localizing abdominal tenderness. Bowel sounds maintained. No hepatosplenomegaly palpable. Gloved rectal  exam revealed no rectal impaction of stool, brownish yellow stool, heme  negative. After the patient has relief of her constipation, then she can be  discharged and arrange for follow up in the GI clinic within 1 month.         Jessika Bhat MD    D: 01/03/2020 11:42:53      T: 01/03/2020 12:55:54     [id number]  Job#: [id number]     Doc#: [id number]    CC:

## 2020-01-04 VITALS
WEIGHT: 165 LBS | HEIGHT: 63 IN | RESPIRATION RATE: 20 BRPM | BODY MASS INDEX: 29.23 KG/M2 | OXYGEN SATURATION: 100 % | HEART RATE: 65 BPM | SYSTOLIC BLOOD PRESSURE: 135 MMHG | DIASTOLIC BLOOD PRESSURE: 80 MMHG | TEMPERATURE: 97.4 F

## 2020-01-04 LAB
ALBUMIN SERPL-MCNC: 3.1 G/DL (ref 3.5–5.2)
ALP BLD-CCNC: 70 U/L (ref 35–104)
ALT SERPL-CCNC: 39 U/L (ref 5–33)
ANION GAP SERPL CALCULATED.3IONS-SCNC: 14 MMOL/L (ref 7–19)
AST SERPL-CCNC: 22 U/L (ref 5–32)
BILIRUB SERPL-MCNC: <0.2 MG/DL (ref 0.2–1.2)
BUN BLDV-MCNC: 16 MG/DL (ref 8–23)
CALCIUM SERPL-MCNC: 8.1 MG/DL (ref 8.8–10.2)
CHLORIDE BLD-SCNC: 104 MMOL/L (ref 98–111)
CO2: 20 MMOL/L (ref 22–29)
CREAT SERPL-MCNC: 0.9 MG/DL (ref 0.5–0.9)
GFR NON-AFRICAN AMERICAN: >60
GLUCOSE BLD-MCNC: 178 MG/DL (ref 74–109)
GLUCOSE BLD-MCNC: 183 MG/DL (ref 70–99)
GLUCOSE BLD-MCNC: 218 MG/DL (ref 70–99)
LIPASE: 39 U/L (ref 13–60)
PERFORMED ON: ABNORMAL
PERFORMED ON: ABNORMAL
POTASSIUM SERPL-SCNC: 3.8 MMOL/L (ref 3.5–5)
SODIUM BLD-SCNC: 138 MMOL/L (ref 136–145)
TOTAL PROTEIN: 5.2 G/DL (ref 6.6–8.7)

## 2020-01-04 PROCEDURE — 96372 THER/PROPH/DIAG INJ SC/IM: CPT

## 2020-01-04 PROCEDURE — 6360000002 HC RX W HCPCS: Performed by: FAMILY MEDICINE

## 2020-01-04 PROCEDURE — 94640 AIRWAY INHALATION TREATMENT: CPT

## 2020-01-04 PROCEDURE — 83690 ASSAY OF LIPASE: CPT

## 2020-01-04 PROCEDURE — G0378 HOSPITAL OBSERVATION PER HR: HCPCS

## 2020-01-04 PROCEDURE — 82948 REAGENT STRIP/BLOOD GLUCOSE: CPT

## 2020-01-04 PROCEDURE — 80053 COMPREHEN METABOLIC PANEL: CPT

## 2020-01-04 PROCEDURE — 6370000000 HC RX 637 (ALT 250 FOR IP): Performed by: FAMILY MEDICINE

## 2020-01-04 PROCEDURE — 99213 OFFICE O/P EST LOW 20 MIN: CPT | Performed by: INTERNAL MEDICINE

## 2020-01-04 PROCEDURE — C9113 INJ PANTOPRAZOLE SODIUM, VIA: HCPCS | Performed by: FAMILY MEDICINE

## 2020-01-04 PROCEDURE — 2580000003 HC RX 258: Performed by: FAMILY MEDICINE

## 2020-01-04 PROCEDURE — 36415 COLL VENOUS BLD VENIPUNCTURE: CPT

## 2020-01-04 PROCEDURE — 96376 TX/PRO/DX INJ SAME DRUG ADON: CPT

## 2020-01-04 PROCEDURE — 96361 HYDRATE IV INFUSION ADD-ON: CPT

## 2020-01-04 RX ORDER — FAMOTIDINE 20 MG/1
20 TABLET, FILM COATED ORAL 2 TIMES DAILY
Status: DISCONTINUED | OUTPATIENT
Start: 2020-01-04 | End: 2020-01-04 | Stop reason: HOSPADM

## 2020-01-04 RX ORDER — OXYCODONE AND ACETAMINOPHEN 7.5; 325 MG/1; MG/1
1 TABLET ORAL 2 TIMES DAILY PRN
Status: DISCONTINUED | OUTPATIENT
Start: 2020-01-04 | End: 2020-01-04 | Stop reason: HOSPADM

## 2020-01-04 RX ORDER — OXYCODONE AND ACETAMINOPHEN 10; 325 MG/1; MG/1
1 TABLET ORAL 4 TIMES DAILY PRN
Status: DISCONTINUED | OUTPATIENT
Start: 2020-01-04 | End: 2020-01-04

## 2020-01-04 RX ORDER — POLYETHYLENE GLYCOL 3350 17 G/17G
17 POWDER, FOR SOLUTION ORAL DAILY
Qty: 1530 G | Refills: 5 | Status: SHIPPED | OUTPATIENT
Start: 2020-01-04 | End: 2020-02-03

## 2020-01-04 RX ORDER — OXYCODONE AND ACETAMINOPHEN 7.5; 325 MG/1; MG/1
1 TABLET ORAL 4 TIMES DAILY PRN
Status: DISCONTINUED | OUTPATIENT
Start: 2020-01-04 | End: 2020-01-04

## 2020-01-04 RX ORDER — PANTOPRAZOLE SODIUM 40 MG/1
40 TABLET, DELAYED RELEASE ORAL
Qty: 30 TABLET | Refills: 2 | Status: SHIPPED | OUTPATIENT
Start: 2020-01-04 | End: 2020-02-03 | Stop reason: SDUPTHER

## 2020-01-04 RX ADMIN — INSULIN LISPRO 2 UNITS: 100 INJECTION, SOLUTION INTRAVENOUS; SUBCUTANEOUS at 11:02

## 2020-01-04 RX ADMIN — NIFEDIPINE 30 MG: 30 TABLET, FILM COATED, EXTENDED RELEASE ORAL at 08:02

## 2020-01-04 RX ADMIN — ALBUTEROL SULFATE 2.5 MG: 2.5 SOLUTION RESPIRATORY (INHALATION) at 09:56

## 2020-01-04 RX ADMIN — PANTOPRAZOLE SODIUM 40 MG: 40 INJECTION, POWDER, LYOPHILIZED, FOR SOLUTION INTRAVENOUS at 08:02

## 2020-01-04 RX ADMIN — ALBUTEROL SULFATE 2.5 MG: 2.5 SOLUTION RESPIRATORY (INHALATION) at 14:08

## 2020-01-04 RX ADMIN — Medication 10 ML: at 08:07

## 2020-01-04 RX ADMIN — BUDESONIDE 250 MCG: 0.25 INHALANT RESPIRATORY (INHALATION) at 06:12

## 2020-01-04 RX ADMIN — ENOXAPARIN SODIUM 40 MG: 40 INJECTION SUBCUTANEOUS at 08:02

## 2020-01-04 RX ADMIN — SODIUM CHLORIDE, PRESERVATIVE FREE 10 ML: 5 INJECTION INTRAVENOUS at 08:03

## 2020-01-04 RX ADMIN — ALBUTEROL SULFATE 2.5 MG: 2.5 SOLUTION RESPIRATORY (INHALATION) at 06:11

## 2020-01-04 RX ADMIN — DULOXETINE HYDROCHLORIDE 60 MG: 60 CAPSULE, DELAYED RELEASE ORAL at 08:02

## 2020-01-04 RX ADMIN — METOPROLOL TARTRATE 25 MG: 25 TABLET ORAL at 08:03

## 2020-01-04 RX ADMIN — INSULIN LISPRO 4 UNITS: 100 INJECTION, SOLUTION INTRAVENOUS; SUBCUTANEOUS at 08:02

## 2020-01-04 NOTE — PROGRESS NOTES
Opioid constipation relieved. Abdomen soft. Labs okay. No further inpatient work-up. Discharge home on daily miralax. Follow-up in outpatient GI Clinic in 2 months concerning possible occult gallstone pancreatitis.

## 2020-01-04 NOTE — PROGRESS NOTES
Pt ate well for lunch and tolerated the full diet. Dr. Yesica Corral paged to notify and receive orders. Awaiting call back.

## 2020-01-04 NOTE — PROGRESS NOTES
Progress Note  1/4/2020 8:45 AM  Subjective:   Admit Date: 1/2/2020  PCP: Derick Puentes MD    Interval History: She had multiple BMs and she is feeling better. DIET CLEAR LIQUID; Intake/Output Summary (Last 24 hours) at 1/4/2020 0845  Last data filed at 1/4/2020 7810  Gross per 24 hour   Intake 5196 ml   Output --   Net 5196 ml     Medications:      dextrose      sodium chloride 125 mL/hr at 01/03/20 1959      insulin lispro  0-12 Units Subcutaneous TID WC    insulin lispro  0-6 Units Subcutaneous Nightly    enalapril  5 mg Oral QPM    bisacodyl  10 mg Rectal Daily    pantoprazole  40 mg Intravenous Daily    And    sodium chloride (PF)  10 mL Intravenous Daily    sodium chloride flush  10 mL Intravenous 2 times per day    enoxaparin  40 mg Subcutaneous Daily    metoprolol tartrate  25 mg Oral BID    diclofenac  1 drop Both Eyes 4x Daily    DULoxetine  60 mg Oral Daily    NIFEdipine  30 mg Oral Daily    budesonide  0.5 mg Nebulization BID    And    albuterol  2.5 mg Nebulization 4x daily     Recent Labs     01/02/20  0948 01/03/20 0427   WBC 8.0 6.6   HGB 15.6 12.7    247     Recent Labs     01/02/20  1226 01/02/20  1232 01/03/20  0427 01/04/20  0451   *  --  136 138   K 3.6 3.4 5.5* 3.8   CL 94*  --  98 104   CO2 18*  --  27 20*   BUN 26*  --  28* 16   CREATININE 1.2*  --  1.3* 0.9   GLUCOSE 322*  --  232* 178*     Recent Labs     01/02/20  1226 01/03/20  0427 01/04/20  0451   AST 14 54* 22   ALT 21 55* 39*   BILITOT 0.3 <0.2 <0.2   ALKPHOS 71 74 70       Objective:   Vitals: /80   Pulse 65   Temp 97.4 °F (36.3 °C) (Temporal)   Resp 20   Ht 5' 3\" (1.6 m)   Wt 165 lb (74.8 kg)   SpO2 100%   BMI 29.23 kg/m²   General appearance: alert and cooperative with exam  Lungs: clear to auscultation bilaterally  Heart: regular rate and rhythm, S1, S2 normal, no murmur, click, rub or gallop  Abdomen: Some BS and decreased tenderness.   Extremities: extremities normal,

## 2020-01-04 NOTE — PLAN OF CARE
Problem: Falls - Risk of:  Goal: Will remain free from falls  Description  Will remain free from falls  1/4/2020 0552 by Raisa Medrano LPN  Outcome: Ongoing  1/3/2020 1622 by Elizabeth Munoz RN  Outcome: Ongoing  Goal: Absence of physical injury  Description  Absence of physical injury  1/4/2020 0552 by Raisa Medrano LPN  Outcome: Ongoing  1/3/2020 1622 by Elizabeth Munoz RN  Outcome: Ongoing     Problem:  Bowel/Gastric:  Goal: Ability to achieve a regular elimination pattern will improve  Description  Ability to achieve a regular elimination pattern will improve  1/4/2020 0552 by Raisa Medrano LPN  Outcome: Ongoing  1/3/2020 1622 by Elizabeth Munoz RN  Outcome: Ongoing  Goal: Occurrences of constipation will decrease  Description  Occurrences of constipation will decrease  1/4/2020 0552 by Raisa Medrano LPN  Outcome: Ongoing  1/3/2020 1622 by Elizabeth Munoz RN  Outcome: Ongoing     Problem: Nutritional:  Goal: Ability to follow a diet with enough fiber (20 to 30 grams) for normal bowel function will improve  Description  Ability to follow a diet with enough fiber (20 to 30 grams) for normal bowel function will improve  1/4/2020 0552 by Raisa Medrano LPN  Outcome: Ongoing  1/3/2020 1622 by Elizabeth Munoz RN  Outcome: Ongoing

## 2020-01-07 NOTE — DISCHARGE SUMMARY
AMMY [facility] OF [facility] DAIJA [address], [address] [facility]                               DISCHARGE SUMMARY    PATIENT NAME: Elva Augustine               :        1950  MED REC NO:   [id number]                              ROOM:       [facility]  ACCOUNT NO:   [de-identified]                           ADMIT DATE: 2020  PROVIDER:     Samira Santos MD                   [facility] DATE: 2020    DIAGNOSES:  1. Opioid-induced constipation, which was severe and caused abdominal  pain. 2.  History of recurrent pancreatitis but no evidence of pancreatitis  during this admission. 3.  Hyponatremia due to poor p.o. intake and poor nutrition due to the  constipation. 4.  Acute kidney injury due to poor p.o. intake from the constipation. 5.  Diabetes. 6.  Hypertension. 7.  Chronic pain. HOSPITAL COURSE:  She was seen in consultation by GI, Dr. Darwin Dixon, who  ordered Relistor and MiraLax, which worked very well for the  constipation. I have talked to her about the use of the MiraLax and use  of the pain medicine. She states that most days she takes 1 or 2 pain  medicines and occasionally, she will take a third depending on how much  pain she is having. I have told her she would be best to try to get by  with much less if she can. I will talk about that again more as an  outpatient. She has improved. She has eaten a regular lunch and she  feels ready to be discharged home. Her vitals were stable. Her lab  work was stable with her sodium improved from admission when it was low  up to 138. Her other electrolytes were normal.  Kidney functions were  normal.  During this admission, she had a CT of the abdomen and pelvis,  which showed possible duodenitis, possible pancreatitis. Her common  bile duct was slightly increased in size when compared to the 2018  scan they did. She has had previous ERCP. She has had cholecystectomy  as well.   This may need to

## 2020-02-03 ENCOUNTER — OFFICE VISIT (OUTPATIENT)
Dept: GASTROENTEROLOGY | Age: 70
End: 2020-02-03
Payer: MEDICARE

## 2020-02-03 VITALS
HEIGHT: 64 IN | SYSTOLIC BLOOD PRESSURE: 122 MMHG | DIASTOLIC BLOOD PRESSURE: 82 MMHG | HEART RATE: 78 BPM | OXYGEN SATURATION: 98 % | BODY MASS INDEX: 30.29 KG/M2 | WEIGHT: 177.4 LBS

## 2020-02-03 PROCEDURE — 4040F PNEUMOC VAC/ADMIN/RCVD: CPT | Performed by: NURSE PRACTITIONER

## 2020-02-03 PROCEDURE — 1111F DSCHRG MED/CURRENT MED MERGE: CPT | Performed by: NURSE PRACTITIONER

## 2020-02-03 PROCEDURE — G8417 CALC BMI ABV UP PARAM F/U: HCPCS | Performed by: NURSE PRACTITIONER

## 2020-02-03 PROCEDURE — G8427 DOCREV CUR MEDS BY ELIG CLIN: HCPCS | Performed by: NURSE PRACTITIONER

## 2020-02-03 PROCEDURE — 1090F PRES/ABSN URINE INCON ASSESS: CPT | Performed by: NURSE PRACTITIONER

## 2020-02-03 PROCEDURE — 1123F ACP DISCUSS/DSCN MKR DOCD: CPT | Performed by: NURSE PRACTITIONER

## 2020-02-03 PROCEDURE — 4004F PT TOBACCO SCREEN RCVD TLK: CPT | Performed by: NURSE PRACTITIONER

## 2020-02-03 PROCEDURE — G8400 PT W/DXA NO RESULTS DOC: HCPCS | Performed by: NURSE PRACTITIONER

## 2020-02-03 PROCEDURE — 3017F COLORECTAL CA SCREEN DOC REV: CPT | Performed by: NURSE PRACTITIONER

## 2020-02-03 PROCEDURE — G8484 FLU IMMUNIZE NO ADMIN: HCPCS | Performed by: NURSE PRACTITIONER

## 2020-02-03 PROCEDURE — 99214 OFFICE O/P EST MOD 30 MIN: CPT | Performed by: NURSE PRACTITIONER

## 2020-02-03 RX ORDER — LANOLIN ALCOHOL/MO/W.PET/CERES
1000 CREAM (GRAM) TOPICAL DAILY
COMMUNITY
End: 2020-09-30

## 2020-02-03 RX ORDER — PANTOPRAZOLE SODIUM 40 MG/1
40 TABLET, DELAYED RELEASE ORAL
Qty: 30 TABLET | Refills: 5 | Status: SHIPPED | OUTPATIENT
Start: 2020-02-03 | End: 2020-07-28

## 2020-02-03 RX ORDER — ONDANSETRON 4 MG/1
TABLET, FILM COATED ORAL
Qty: 15 TABLET | Refills: 0 | Status: SHIPPED | OUTPATIENT
Start: 2020-02-03 | End: 2021-05-06

## 2020-02-03 ASSESSMENT — ENCOUNTER SYMPTOMS
SORE THROAT: 0
CONSTIPATION: 1
BLOOD IN STOOL: 0
RECTAL PAIN: 0
ABDOMINAL PAIN: 0
ABDOMINAL DISTENTION: 0
COUGH: 0
CHEST TIGHTNESS: 0
BACK PAIN: 1
VOMITING: 0
SHORTNESS OF BREATH: 0
DIARRHEA: 0
VOICE CHANGE: 0
NAUSEA: 0

## 2020-02-03 NOTE — PROGRESS NOTES
Subjective:      Patient ID: Meche Avendano is a 68 y.o. female  Morris Holcomb MD  No ref. provider found    HPI  Chief Complaint   Patient presents with   [address] from Hospital    Constipation    Gastroesophageal Reflux       Patient seen in f/u from hospital admission for OIC. Last office visit over 3 yrs ago. GI consult was obtained. CONSULT DATE:  01/03/2020     GI CONSULTATION     ASSESSMENT:  1. Opioid-induced constipation, long-term while maintained on Lortab  originally and now Percocet. 2.  Incidental history of prior removal of colon polyps approximately 8  years ago per patient history. 3.  Epigastric pain attributed to duodenitis with a history of prior  NSAID use. 4.  Remote cholecystectomy approximately 20 years ago and then  subsequent treatment for possible gallstone pancreatitis in [place],  where she underwent ERCP and common bile duct balloon sweep with  subsequent stent, query possible recurrence of common bile duct stone. Labs and imaging from admission were reviewed. Lipase mildly elevated one day, 64. Otherwise was normal.   Liver enzymes have occasionally been mildly elevated. Alk phos remained normal.   A1c elevated but has improved. 7.8    CT scan abd/pelvis:   Impression 1. Mild haziness at the pancreaticoduodenal groove, which could be seen with pancreatitis or duodenitis. Correlate with symptoms and laboratory studies. 2. Common bile duct measures up to 1 cm with mild intrahepatic bile duct dilation. This is increased compared to 12/4/2018. This can be seen as reservoir effect after cholecystectomy. Recommend correlation with laboratory studies. 3. Stable subcentimeter liver lesion compared to 2018. Stability favors benignity. 4. Cholecystectomy. Hysterectomy. 5. Large amount of colonic stool. Signed by Dr Kiana Gao on 1/2/2020 12:22 PM     She was treated with Relistor and miralax. Symptoms improved and bowels were moving at discharge.      She says

## 2020-02-21 ENCOUNTER — TELEPHONE (OUTPATIENT)
Dept: GASTROENTEROLOGY | Age: 70
End: 2020-02-21

## 2020-02-21 NOTE — TELEPHONE ENCOUNTER
Emre,    Pt called today to cancel her procedure with Dr Callum Ag on 2/27/20 due to shingles. She will call back once she is better. I made a note in patient messages and notified patient we got the message. If you can do the process of actually cancelling it.  Thanks

## 2020-07-28 RX ORDER — PANTOPRAZOLE SODIUM 40 MG/1
TABLET, DELAYED RELEASE ORAL
Qty: 30 TABLET | Refills: 5 | Status: SHIPPED | OUTPATIENT
Start: 2020-07-28 | End: 2021-05-06

## 2020-09-11 ENCOUNTER — TRANSCRIBE ORDERS (OUTPATIENT)
Dept: ADMINISTRATIVE | Facility: HOSPITAL | Age: 70
End: 2020-09-11

## 2020-09-11 DIAGNOSIS — Z01.818 PRE-OP TESTING: Primary | ICD-10-CM

## 2020-09-14 ENCOUNTER — LAB (OUTPATIENT)
Dept: LAB | Facility: HOSPITAL | Age: 70
End: 2020-09-14

## 2020-09-14 PROCEDURE — C9803 HOPD COVID-19 SPEC COLLECT: HCPCS | Performed by: OTOLARYNGOLOGY

## 2020-09-14 PROCEDURE — U0003 INFECTIOUS AGENT DETECTION BY NUCLEIC ACID (DNA OR RNA); SEVERE ACUTE RESPIRATORY SYNDROME CORONAVIRUS 2 (SARS-COV-2) (CORONAVIRUS DISEASE [COVID-19]), AMPLIFIED PROBE TECHNIQUE, MAKING USE OF HIGH THROUGHPUT TECHNOLOGIES AS DESCRIBED BY CMS-2020-01-R: HCPCS | Performed by: OTOLARYNGOLOGY

## 2020-09-15 LAB
COVID LABCORP PRIORITY: NORMAL
SARS-COV-2 RNA RESP QL NAA+PROBE: NOT DETECTED

## 2020-09-16 NOTE — PROGRESS NOTES
YOB: 1950  Location: [facility]  Location Address: [address] [place] [address], [facility], [address] [place] [address]  Location Phone: [phone]    Chief Complaint   Patient presents with   • Follow-up       History of Present Illness  Blanca Martin is a 68 y.o. female.  Blanca Martin is here for follow-up of ENT complaints. The patient has had problems with tongue sores.  The symptoms are localized to the left> right  ventral tongue. The patient has had stable symptoms. She denies dysphagia, hoarseness or issues with eating and drinking.  The symptoms have been present for the last year. The symptoms are aggravated by  no identifiable factors. The symptoms are improved by medications.   Patient has a history of tobacco use for the last 46 years.    The lesions are stable.    I have personally reviewed the information imported into the chart during this visit.      I have personally reviewed the review of systems.                         Past Medical History:   Diagnosis Date   • Arthritis    • Chronic back pain    • Chronic kidney disease     stage 2   • Depression    • Diabetes mellitus (CMS/HCC)    • H/O blood clots    • Hyperlipidemia    • Hypertension    • PONV (postoperative nausea and vomiting)    • Spinal headache    • Vascular disease        Past Surgical History:   Procedure Laterality Date   • ANTERIOR CERVICAL DISCECTOMY W/ FUSION N/A 2018    Procedure: ANTERIOR CERVICAL DISCECTOMY FUSION C4-5, C6-7 WITH INSTRUMENTATION C4-7;  Surgeon: JAXSON Rm MD;  Location: [facility];  Service: Orthopedic Spine   • APPENDECTOMY     • BACK SURGERY     • CARDIAC CATHETERIZATION     • CARPAL TUNNEL RELEASE     • CERVICAL FUSION      C5-6   •  SECTION     • CHOLECYSTECTOMY     • GANGLION CYST EXCISION Right    • HERNIA REPAIR      VENTRAL    • HYSTERECTOMY     • JOINT REPLACEMENT Bilateral 2012   • KNEE SURGERY     • LEG SURGERY      blood clot removed   • LUMBAR  FUSION  2010 2015    L2-5/L5S1   • NASAL SEPTUM SURGERY  2005   • NECK SURGERY     • RADIOFREQUENCY ABLATION Right 2014   • SHOULDER SURGERY     • TONSILLECTOMY     • TRIGGER FINGER RELEASE Left 2009   • VEIN LIGATION AND STRIPPING Left        Outpatient Medications Marked as Taking for the 9/17/20 encounter (Office Visit) with Peter Navarro MD   Medication Sig Dispense Refill   • atorvastatin (LIPITOR) 20 MG tablet Take  by mouth.     • bisoprolol (ZEBeta) 5 MG tablet Take 5 mg by mouth Daily.     • busPIRone (BUSPAR) 15 MG tablet Take 15 mg by mouth Daily As Needed (AS NEEDED FOR ANXIETY).     • CloNIDine (CATAPRES) 0.1 MG tablet Take 0.1 mg by mouth Daily As Needed for High Blood Pressure (SYSOVER 160 COREEN. OVER 120).     • diclofenac (VOLTAREN) 75 MG EC tablet      • DULoxetine (CYMBALTA) 60 MG capsule Take 60 mg by mouth Daily.     • fluticasone (FLONASE) 50 MCG/ACT nasal spray 2 sprays into the nostril(s) as directed by provider Daily. 16 g 11   • glimepiride (AMARYL) 2 MG tablet Take 2 mg by mouth Daily As Needed (IF BS OVER 150).     • metFORMIN (GLUCOPHAGE) 1000 MG tablet Take 1,000 mg by mouth 2 (Two) Times a Day.     • omeprazole (priLOSEC) 40 MG capsule Take 40 mg by mouth As Needed.     • oxyCODONE-acetaminophen (PERCOCET) 5-325 MG per tablet      • tiZANidine (ZANAFLEX) 4 MG tablet Take 4 mg by mouth Every 8 (Eight) Hours As Needed for Muscle Spasms.         Codeine, Amoxicillin-pot clavulanate, Ace inhibitors, Advair diskus [fluticasone-salmeterol], Ascorbate, Avapro [irbesartan], Hydrochlorothiazide, Irbesartan-hydrochlorothiazide, Simvastatin, and Thiazide-type diuretics    Family History   Problem Relation Age of Onset   • Cancer Father    • Lung cancer Mother    • Heart disease Mother        Social History     Socioeconomic History   • Marital status:      Spouse name: Not on file   • Number of children: Not on file   • Years of education: Not on file   • Highest education level: Not  on file   Tobacco Use   • Smoking status: Current Every Day Smoker     Packs/day: 1.00     Years: 46.00     Pack years: 46.00     Types: Cigarettes   • Smokeless tobacco: Never Used   Substance and Sexual Activity   • Alcohol use: No   • Drug use: Defer   • Sexual activity: Defer       Review of Systems   Constitutional: Negative.    HENT:        Tongue lesion   Eyes: Negative.    Respiratory: Negative.    Cardiovascular: Negative.    Gastrointestinal: Negative.    Endocrine: Negative.    Genitourinary: Negative.    Musculoskeletal: Negative.    Skin: Negative.    Allergic/Immunologic: Negative.    Neurological: Negative.    Hematological: Negative.    Psychiatric/Behavioral: Negative.        Vitals:    09/17/20 1358   BP: 118/60   Pulse: 79   Temp: 97.5 °F (36.4 °C)       Body mass index is 32.77 kg/m².    Objective     Physical Exam CONSTITUTIONAL: well nourished, well-developed, alert, oriented, in no acute distress     COMMUNICATION AND VOICE: able to communicate normally, normal voice quality    HEAD: normocephalic, no lesions, atraumatic, no tenderness, no masses     FACE: appearance normal, no lesions, no tenderness, no deformities, facial motion symmetric    EYES: ocular motility normal, eyelids normal, orbits normal, no proptosis, conjunctiva normal , pupils equal, round     EARS:  Hearing: hearing to conversational voice intact bilaterally   External Ears: normal bilaterally, no lesions  TMs  AS-clear and intact TM  AD-clear and intact TM    NOSE:  External Nose: external nasal structure normal, no tenderness on palpation, no nasal discharge, no lesions, no evidence of trauma, nostrils patent     ORAL:  Lips: upper and lower lips without lesion   OC/OP- 5 x 7 mm area of superficial leukoplakia on either side of the lingual frenulum on the anterior floor of mouth.  No tenderness or erythema noted bilaterally.  NO change from previous eval 1 year ago    NECK:  Inspection and Palpation: neck appearance  normal, no masses or tenderness    CHEST/RESPIRATORY: normal respiratory effort     CARDIOVASCULAR: no cyanosis or edema     NEUROLOGICAL/PSYCHIATRIC: oriented to time, place and person, mood normal, affect appropriate, CN II-XII intact grossly      Assessment/Plan   Blanca was seen today for follow-up.    Diagnoses and all orders for this visit:    Oral leukoplakia ventral tongue      * Surgery not found *  No orders of the defined types were placed in this encounter.    Return in about 1 year (around 9/17/2021) for Recheck.       Patient Instructions   Call for change noted in lesions or any other oral problems  Follow-up in 1 year    I advised the patient of the risks in continuing to use tobacco and recommended complete cessation, The inherent risks including the risk of disability, development of a malignancy and/or death was discussed.  The patient indicated understanding.

## 2020-09-17 ENCOUNTER — OFFICE VISIT (OUTPATIENT)
Dept: OTOLARYNGOLOGY | Facility: CLINIC | Age: 70
End: 2020-09-17

## 2020-09-17 VITALS
TEMPERATURE: 97.5 F | SYSTOLIC BLOOD PRESSURE: 118 MMHG | HEIGHT: 63 IN | DIASTOLIC BLOOD PRESSURE: 60 MMHG | HEART RATE: 79 BPM | BODY MASS INDEX: 32.78 KG/M2 | WEIGHT: 185 LBS

## 2020-09-17 DIAGNOSIS — K13.21 ORAL LEUKOPLAKIA: Primary | ICD-10-CM

## 2020-09-17 PROCEDURE — 99213 OFFICE O/P EST LOW 20 MIN: CPT | Performed by: OTOLARYNGOLOGY

## 2020-09-17 NOTE — PATIENT INSTRUCTIONS
Call for change noted in lesions or any other oral problems  Follow-up in 1 year    I advised the patient of the risks in continuing to use tobacco and recommended complete cessation, The inherent risks including the risk of disability, development of a malignancy and/or death was discussed.  The patient indicated understanding.

## 2020-09-30 ENCOUNTER — APPOINTMENT (OUTPATIENT)
Dept: GENERAL RADIOLOGY | Age: 70
End: 2020-09-30
Payer: MEDICARE

## 2020-09-30 ENCOUNTER — HOSPITAL ENCOUNTER (EMERGENCY)
Age: 70
Discharge: HOME | End: 2020-10-01
Attending: EMERGENCY MEDICINE
Payer: MEDICARE

## 2020-09-30 LAB
ADENOVIRUS BY PCR: NOT DETECTED
ALBUMIN SERPL-MCNC: 4.4 G/DL (ref 3.5–5.2)
ALP BLD-CCNC: 71 U/L (ref 35–104)
ALT SERPL-CCNC: 20 U/L (ref 5–33)
ANION GAP SERPL CALCULATED.3IONS-SCNC: 14 MMOL/L (ref 7–19)
AST SERPL-CCNC: 21 U/L (ref 5–32)
BACTERIA: NEGATIVE /HPF
BASOPHILS ABSOLUTE: 0.1 K/UL (ref 0–0.2)
BASOPHILS RELATIVE PERCENT: 0.5 % (ref 0–1)
BILIRUB SERPL-MCNC: <0.2 MG/DL (ref 0.2–1.2)
BILIRUBIN URINE: NEGATIVE
BLOOD, URINE: NEGATIVE
BORDETELLA PARAPERTUSSIS BY PCR: NOT DETECTED
BORDETELLA PERTUSSIS BY PCR: NOT DETECTED
BUN BLDV-MCNC: 26 MG/DL (ref 8–23)
CALCIUM SERPL-MCNC: 9.2 MG/DL (ref 8.8–10.2)
CHLAMYDOPHILIA PNEUMONIAE BY PCR: NOT DETECTED
CHLORIDE BLD-SCNC: 100 MMOL/L (ref 98–111)
CLARITY: CLEAR
CO2: 25 MMOL/L (ref 22–29)
COLOR: YELLOW
CORONAVIRUS 229E BY PCR: NOT DETECTED
CORONAVIRUS HKU1 BY PCR: NOT DETECTED
CORONAVIRUS NL63 BY PCR: NOT DETECTED
CORONAVIRUS OC43 BY PCR: NOT DETECTED
CREAT SERPL-MCNC: 1 MG/DL (ref 0.5–0.9)
CRYSTALS, UA: ABNORMAL /HPF
EOSINOPHILS ABSOLUTE: 0.1 K/UL (ref 0–0.6)
EOSINOPHILS RELATIVE PERCENT: 0.8 % (ref 0–5)
EPITHELIAL CELLS, UA: 4 /HPF (ref 0–5)
GFR AFRICAN AMERICAN: >59
GFR NON-AFRICAN AMERICAN: 54
GLUCOSE BLD-MCNC: 60 MG/DL (ref 70–99)
GLUCOSE BLD-MCNC: 69 MG/DL (ref 74–109)
GLUCOSE URINE: NEGATIVE MG/DL
HCT VFR BLD CALC: 41.6 % (ref 37–47)
HEMOGLOBIN: 13.5 G/DL (ref 12–16)
HUMAN METAPNEUMOVIRUS BY PCR: NOT DETECTED
HUMAN RHINOVIRUS/ENTEROVIRUS BY PCR: NOT DETECTED
HYALINE CASTS: 1 /HPF (ref 0–8)
IMMATURE GRANULOCYTES #: 0.1 K/UL
INFLUENZA A BY PCR: NOT DETECTED
INFLUENZA B BY PCR: NOT DETECTED
KETONES, URINE: ABNORMAL MG/DL
LACTIC ACID: 3.4 MMOL/L (ref 0.5–1.9)
LEUKOCYTE ESTERASE, URINE: ABNORMAL
LYMPHOCYTES ABSOLUTE: 2 K/UL (ref 1.1–4.5)
LYMPHOCYTES RELATIVE PERCENT: 16.9 % (ref 20–40)
MCH RBC QN AUTO: 32.4 PG (ref 27–31)
MCHC RBC AUTO-ENTMCNC: 32.5 G/DL (ref 33–37)
MCV RBC AUTO: 99.8 FL (ref 81–99)
MONOCYTES ABSOLUTE: 0.5 K/UL (ref 0–0.9)
MONOCYTES RELATIVE PERCENT: 4.3 % (ref 0–10)
MYCOPLASMA PNEUMONIAE BY PCR: NOT DETECTED
NEUTROPHILS ABSOLUTE: 8.9 K/UL (ref 1.5–7.5)
NEUTROPHILS RELATIVE PERCENT: 77.1 % (ref 50–65)
NITRITE, URINE: NEGATIVE
PARAINFLUENZA VIRUS 1 BY PCR: NOT DETECTED
PARAINFLUENZA VIRUS 2 BY PCR: NOT DETECTED
PARAINFLUENZA VIRUS 3 BY PCR: NOT DETECTED
PARAINFLUENZA VIRUS 4 BY PCR: NOT DETECTED
PDW BLD-RTO: 13.7 % (ref 11.5–14.5)
PERFORMED ON: ABNORMAL
PH UA: 5.5 (ref 5–8)
PLATELET # BLD: 231 K/UL (ref 130–400)
PMV BLD AUTO: 9.1 FL (ref 9.4–12.3)
POTASSIUM REFLEX MAGNESIUM: 4.3 MMOL/L (ref 3.5–5)
PROTEIN UA: ABNORMAL MG/DL
RBC # BLD: 4.17 M/UL (ref 4.2–5.4)
RBC UA: 1 /HPF (ref 0–4)
RESPIRATORY SYNCYTIAL VIRUS BY PCR: NOT DETECTED
SARS-COV-2, PCR: NOT DETECTED
SODIUM BLD-SCNC: 139 MMOL/L (ref 136–145)
SPECIFIC GRAVITY UA: 1.01 (ref 1–1.03)
TOTAL PROTEIN: 7.4 G/DL (ref 6.6–8.7)
TROPONIN: <0.01 NG/ML (ref 0–0.03)
UROBILINOGEN, URINE: 0.2 E.U./DL
WBC # BLD: 11.5 K/UL (ref 4.8–10.8)
WBC UA: 2 /HPF (ref 0–5)

## 2020-09-30 PROCEDURE — 6360000002 HC RX W HCPCS: Performed by: EMERGENCY MEDICINE

## 2020-09-30 PROCEDURE — 83605 ASSAY OF LACTIC ACID: CPT

## 2020-09-30 PROCEDURE — 80053 COMPREHEN METABOLIC PANEL: CPT

## 2020-09-30 PROCEDURE — 96374 THER/PROPH/DIAG INJ IV PUSH: CPT

## 2020-09-30 PROCEDURE — 96375 TX/PRO/DX INJ NEW DRUG ADDON: CPT

## 2020-09-30 PROCEDURE — 84484 ASSAY OF TROPONIN QUANT: CPT

## 2020-09-30 PROCEDURE — 85025 COMPLETE CBC W/AUTO DIFF WBC: CPT

## 2020-09-30 PROCEDURE — 99999 PR OFFICE/OUTPT VISIT,PROCEDURE ONLY: CPT | Performed by: EMERGENCY MEDICINE

## 2020-09-30 PROCEDURE — 71045 X-RAY EXAM CHEST 1 VIEW: CPT

## 2020-09-30 PROCEDURE — 2580000003 HC RX 258: Performed by: EMERGENCY MEDICINE

## 2020-09-30 PROCEDURE — 93005 ELECTROCARDIOGRAM TRACING: CPT | Performed by: EMERGENCY MEDICINE

## 2020-09-30 PROCEDURE — 99285 EMERGENCY DEPT VISIT HI MDM: CPT

## 2020-09-30 PROCEDURE — 36415 COLL VENOUS BLD VENIPUNCTURE: CPT

## 2020-09-30 PROCEDURE — 99284 EMERGENCY DEPT VISIT MOD MDM: CPT

## 2020-09-30 RX ORDER — 0.9 % SODIUM CHLORIDE 0.9 %
1000 INTRAVENOUS SOLUTION INTRAVENOUS ONCE
Status: COMPLETED | OUTPATIENT
Start: 2020-09-30 | End: 2020-09-30

## 2020-09-30 RX ORDER — ONDANSETRON 2 MG/ML
4 INJECTION INTRAMUSCULAR; INTRAVENOUS ONCE
Status: COMPLETED | OUTPATIENT
Start: 2020-09-30 | End: 2020-09-30

## 2020-09-30 RX ORDER — DEXTROSE MONOHYDRATE 25 G/50ML
INJECTION, SOLUTION INTRAVENOUS
Status: DISCONTINUED
Start: 2020-09-30 | End: 2020-10-01 | Stop reason: HOSPADM

## 2020-09-30 RX ORDER — DICLOFENAC SODIUM 75 MG/1
75 TABLET, DELAYED RELEASE ORAL 2 TIMES DAILY
Status: ON HOLD | COMMUNITY
Start: 2018-10-30 | End: 2021-05-14 | Stop reason: SDUPTHER

## 2020-09-30 RX ORDER — DEXTROSE MONOHYDRATE 25 G/50ML
25 INJECTION, SOLUTION INTRAVENOUS ONCE
Status: COMPLETED | OUTPATIENT
Start: 2020-09-30 | End: 2020-09-30

## 2020-09-30 RX ADMIN — DEXTROSE MONOHYDRATE 25 G: 25 INJECTION, SOLUTION INTRAVENOUS at 23:39

## 2020-09-30 RX ADMIN — ONDANSETRON 4 MG: 2 INJECTION INTRAMUSCULAR; INTRAVENOUS at 22:17

## 2020-09-30 RX ADMIN — SODIUM CHLORIDE 1000 ML: 9 INJECTION, SOLUTION INTRAVENOUS at 22:17

## 2020-09-30 ASSESSMENT — ENCOUNTER SYMPTOMS
SHORTNESS OF BREATH: 1
NAUSEA: 1
ABDOMINAL PAIN: 0
VOMITING: 0
COUGH: 0

## 2020-10-01 VITALS
HEART RATE: 66 BPM | TEMPERATURE: 97.4 F | RESPIRATION RATE: 25 BRPM | WEIGHT: 185 LBS | SYSTOLIC BLOOD PRESSURE: 137 MMHG | OXYGEN SATURATION: 89 % | HEIGHT: 63 IN | BODY MASS INDEX: 32.78 KG/M2 | DIASTOLIC BLOOD PRESSURE: 73 MMHG

## 2020-10-01 LAB
CHP ED QC CHECK: NORMAL
EKG P AXIS: 45 DEGREES
EKG P-R INTERVAL: 132 MS
EKG Q-T INTERVAL: 440 MS
EKG QRS DURATION: 88 MS
EKG QTC CALCULATION (BAZETT): 454 MS
EKG T AXIS: 54 DEGREES
GLUCOSE BLD-MCNC: 161 MG/DL (ref 70–99)
GLUCOSE BLD-MCNC: 211 MG/DL (ref 70–99)
PERFORMED ON: ABNORMAL
PERFORMED ON: ABNORMAL

## 2020-10-01 PROCEDURE — 81001 URINALYSIS AUTO W/SCOPE: CPT

## 2020-10-01 PROCEDURE — 82947 ASSAY GLUCOSE BLOOD QUANT: CPT

## 2020-10-01 NOTE — ED NOTES
Pt states she was outside smoking when she started to have hot sweat and couldn't get cooled off, pt c/o headache all day of SBP in the 200's. PT c/o nausea and diaphoresis. PT took 2 or her clonidine's at 1800.      Aden Toledo, CHILO  09/30/20 9891

## 2020-10-01 NOTE — ED PROVIDER NOTES
[address] [facility] EMERGENCY DEPT  eMERGENCYdEPARTMENT eNCOUnter      Pt Name: Monserrat Gutierrez  MRN: [id number]  [place] 1950  Date of evaluation: 9/30/2020  Puja Salazar MD    Emergency Department care of this patient was assumed at [phone] from Dr. Dee Dee Valencia. We have discussed the case and the plan of care. I have seen and evaluated patient and reviewed ED course. Pt presented for diaphoresis and elevated BP. SXS began around 1800 this evening. Pt had taken her BP at that time and took two 0.1 Clonidine PTA. At around 2100 pt's daughter tells me that pt's clothes were soaked through. Pt was nauseated. She had been outside smoking and when she came back in is when sxs started. She got lightheaded and dizzy. She became so tired instantly that she needed to go lie down. Pt was down for a couple hrs after the sxs hit. She denies any CP. Confirms SOB. Denies any fever. CHIEF COMPLAINT       Chief Complaint   Patient presents with    Hypertension     diaphoretic at home    Fatigue         PHYSICAL EXAM    (up to 7 for level 4, 8 or more for level 5)     ED Triage Vitals [09/30/20 2124]   BP Temp Temp src Pulse Resp SpO2 Height Weight   (!) 174/78 97.4 °F (36.3 °C) -- 72 20 94 % 5' 3\" (1.6 m) 185 lb (83.9 kg)       Physical Exam  Vitals signs and nursing note reviewed. Constitutional:       General: She is not in acute distress. Appearance: Normal appearance. She is not ill-appearing. HENT:      Head: Normocephalic and atraumatic. Nose: Nose normal.      Mouth/Throat:      Mouth: Mucous membranes are moist.      Pharynx: Oropharynx is clear. Eyes:      Extraocular Movements: Extraocular movements intact. Pupils: Pupils are equal, round, and reactive to light. Cardiovascular:      Rate and Rhythm: Normal rate and regular rhythm. Heart sounds: No murmur. Pulmonary:      Effort: Pulmonary effort is normal.      Breath sounds: Normal breath sounds.  No wheezing, rhonchi or ACID, PLASMA - Abnormal; Notable for the following components:    Lactic Acid 3.4 (*)     All other components within normal limits    Narrative:     Rogelio Thornton tel. ,  Chemistry results called to and read back by Shweta Coronel RN in ED, 09/30/2020  22:37, by [address] [place] [address] - Abnormal; Notable for the following components:    Bacteria, UA NEGATIVE (*)     Crystals, UA NEG (*)     All other components within normal limits   POCT GLUCOSE - Abnormal; Notable for the following components:    POC Glucose 60 (*)     All other components within normal limits   POCT GLUCOSE - Abnormal; Notable for the following components:    POC Glucose 161 (*)     All other components within normal limits   POCT GLUCOSE - Abnormal; Notable for the following components:    POC Glucose 211 (*)     All other components within normal limits   POCT GLUCOSE - Normal   RESPIRATORY PANEL, MOLECULAR   TROPONIN   POCT GLUCOSE   POCT GLUCOSE       All other labs were within normal range or not returned as of this dictation. EMERGENCY DEPARTMENT COURSE and DIFFERENTIAL DIAGNOSIS/MDM:   Vitals:    Vitals:    10/01/20 0005 10/01/20 0035 10/01/20 0105 10/01/20 0135   BP: (!) 165/74 (!) 168/81 (!) 168/73 137/73   Pulse: 69 65 66 66   Resp: 17 25 26 25   Temp:       SpO2: 96% 90% (!) 89% (!) 89%   Weight:       Height:           MDM  Number of Diagnoses or Management Options  Hypoglycemia: new and requires workup  Diagnosis management comments: Work-up this evening revealed that patient was quite hypoglycemic. Her initial blood glucose on her chemistry was 69 and when I repeated it, once I took over the patient, it was 60. I gave patient an amp of D50. We also fed her. Repeat glucose an hour later was much improved. It looks like patient will typically run anywhere between the 150s and 200s. She felt significantly better after the glucose level improved. Her color returned. She became more alert. She was monitored closely.

## 2020-10-01 NOTE — ED PROVIDER NOTES
[address] [facility] EMERGENCY DEPT  eMERGENCY dEPARTMENT eNCOUnter      Pt Name: Eddie Benites  MRN: [id number]  [place] 1950  Date of evaluation: 9/30/2020  Provider: Luigi Carlin MD    CHIEF COMPLAINT       Chief Complaint   Patient presents with    Hypertension     diaphoretic at home    Fatigue         HISTORY OF PRESENT ILLNESS   (Location/Symptom, Timing/Onset,Context/Setting, Quality, Duration, Modifying Factors, Severity)  Note limiting factors. Eddie Benites is a 65 y.o. female who presents to the emergency department      The history is provided by the patient. Fatigue   Severity:  Moderate  Onset quality:  Sudden  Duration: ~1800, \"drenched in sweat\", nausea, fatigue. Progression:  Resolved  Chronicity:  New  Relieved by:  None tried  Worsened by:  Nothing  Ineffective treatments:  None tried  Associated symptoms: nausea and shortness of breath    Associated symptoms: no abdominal pain, no chest pain, no cough, no dysuria, no fever, no headaches, no loss of consciousness, no myalgias, no stroke symptoms and no vomiting    Associated symptoms comment:  Near-syncope, BP was 200/100  Risk factors: no coronary artery disease        NursingNotes were reviewed. REVIEW OF SYSTEMS    (2-9 systems for level 4, 10 or more for level 5)     Review of Systems   Constitutional: Positive for fatigue. Negative for fever. Respiratory: Positive for shortness of breath. Negative for cough. Cardiovascular: Negative for chest pain. Gastrointestinal: Positive for nausea. Negative for abdominal pain and vomiting. Genitourinary: Negative for dysuria. Musculoskeletal: Negative for myalgias. Neurological: Negative for loss of consciousness and headaches. All other systems reviewed and are negative. Except as noted above the remainder of the review of systems was reviewed and negative.        PAST MEDICAL HISTORY     Past Medical History:   Diagnosis Date    Carpal tunnel syndrome     Chronic back pain     [facility]     DVT (deep venous thrombosis) ([facility]     Hyperlipidemia     Hypertension     Osteoarthritis     Pancreatitis     Tendonitis     Unspecified sleep apnea     Pt does not sleep with C-pap machine    Varicose vein 2014         SURGICALHISTORY       Past Surgical History:   Procedure Laterality Date    APPENDECTOMY      BACK SURGERY      x2    CARPAL TUNNEL RELEASE      right hand     SECTION      X 3    CHOLECYSTECTOMY      COLONOSCOPY  08    COLONOSCOPY  10-    Dr Dianne Mednieta: tubular adenoma    CYST REMOVAL      GANGLION CYST REMOVAL    ERCP  09    HYSTERECTOMY      TOTAL    JOINT REPLACEMENT Bilateral     Knee    KNEE ARTHROSCOPY      x2    MOUTH SURGERY      teeth pulled    NASAL SEPTUM SURGERY      NECK SURGERY      UPPER GASTROINTESTINAL ENDOSCOPY  3/7/07    UPPER GASTROINTESTINAL ENDOSCOPY  2011    Dr Dianne Mendieta: gastritis, small hiatal hernia    VARICOSE VEIN SURGERY Right 14 [facility]    GSV RF Ablation    VEIN SURGERY  1967    ? left leg vein stripping         CURRENT MEDICATIONS       Discharge Medication List as of 10/1/2020  2:31 AM      CONTINUE these medications which have NOT CHANGED    Details   diclofenac (VOLTAREN) 75 MG EC tablet diclofenac sodium 75 mg tablet,delayed releaseHistorical Med      cloNIDine (CATAPRES) 0.1 MG tablet Take 0.1 mg by mouth daily as needed for High Blood PressureHistorical Med      glimepiride (AMARYL) 2 MG tablet daily as needed Historical Med      oxyCODONE-acetaminophen (PERCOCET)  MG per tablet Historical Med      metFORMIN (GLUCOPHAGE) 500 MG tablet Take 1,000 mg by mouth 2 times daily       bisoprolol (ZEBETA) 5 MG tablet Take 10 mg by mouth daily Historical Med      atorvastatin (LIPITOR) 20 MG tablet Take 20 mg by mouth daily. DULoxetine HCl (CYMBALTA PO) Take 60 mg by mouth daily.       pantoprazole (PROTONIX) 40 MG tablet TAKE 1 TABLET BY MOUTH EVERY DAY BEFORE BREAKFAST, Disp-30 tablet,R-5Normal      ondansetron (ZOFRAN) 4 MG tablet Take one tablet 1 hour prior to starting bowel prep. If nausea occurs take a second tablet. Take 1-2 tablets every 6 hours for nausea., Disp-15 tablet, R-0Normal      tiZANidine (ZANAFLEX) 4 MG tablet Take 4 mg by mouth every 8 hours as neededHistorical Med      busPIRone (BUSPAR) 15 MG tablet Take 15 mg by mouth daily as neededHistorical Med             ALLERGIES     Codeine; Amoxicillin-pot clavulanate; Best-c [ascorbate];  Hctz; Hydrochlorothiazide; Irbesartan; Irbesartan-hydrochlorothiazide; Simvastatin; and Thiazide-type diuretics    FAMILY HISTORY       Family History   Problem Relation Age of Onset    Heart Disease Mother     Kidney Disease Mother     Cancer Father         liver cancer    Liver Cancer Father     High Blood Pressure Sister     High Blood Pressure Brother     High Cholesterol Brother     Heart Disease Brother     Colon Cancer Neg Hx     Colon Polyps Neg Hx     Esophageal Cancer Neg Hx     Liver Disease Neg Hx     Rectal Cancer Neg Hx     Stomach Cancer Neg Hx           SOCIAL HISTORY       Social History     Socioeconomic History    Marital status:      Spouse name: Not on file    Number of children: Not on file    Years of education: Not on file    Highest education level: Not on file   Occupational History    Not on file   Social Needs    Financial resource strain: Not on file    Food insecurity     Worry: Not on file     Inability: Not on file    Transportation needs     Medical: Not on file     Non-medical: Not on file   Tobacco Use    Smoking status: Current Every Day Smoker     Packs/day: 1.00     Years: 40.00     Pack years: 40.00     Types: Cigarettes    Smokeless tobacco: Never Used   Substance and Sexual Activity    Alcohol use: No    Drug use: No    Sexual activity: Not on file   Lifestyle    Physical activity     Days per week: Not on file     Minutes per session: Not on file  Stress: Not on file   Relationships    Social connections     Talks on phone: Not on file     Gets together: Not on file     Attends Anabaptism service: Not on file     Active member of club or organization: Not on file     Attends meetings of clubs or organizations: Not on file     Relationship status: Not on file    Intimate partner violence     Fear of current or ex partner: Not on file     Emotionally abused: Not on file     Physically abused: Not on file     Forced sexual activity: Not on file   Other Topics Concern    Not on file   Social History Narrative    Not on file       SCREENINGS    [place] Coma Scale  Eye Opening: Spontaneous  Best Verbal Response: Oriented  Best Motor Response: Obeys commands  Evelio Coma Scale Score: 15 @FLOW([id number])@      PHYSICAL EXAM    (up to 7 for level 4, 8 or more for level 5)     ED Triage Vitals [09/30/20 2124]   BP Temp Temp src Pulse Resp SpO2 Height Weight   (!) 174/78 97.4 °F (36.3 °C) -- 72 20 94 % 5' 3\" (1.6 m) 185 lb (83.9 kg)       Physical Exam  Vitals signs and nursing note reviewed. Constitutional:       General: She is not in acute distress. Appearance: Normal appearance. She is normal weight. She is not ill-appearing, toxic-appearing or diaphoretic. HENT:      Nose: Nose normal.      Mouth/Throat:      Mouth: Mucous membranes are moist.      Pharynx: Oropharynx is clear. Eyes:      Conjunctiva/sclera: Conjunctivae normal.   Neck:      Musculoskeletal: Normal range of motion and neck supple. Cardiovascular:      Rate and Rhythm: Normal rate and regular rhythm. Heart sounds: Normal heart sounds. Pulmonary:      Effort: Pulmonary effort is normal.      Breath sounds: Normal breath sounds. Abdominal:      Tenderness: There is no abdominal tenderness. Musculoskeletal:      Right lower leg: No edema. Left lower leg: No edema. Skin:     General: Skin is warm and dry. Neurological:      General: No focal deficit present. Mental Status: She is alert and oriented to person, place, and time. Cranial Nerves: No cranial nerve deficit. Psychiatric:         Mood and Affect: Mood normal.         DIAGNOSTIC RESULTS     EKG: All EKG's are interpreted by the Emergency Department Physician who either signs or Co-signsthis chart in the absence of a cardiologist.    EKG:  Regular rate and rhythm. Normal P waves and MN interval. Normal QRS. Normal QT interval. No ST elevation or depression. This EKG was interpreted by me. RADIOLOGY:   Non-plain filmimages such as CT, Ultrasound and MRI are read by the radiologist. Plain radiographic images are visualized and preliminarily interpreted by the emergency physician with the below findings:        Interpretation per the Radiologist below, if available at the time ofthis note:    XR CHEST PORTABLE   Final Result   The poor lung expansion but no active cardiopulmonary   disease.    Signed by Dr Xavier Hickman on 10/1/2020 6:56 AM            ED BEDSIDE ULTRASOUND:   Performed by ED Physician - none    LABS:  Labs Reviewed   CBC WITH AUTO DIFFERENTIAL - Abnormal; Notable for the following components:       Result Value    WBC 11.5 (*)     RBC 4.17 (*)     MCV 99.8 (*)     MCH 32.4 (*)     MCHC 32.5 (*)     MPV 9.1 (*)     Neutrophils % 77.1 (*)     Lymphocytes % 16.9 (*)     Neutrophils Absolute 8.9 (*)     All other components within normal limits   COMPREHENSIVE METABOLIC PANEL W/ REFLEX TO MG FOR LOW K - Abnormal; Notable for the following components:    Glucose 69 (*)     BUN 26 (*)     CREATININE 1.0 (*)     GFR Non- 54 (*)     All other components within normal limits   URINE RT REFLEX TO CULTURE - Abnormal; Notable for the following components:    Ketones, Urine TRACE (*)     Protein, UA TRACE (*)     Leukocyte Esterase, Urine TRACE (*)     All other components within normal limits   LACTIC ACID, PLASMA - Abnormal; Notable for the following components:    Lactic Acid 3.4 (*) All other components within normal limits    Narrative:     Alexey Bernarda tel. ,  Chemistry results called to and read back by Abdoulaye Brito RN in ED, 09/30/2020  22:37, by [address] [place] [address] - Abnormal; Notable for the following components:    Bacteria, UA NEGATIVE (*)     Crystals, UA NEG (*)     All other components within normal limits   POCT GLUCOSE - Abnormal; Notable for the following components:    POC Glucose 60 (*)     All other components within normal limits   POCT GLUCOSE - Abnormal; Notable for the following components:    POC Glucose 161 (*)     All other components within normal limits   POCT GLUCOSE - Abnormal; Notable for the following components:    POC Glucose 211 (*)     All other components within normal limits   POCT GLUCOSE - Normal   RESPIRATORY PANEL, MOLECULAR   TROPONIN   POCT GLUCOSE   POCT GLUCOSE       All other labs were within normal range or not returned as of this dictation. EMERGENCY DEPARTMENT COURSE and DIFFERENTIAL DIAGNOSIS/MDM:   Vitals:    Vitals:    10/01/20 0005 10/01/20 0035 10/01/20 0105 10/01/20 0135   BP: (!) 165/74 (!) 168/81 (!) 168/73 137/73   Pulse: 69 65 66 66   Resp: 17 25 26 25   Temp:       SpO2: 96% 90% (!) 89% (!) 89%   Weight:       Height:               MDM  Number of Diagnoses or Management Options  Diagnosis management comments: CXR - NAD. Endorsed to Dr. Osmel Mcgregor [phone]. CRITICAL CARE TIME   Total Critical Care time was 0 minutes, excluding separately reportable procedures. There was a high probability of clinically significant/lifethreatening deterioration in the patient's condition which required my urgent intervention. CONSULTS:  None    PROCEDURES:  Unless otherwise noted below, none     Procedures    FINAL IMPRESSION      1.  Hypoglycemia          DISPOSITION/PLAN   DISPOSITION        PATIENT REFERRED TO:  [address]  [facility] [address]  [address]  [phone]    Call in 3 days  For follow up      DISCHARGE MEDICATIONS:  Discharge Medication List as of 10/1/2020  2:31 AM             (Please note that portions of this note were completed with a voice recognition program.  Efforts were made to edit the dictations but occasionally words are mis-transcribed.)    Noble Gerard MD (electronically signed)  Attending Emergency Physician          Noble Gerard MD  10/04/20 [phone] [id number]

## 2020-10-10 NOTE — PLAN OF CARE
Problem: Falls - Risk of:  Goal: Will remain free from falls  Will remain free from falls   Outcome: Ongoing    Goal: Absence of physical injury  Absence of physical injury   Outcome: Ongoing      Problem: Risk for Impaired Skin Integrity  Goal: Tissue integrity - skin and mucous membranes  Structural intactness and normal physiological function of skin and  mucous membranes.    Outcome: Ongoing      Problem: Pain:  Goal: Pain level will decrease  Pain level will decrease   Outcome: Ongoing    Goal: Control of acute pain  Control of acute pain   Outcome: Ongoing    Goal: Control of chronic pain  Control of chronic pain   Outcome: Ongoing      Problem: Nutrition  Goal: Optimal nutrition therapy  Nutrition Problem: Inadequate oral intake  Intervention: Food and/or Nutrient Delivery: Continue current diet  Nutritional Goals: diet progression     Outcome: Ongoing Take complete course of cefdinir.  Take Diflucan as needed for subsequent urinary tract infection.    Drink plenty of fluids.    Follow-up with your primary care provider or [facility] services in 1 week for recheck as needed.

## 2020-11-16 ENCOUNTER — OFFICE VISIT (OUTPATIENT)
Age: 70
End: 2020-11-16

## 2020-11-16 VITALS — TEMPERATURE: 97.8 F | OXYGEN SATURATION: 97 % | HEART RATE: 68 BPM

## 2020-11-20 LAB — SARS-COV-2, NAA: NOT DETECTED

## 2020-12-17 LAB
ALBUMIN SERPL-MCNC: 4 G/DL (ref 3.5–5.2)
ALP BLD-CCNC: 91 U/L (ref 35–104)
ALT SERPL-CCNC: 23 U/L (ref 5–33)
ANION GAP SERPL CALCULATED.3IONS-SCNC: 10 MMOL/L (ref 7–19)
AST SERPL-CCNC: 17 U/L (ref 5–32)
BASOPHILS ABSOLUTE: 0.1 K/UL (ref 0–0.2)
BASOPHILS RELATIVE PERCENT: 1.2 % (ref 0–1)
BILIRUB SERPL-MCNC: 0.3 MG/DL (ref 0.2–1.2)
BUN BLDV-MCNC: 21 MG/DL (ref 8–23)
CALCIUM SERPL-MCNC: 9 MG/DL (ref 8.8–10.2)
CHLORIDE BLD-SCNC: 100 MMOL/L (ref 98–111)
CHOLESTEROL, TOTAL: 137 MG/DL (ref 160–199)
CO2: 27 MMOL/L (ref 22–29)
CREAT SERPL-MCNC: 1 MG/DL (ref 0.5–0.9)
CREATININE URINE: 36.6 MG/DL (ref 4.2–622)
EOSINOPHILS ABSOLUTE: 0.3 K/UL (ref 0–0.6)
EOSINOPHILS RELATIVE PERCENT: 4.6 % (ref 0–5)
GFR AFRICAN AMERICAN: >59
GFR NON-AFRICAN AMERICAN: 54
GLUCOSE BLD-MCNC: 167 MG/DL (ref 74–109)
HBA1C MFR BLD: 8.4 % (ref 4–6)
HCT VFR BLD CALC: 39.9 % (ref 37–47)
HDLC SERPL-MCNC: 30 MG/DL (ref 65–121)
HEMOGLOBIN: 12.7 G/DL (ref 12–16)
IMMATURE GRANULOCYTES #: 0 K/UL
LDL CHOLESTEROL CALCULATED: 76 MG/DL
LYMPHOCYTES ABSOLUTE: 2.8 K/UL (ref 1.1–4.5)
LYMPHOCYTES RELATIVE PERCENT: 42 % (ref 20–40)
MCH RBC QN AUTO: 32.2 PG (ref 27–31)
MCHC RBC AUTO-ENTMCNC: 31.8 G/DL (ref 33–37)
MCV RBC AUTO: 101.3 FL (ref 81–99)
MICROALBUMIN UR-MCNC: 2.9 MG/DL (ref 0–19)
MICROALBUMIN/CREAT UR-RTO: 79.2 MG/G
MONOCYTES ABSOLUTE: 0.5 K/UL (ref 0–0.9)
MONOCYTES RELATIVE PERCENT: 7.6 % (ref 0–10)
NEUTROPHILS ABSOLUTE: 3 K/UL (ref 1.5–7.5)
NEUTROPHILS RELATIVE PERCENT: 44.3 % (ref 50–65)
PDW BLD-RTO: 13.6 % (ref 11.5–14.5)
PLATELET # BLD: 249 K/UL (ref 130–400)
PMV BLD AUTO: 9.4 FL (ref 9.4–12.3)
POTASSIUM SERPL-SCNC: 4.6 MMOL/L (ref 3.5–5)
RBC # BLD: 3.94 M/UL (ref 4.2–5.4)
SODIUM BLD-SCNC: 137 MMOL/L (ref 136–145)
T4 FREE: 1.28 NG/DL (ref 0.93–1.7)
TOTAL PROTEIN: 6.6 G/DL (ref 6.6–8.7)
TRIGL SERPL-MCNC: 157 MG/DL (ref 0–149)
TSH SERPL DL<=0.05 MIU/L-ACNC: 1.18 UIU/ML (ref 0.27–4.2)
WBC # BLD: 6.7 K/UL (ref 4.8–10.8)

## 2021-03-24 LAB
ALBUMIN SERPL-MCNC: 4 G/DL (ref 3.5–5.2)
ALP BLD-CCNC: 76 U/L (ref 35–104)
ALT SERPL-CCNC: 18 U/L (ref 5–33)
ANION GAP SERPL CALCULATED.3IONS-SCNC: 9 MMOL/L (ref 7–19)
AST SERPL-CCNC: 13 U/L (ref 5–32)
BILIRUB SERPL-MCNC: 0.3 MG/DL (ref 0.2–1.2)
BUN BLDV-MCNC: 21 MG/DL (ref 8–23)
CALCIUM SERPL-MCNC: 9.1 MG/DL (ref 8.8–10.2)
CHLORIDE BLD-SCNC: 103 MMOL/L (ref 98–111)
CO2: 25 MMOL/L (ref 22–29)
CREAT SERPL-MCNC: 1 MG/DL (ref 0.5–0.9)
GFR AFRICAN AMERICAN: >59
GFR NON-AFRICAN AMERICAN: 54
GLUCOSE BLD-MCNC: 147 MG/DL (ref 74–109)
HBA1C MFR BLD: 7.6 % (ref 4–6)
POTASSIUM SERPL-SCNC: 4.9 MMOL/L (ref 3.5–5)
SODIUM BLD-SCNC: 137 MMOL/L (ref 136–145)
TOTAL PROTEIN: 6.7 G/DL (ref 6.6–8.7)
VITAMIN D 25-HYDROXY: 17 NG/ML

## 2021-04-08 ENCOUNTER — TELEPHONE (OUTPATIENT)
Dept: NEUROSURGERY | Age: 71
End: 2021-04-08

## 2021-04-21 ENCOUNTER — OFFICE VISIT (OUTPATIENT)
Dept: NEUROSURGERY | Age: 71
End: 2021-04-21
Payer: MEDICARE

## 2021-04-21 VITALS
WEIGHT: 180 LBS | SYSTOLIC BLOOD PRESSURE: 143 MMHG | HEIGHT: 64 IN | BODY MASS INDEX: 30.73 KG/M2 | DIASTOLIC BLOOD PRESSURE: 71 MMHG | HEART RATE: 70 BPM

## 2021-04-21 DIAGNOSIS — G89.29 CHRONIC MIDLINE THORACIC BACK PAIN: Primary | ICD-10-CM

## 2021-04-21 DIAGNOSIS — M54.6 CHRONIC MIDLINE THORACIC BACK PAIN: Primary | ICD-10-CM

## 2021-04-21 DIAGNOSIS — M54.16 LUMBAR RADICULOPATHY: ICD-10-CM

## 2021-04-21 DIAGNOSIS — M96.1 FAILED BACK SYNDROME OF LUMBAR SPINE: ICD-10-CM

## 2021-04-21 PROCEDURE — 3017F COLORECTAL CA SCREEN DOC REV: CPT | Performed by: NEUROLOGICAL SURGERY

## 2021-04-21 PROCEDURE — G8427 DOCREV CUR MEDS BY ELIG CLIN: HCPCS | Performed by: NEUROLOGICAL SURGERY

## 2021-04-21 PROCEDURE — 4040F PNEUMOC VAC/ADMIN/RCVD: CPT | Performed by: NEUROLOGICAL SURGERY

## 2021-04-21 PROCEDURE — 1090F PRES/ABSN URINE INCON ASSESS: CPT | Performed by: NEUROLOGICAL SURGERY

## 2021-04-21 PROCEDURE — G8400 PT W/DXA NO RESULTS DOC: HCPCS | Performed by: NEUROLOGICAL SURGERY

## 2021-04-21 PROCEDURE — 4004F PT TOBACCO SCREEN RCVD TLK: CPT | Performed by: NEUROLOGICAL SURGERY

## 2021-04-21 PROCEDURE — G8417 CALC BMI ABV UP PARAM F/U: HCPCS | Performed by: NEUROLOGICAL SURGERY

## 2021-04-21 PROCEDURE — 1123F ACP DISCUSS/DSCN MKR DOCD: CPT | Performed by: NEUROLOGICAL SURGERY

## 2021-04-21 PROCEDURE — 99204 OFFICE O/P NEW MOD 45 MIN: CPT | Performed by: NEUROLOGICAL SURGERY

## 2021-04-21 NOTE — PROGRESS NOTES
[facility]  Office Visit        Chief Complaint   Patient presents with   Yelena Randle Consultation     back pain. consult about a stimulator. HISTORY OF PRESENT ILLNESS:      The patient is a 65 y.o. female who presents as a referral from Dr. Doroteo Bosworth office to discuss placement of a spinal cord stimulator. She has a longstanding history of back pain with multiple previous lumbar spine fusion surgeries. She is an established patient with Dr. Edison Russell having received injections in the past however the lumbar injections have become ineffective in controlling her chronic lower back pain and leg pain. She describes pain in her lower back that is worsened by activity. The pain is mostly back pain with some pain in her right hip and leg. She endorses some chronic tingling in the lateral aspect of her right leg but no numbness. She feels generally weak in her lower extremities but denies any focal weakness. She endorses chronic constipation but denies any difficulty with incontinence of bowel or bladder. She recently underwent a trial of spinal cord stimulation with Dr. Edison Russell and she states she got \"99%\" pain relief of her back and leg pain while the trial leads were in place. She presents with a recent lumbar MRI scan from 2021 but she has not had a recent MRI of her thoracic spine.       MEDICAL HISTORY:             Past Medical History:   Diagnosis Date    Carpal tunnel syndrome     Chronic back pain     Diabetes ([address]     DVT (deep venous thrombosis) (HCC)     Hyperlipidemia     Hypertension     Osteoarthritis     Pancreatitis     Tendonitis     Unspecified sleep apnea     Pt does not sleep with C-pap machine    Varicose vein 2014       Past Surgical History:   Procedure Laterality Date    APPENDECTOMY      BACK SURGERY      x2    CARPAL TUNNEL RELEASE      right hand     SECTION      X 3    CHOLECYSTECTOMY      COLONOSCOPY  08    COLONOSCOPY  10-    Dr Gutierrez Press: tubular adenoma    CYST REMOVAL      GANGLION CYST REMOVAL    ERCP  9/14/09    HYSTERECTOMY      TOTAL    JOINT REPLACEMENT Bilateral     Knee    KNEE ARTHROSCOPY      x2    MOUTH SURGERY      teeth pulled    NASAL SEPTUM SURGERY      NECK SURGERY      UPPER GASTROINTESTINAL ENDOSCOPY  3/7/07    UPPER GASTROINTESTINAL ENDOSCOPY  7-5-2011    Dr Libia Cee: gastritis, small hiatal hernia    VARICOSE VEIN SURGERY Right 08/28/14 [facility]    GSV RF Ablation    VEIN SURGERY  1967    ? left leg vein stripping         Current Outpatient Medications:     diclofenac (VOLTAREN) 75 MG EC tablet, diclofenac sodium 75 mg tablet,delayed release, Disp: , Rfl:     pantoprazole (PROTONIX) 40 MG tablet, TAKE 1 TABLET BY MOUTH EVERY DAY BEFORE BREAKFAST, Disp: 30 tablet, Rfl: 5    ondansetron (ZOFRAN) 4 MG tablet, Take one tablet 1 hour prior to starting bowel prep. If nausea occurs take a second tablet. Take 1-2 tablets every 6 hours for nausea., Disp: 15 tablet, Rfl: 0    tiZANidine (ZANAFLEX) 4 MG tablet, Take 4 mg by mouth every 8 hours as needed, Disp: , Rfl:     busPIRone (BUSPAR) 15 MG tablet, Take 15 mg by mouth daily as needed, Disp: , Rfl:     cloNIDine (CATAPRES) 0.1 MG tablet, Take 0.1 mg by mouth daily as needed for High Blood Pressure, Disp: , Rfl:     glimepiride (AMARYL) 2 MG tablet, daily as needed , Disp: , Rfl:     oxyCODONE-acetaminophen (PERCOCET)  MG per tablet, , Disp: , Rfl:     metFORMIN (GLUCOPHAGE) 500 MG tablet, Take 1,000 mg by mouth 2 times daily , Disp: , Rfl:     bisoprolol (ZEBETA) 5 MG tablet, Take 10 mg by mouth daily , Disp: , Rfl:     atorvastatin (LIPITOR) 20 MG tablet, Take 20 mg by mouth daily. , Disp: , Rfl:     DULoxetine HCl (CYMBALTA PO), Take 60 mg by mouth daily. , Disp: , Rfl:     Allergies:  Codeine, Amoxicillin-pot clavulanate, Best-c [ascorbate], Hctz, Hydrochlorothiazide, Irbesartan, Irbesartan-hydrochlorothiazide, Simvastatin, and Thiazide-type diuretics Social History:   Social History     Tobacco Use   Smoking Status Current Every Day Smoker    Packs/day: 1.00    Years: 40.00    Pack years: 40.00    Types: Cigarettes   Smokeless Tobacco Never Used     Social History     Substance and Sexual Activity   Alcohol Use No         Family History:   Family History   Problem Relation Age of Onset    Heart Disease Mother     Kidney Disease Mother     Cancer Father         liver cancer    Liver Cancer Father     High Blood Pressure Sister     High Blood Pressure Brother     High Cholesterol Brother     Heart Disease Brother     Colon Cancer Neg Hx     Colon Polyps Neg Hx     Esophageal Cancer Neg Hx     Liver Disease Neg Hx     Rectal Cancer Neg Hx     Stomach Cancer Neg Hx        REVIEW OF SYSTEMS:    Constitutional: Negative. HENT: Negative. Eyes: Negative. Respiratory: Negative. Cardiovascular: Negative. Gastrointestinal: Negative. Genitourinary: Negative. Musculoskeletal: Positive for back pain. Skin: Negative. Neurological: Negative. Endo/Heme/Allergies: Negative. Psychiatric/Behavioral: Negative. Review of Systems was obtained by the medical assistant and reviewed by myself. PHYSICAL EXAM:    Vitals:    04/21/21 1305   BP: (!) 143/71   Pulse: 70       Constitutional: Appears well-developed and well-nourished. Eyes  conjunctiva normal.  Pupils react to light  Ear, nose,throat -Normal pinna and tragus, No scars, or lesions over external nose or ears, no obvious atrophy of tongue  Neck- symmetric, trachea midline, no jugular vein distension  Respiration- chest wall symmetric, normal effort without use of accessory muscles  Musculoskeletal  no significant wasting of muscles noted, no bony deformities, symmetric bulk  Extremities- no clubbing, cyanosis or edema  Skin  warm, dry, and intact. No rash,erythema, or pallor.   Psychiatric  mood, affect, and behavior appear normal.       NEUROLOGIC EXAM:    MENTAL at L1/2 with a disc protrusion and facet arthropathy that contributes to moderate central canal and bilateral foraminal stenosis. ASSESSMENT AND PLAN:  This is a 65 y.o. female who presents to discuss implantation of a spinal cord stimulator for chronic lower back pain and radicular pain with multiple previous lumbar fusion surgeries. She describes excellent relief of both her back and leg pain during her stimulator trial.  She has no focal weakness or sensory loss in her lower extremities to suggest additional lumbar decompression or fusion would be more appropriate for her. I advised her that I would like to obtain an MRI scan of her thoracic spine to ensure she does not have occult thoracic spinal stenosis that would be a contraindication to placement of the permanent stimulator electrode. I will plan to follow up with her when the MRI scan is complete to discuss surgery further.             Yosvany Calixto MD

## 2021-05-03 ENCOUNTER — TELEPHONE (OUTPATIENT)
Dept: NEUROSURGERY | Age: 71
End: 2021-05-03

## 2021-05-03 ENCOUNTER — OFFICE VISIT (OUTPATIENT)
Dept: NEUROSURGERY | Age: 71
End: 2021-05-03
Payer: MEDICARE

## 2021-05-03 VITALS
WEIGHT: 180 LBS | HEIGHT: 64 IN | SYSTOLIC BLOOD PRESSURE: 123 MMHG | BODY MASS INDEX: 30.73 KG/M2 | DIASTOLIC BLOOD PRESSURE: 60 MMHG | HEART RATE: 68 BPM

## 2021-05-03 DIAGNOSIS — M54.16 LUMBAR RADICULOPATHY: ICD-10-CM

## 2021-05-03 DIAGNOSIS — M96.1 FAILED BACK SYNDROME OF LUMBAR SPINE: Primary | ICD-10-CM

## 2021-05-03 PROCEDURE — G8427 DOCREV CUR MEDS BY ELIG CLIN: HCPCS | Performed by: NEUROLOGICAL SURGERY

## 2021-05-03 PROCEDURE — 4004F PT TOBACCO SCREEN RCVD TLK: CPT | Performed by: NEUROLOGICAL SURGERY

## 2021-05-03 PROCEDURE — G8400 PT W/DXA NO RESULTS DOC: HCPCS | Performed by: NEUROLOGICAL SURGERY

## 2021-05-03 PROCEDURE — 4040F PNEUMOC VAC/ADMIN/RCVD: CPT | Performed by: NEUROLOGICAL SURGERY

## 2021-05-03 PROCEDURE — 1123F ACP DISCUSS/DSCN MKR DOCD: CPT | Performed by: NEUROLOGICAL SURGERY

## 2021-05-03 PROCEDURE — 1090F PRES/ABSN URINE INCON ASSESS: CPT | Performed by: NEUROLOGICAL SURGERY

## 2021-05-03 PROCEDURE — G8417 CALC BMI ABV UP PARAM F/U: HCPCS | Performed by: NEUROLOGICAL SURGERY

## 2021-05-03 PROCEDURE — 99215 OFFICE O/P EST HI 40 MIN: CPT | Performed by: NEUROLOGICAL SURGERY

## 2021-05-03 PROCEDURE — 3017F COLORECTAL CA SCREEN DOC REV: CPT | Performed by: NEUROLOGICAL SURGERY

## 2021-05-03 NOTE — H&P (VIEW-ONLY)
 Osteoarthritis     Pancreatitis     Tendonitis     Unspecified sleep apnea     Pt does not sleep with C-pap machine    Varicose vein 2014       Past Surgical History:   Procedure Laterality Date    APPENDECTOMY      BACK SURGERY      x2    CARPAL TUNNEL RELEASE      right hand     SECTION      X 3    CHOLECYSTECTOMY      COLONOSCOPY  08    COLONOSCOPY  10-    Dr Sagrario Montaño: tubular adenoma    CYST REMOVAL      GANGLION CYST REMOVAL    ERCP  09    HYSTERECTOMY      TOTAL    JOINT REPLACEMENT Bilateral     Knee    KNEE ARTHROSCOPY      x2    MOUTH SURGERY      teeth pulled    NASAL SEPTUM SURGERY      NECK SURGERY      UPPER GASTROINTESTINAL ENDOSCOPY  3/7/07    UPPER GASTROINTESTINAL ENDOSCOPY  2011    Dr Sagrario Montaño: gastritis, small hiatal hernia    VARICOSE VEIN SURGERY Right 14 SJS    GSV RF Ablation    VEIN SURGERY  1967    ? left leg vein stripping         Current Outpatient Medications:     diclofenac (VOLTAREN) 75 MG EC tablet, diclofenac sodium 75 mg tablet,delayed release, Disp: , Rfl:     pantoprazole (PROTONIX) 40 MG tablet, TAKE 1 TABLET BY MOUTH EVERY DAY BEFORE BREAKFAST, Disp: 30 tablet, Rfl: 5    ondansetron (ZOFRAN) 4 MG tablet, Take one tablet 1 hour prior to starting bowel prep. If nausea occurs take a second tablet.  Take 1-2 tablets every 6 hours for nausea., Disp: 15 tablet, Rfl: 0    tiZANidine (ZANAFLEX) 4 MG tablet, Take 4 mg by mouth every 8 hours as needed, Disp: , Rfl:     busPIRone (BUSPAR) 15 MG tablet, Take 15 mg by mouth daily as needed, Disp: , Rfl:     cloNIDine (CATAPRES) 0.1 MG tablet, Take 0.1 mg by mouth daily as needed for High Blood Pressure, Disp: , Rfl:     glimepiride (AMARYL) 2 MG tablet, daily as needed , Disp: , Rfl:     oxyCODONE-acetaminophen (PERCOCET)  MG per tablet, , Disp: , Rfl:     metFORMIN (GLUCOPHAGE) 500 MG tablet, Take 1,000 mg by mouth 2 times daily , Disp: , Rfl:     bisoprolol (ZEBETA) 5 MG tablet, Take 10 mg by mouth daily , Disp: , Rfl:     atorvastatin (LIPITOR) 20 MG tablet, Take 20 mg by mouth daily. , Disp: , Rfl:     DULoxetine HCl (CYMBALTA PO), Take 60 mg by mouth daily. , Disp: , Rfl:     Allergies:  Codeine, Amoxicillin-pot clavulanate, Best-c [ascorbate], Hctz, Hydrochlorothiazide, Irbesartan, Irbesartan-hydrochlorothiazide, Simvastatin, and Thiazide-type diuretics    Social History:   Social History     Tobacco Use   Smoking Status Current Every Day Smoker    Packs/day: 1.00    Years: 40.00    Pack years: 40.00    Types: Cigarettes   Smokeless Tobacco Never Used     Social History     Substance and Sexual Activity   Alcohol Use No         Family History:   Family History   Problem Relation Age of Onset    Heart Disease Mother     Kidney Disease Mother     Cancer Father         liver cancer    Liver Cancer Father     High Blood Pressure Sister     High Blood Pressure Brother     High Cholesterol Brother     Heart Disease Brother     Colon Cancer Neg Hx     Colon Polyps Neg Hx     Esophageal Cancer Neg Hx     Liver Disease Neg Hx     Rectal Cancer Neg Hx     Stomach Cancer Neg Hx        REVIEW OF SYSTEMS:    Constitutional: Negative. HENT: Negative. Eyes: Negative. Respiratory: Negative. Cardiovascular: Negative. Gastrointestinal: Negative. Genitourinary: Negative. Musculoskeletal: Positive for back pain and joint pain. Skin: Negative. Neurological: Negative. Endo/Heme/Allergies: Negative. Psychiatric/Behavioral: Negative. Review of Systems was obtained by the medical assistant and reviewed by myself. PHYSICAL EXAM:    Vitals:    05/03/21 1334   BP: 123/60   Pulse: 68       Constitutional: Appears well-developed and well-nourished.    Eyes  conjunctiva normal.  Pupils react to light  Ear, nose,throat -Normal pinna and tragus, No scars, or lesions over external nose or ears, no obvious atrophy of tongue  Neck- symmetric, trachea midline, no jugular vein distension  Respiration- chest wall symmetric, normal effort without use of accessory muscles  Musculoskeletal  no significant wasting of muscles noted, no bony deformities, symmetric bulk  Extremities- no clubbing, cyanosis or edema  Skin  warm, dry, and intact. No rash,erythema, or pallor.   Psychiatric  mood, affect, and behavior appear normal.       NEUROLOGIC EXAM:    MENTAL STATUS: Alert, oriented, thought content appropriate    CRANIAL NERVES: PERRL, EOMI, symmetric facies, tongue midline    MOTOR:     Right Upper Extremity:    Deltoid: 5/5  Biceps: 5/5  Triceps: 5/5  Wrist Extension: 5/5  Finger Abduction: 5/5    Left Upper Extremity:    Deltoid: 5/5  Biceps: 5/5  Triceps: 5/5  Wrist Extension: 5/5  Finger Abduction: 5/5      Right Lower Extremity:    Hip Flexion: 5/5  Knee Extension: 5/5  Ankle Plantarflexion: 5/5  Ankle Dorsiflexion: 5/5      Left Lower Extremity:    Hip Flexion: 5/5  Knee Extension: 5/5  Ankle Plantarflexion: 5/5  Ankle Dorsiflexion: 5/5      SOMATOSENSORY:     Right Upper Extremity: normal light touch sensation  Left Upper Extremity: normal light touch sensation  Right Lower Extremity: normal light touch sensation  Left Lower Extremity: normal light touch sensation      REFLEXES:    Biceps: 2+  Patella: 2+    Rondon's: Negative      COORDINATION and GAIT:      Gait: Antalgic      DATA:    Lab Results   Component Value Date    WBC 6.7 12/17/2020    HGB 12.7 12/17/2020    HCT 39.9 12/17/2020    .3 (H) 12/17/2020     12/17/2020     Lab Results   Component Value Date     03/24/2021    K 4.9 03/24/2021     03/24/2021    CO2 25 03/24/2021    BUN 21 03/24/2021    CREATININE 1.0 (H) 03/24/2021    GLUCOSE 147 (H) 03/24/2021    CALCIUM 9.1 03/24/2021    PROT 6.7 03/24/2021    LABALBU 4.0 03/24/2021    BILITOT 0.3 03/24/2021    ALKPHOS 76 03/24/2021    AST 13 03/24/2021    ALT 18 03/24/2021    LABGLOM 54 (A) 03/24/2021 GFRAA >59 03/24/2021     Lab Results   Component Value Date    INR 0.95 12/05/2014    PROTIME 12.4 12/05/2014       No results found. IMAGING:    My interpretation of imaging studies:     MRI of the lumbar spine was reviewed, there are post-surgical changes with fusion from L2-S1 with interbody spacers and bilateral pedicle screws and rods at L3, L5 and S1. There is modest adjacent segment kyphosis above her fusion construct at L1/2 with a disc protrusion and facet arthropathy that contributes to moderate central canal and bilateral foraminal stenosis. MRI of the thoracic spine demonstrates multilevel spondylosis without any significant central canal stenosis in the thoracic spine.           ASSESSMENT AND PLAN:  This is a 65 y.o. female who returns to discuss implantation of a spinal cord stimulator for chronic lower back pain and radicular pain with multiple previous lumbar fusion surgeries. She describes excellent relief of both her back and leg pain during her stimulator trial.  She has no focal weakness or sensory loss in her lower extremities to suggest additional lumbar decompression or fusion would be more appropriate for her. 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 She voiced understanding and stated that she got such excellent pain relief during her stimulator trial that she wished to proceed with spinal cord stimulator placement. The stimulator implantation surgery (thoracic laminectomy of implantation of spinal cord stimulator lead and generator) was explained to her in detail, as were specific risks of bleeding, infection, neurologic injury/paralysis, mechanical device malfunction, lead migration, CSF leak, and need for additional surgeries were all discussed.   She voiced understanding and requested that we proceed. She is left handed and we will plan to implant her generator on the left side.             Shareen Hodgkin, MD

## 2021-05-03 NOTE — TELEPHONE ENCOUNTER
Also, I called and left a message fro Alexandra as patient needs neuromonitoring during surgery. No answer but I did leave a vm with my number for  alexandra to get in touch with me concerning this patient.

## 2021-05-03 NOTE — H&P
[facility]  History and Physical        Chief Complaint   Patient presents with    Follow-up     review imaging       HISTORY OF PRESENT ILLNESS:      Interval Update:  Planned follow up to review her recent thoracic MRI scan and discuss surgery to place a spinal cord stimulator. Her complaints are essentially unchanged. She continues to endorse back pain and leg pain that is exacerbated by activity and movement. She denies any lower extremity weakness or loss of bowel or bladder control. She again states that she got excellent pain relief during her stimulator trial.    HPI:  The patient is a 65 y.o. female who presents as a referral from Dr. [place] office to discuss placement of a spinal cord stimulator. She has a longstanding history of back pain with multiple previous lumbar spine fusion surgeries. She is an established patient with Dr. Kellie Su having received injections in the past however the lumbar injections have become ineffective in controlling her chronic lower back pain and leg pain. She describes pain in her lower back that is worsened by activity. The pain is mostly back pain with some pain in her right hip and leg. She endorses some chronic tingling in the lateral aspect of her right leg but no numbness. She feels generally weak in her lower extremities but denies any focal weakness. She endorses chronic constipation but denies any difficulty with incontinence of bowel or bladder. She recently underwent a trial of spinal cord stimulation with Dr. Kellie Su and she states she got \"99%\" pain relief of her back and leg pain while the trial leads were in place. She presents with a recent lumbar MRI scan from 1/2021 but she has not had a recent MRI of her thoracic spine.       MEDICAL HISTORY:             Past Medical History:   Diagnosis Date    Carpal tunnel syndrome     Chronic back pain     Diabetes ([address]     [facility]     Hyperlipidemia     Hypertension  Osteoarthritis     Pancreatitis     Tendonitis     Unspecified sleep apnea     Pt does not sleep with C-pap machine    Varicose vein 2014       Past Surgical History:   Procedure Laterality Date    APPENDECTOMY      BACK SURGERY      x2    CARPAL TUNNEL RELEASE      right hand     SECTION      X 3    CHOLECYSTECTOMY      COLONOSCOPY  08    COLONOSCOPY  10-    Dr Linnea Dao: tubular adenoma    CYST REMOVAL      GANGLION CYST REMOVAL    ERCP  09    HYSTERECTOMY      TOTAL    JOINT REPLACEMENT Bilateral     Knee    KNEE ARTHROSCOPY      x2    MOUTH SURGERY      teeth pulled    NASAL SEPTUM SURGERY      NECK SURGERY      UPPER GASTROINTESTINAL ENDOSCOPY  3/7/07    UPPER GASTROINTESTINAL ENDOSCOPY  2011    Dr Linnea Dao: gastritis, small hiatal hernia    VARICOSE VEIN SURGERY Right 14 SJS    GSV RF Ablation    VEIN SURGERY  1967    ? left leg vein stripping         Current Outpatient Medications:     diclofenac (VOLTAREN) 75 MG EC tablet, diclofenac sodium 75 mg tablet,delayed release, Disp: , Rfl:     pantoprazole (PROTONIX) 40 MG tablet, TAKE 1 TABLET BY MOUTH EVERY DAY BEFORE BREAKFAST, Disp: 30 tablet, Rfl: 5    ondansetron (ZOFRAN) 4 MG tablet, Take one tablet 1 hour prior to starting bowel prep. If nausea occurs take a second tablet.  Take 1-2 tablets every 6 hours for nausea., Disp: 15 tablet, Rfl: 0    tiZANidine (ZANAFLEX) 4 MG tablet, Take 4 mg by mouth every 8 hours as needed, Disp: , Rfl:     busPIRone (BUSPAR) 15 MG tablet, Take 15 mg by mouth daily as needed, Disp: , Rfl:     cloNIDine (CATAPRES) 0.1 MG tablet, Take 0.1 mg by mouth daily as needed for High Blood Pressure, Disp: , Rfl:     glimepiride (AMARYL) 2 MG tablet, daily as needed , Disp: , Rfl:     oxyCODONE-acetaminophen (PERCOCET)  MG per tablet, , Disp: , Rfl:     metFORMIN (GLUCOPHAGE) 500 MG tablet, Take 1,000 mg by mouth 2 times daily , Disp: , Rfl:     bisoprolol (ZEBETA) 5 MG tablet, Take 10 mg by mouth daily , Disp: , Rfl:     atorvastatin (LIPITOR) 20 MG tablet, Take 20 mg by mouth daily. , Disp: , Rfl:     DULoxetine HCl (CYMBALTA PO), Take 60 mg by mouth daily. , Disp: , Rfl:     Allergies:  Codeine, Amoxicillin-pot clavulanate, Best-c [ascorbate], Hctz, Hydrochlorothiazide, Irbesartan, Irbesartan-hydrochlorothiazide, Simvastatin, and Thiazide-type diuretics    Social History:   Social History     Tobacco Use   Smoking Status Current Every Day Smoker    Packs/day: 1.00    Years: 40.00    Pack years: 40.00    Types: Cigarettes   Smokeless Tobacco Never Used     Social History     Substance and Sexual Activity   Alcohol Use No         Family History:   Family History   Problem Relation Age of Onset    Heart Disease Mother     Kidney Disease Mother     Cancer Father         liver cancer    Liver Cancer Father     High Blood Pressure Sister     High Blood Pressure Brother     High Cholesterol Brother     Heart Disease Brother     Colon Cancer Neg Hx     Colon Polyps Neg Hx     Esophageal Cancer Neg Hx     Liver Disease Neg Hx     Rectal Cancer Neg Hx     Stomach Cancer Neg Hx        REVIEW OF SYSTEMS:    Constitutional: Negative. HENT: Negative. Eyes: Negative. Respiratory: Negative. Cardiovascular: Negative. Gastrointestinal: Negative. Genitourinary: Negative. Musculoskeletal: Positive for back pain and joint pain. Skin: Negative. Neurological: Negative. Endo/Heme/Allergies: Negative. Psychiatric/Behavioral: Negative. Review of Systems was obtained by the medical assistant and reviewed by myself. PHYSICAL EXAM:    Vitals:    05/03/21 1334   BP: 123/60   Pulse: 68       Constitutional: Appears well-developed and well-nourished.    Eyes  conjunctiva normal.  Pupils react to light  Ear, nose,throat -Normal pinna and tragus, No scars, or lesions over external nose or ears, no obvious atrophy of tongue  Neck- symmetric, trachea midline, no jugular vein distension  Respiration- chest wall symmetric, normal effort without use of accessory muscles  Musculoskeletal  no significant wasting of muscles noted, no bony deformities, symmetric bulk  Extremities- no clubbing, cyanosis or edema  Skin  warm, dry, and intact. No rash,erythema, or pallor.   Psychiatric  mood, affect, and behavior appear normal.       NEUROLOGIC EXAM:    MENTAL STATUS: Alert, oriented, thought content appropriate    CRANIAL NERVES: PERRL, EOMI, symmetric facies, tongue midline    MOTOR:     Right Upper Extremity:    Deltoid: 5/5  Biceps: 5/5  Triceps: 5/5  Wrist Extension: 5/5  Finger Abduction: 5/5    Left Upper Extremity:    Deltoid: 5/5  Biceps: 5/5  Triceps: 5/5  Wrist Extension: 5/5  Finger Abduction: 5/5      Right Lower Extremity:    Hip Flexion: 5/5  Knee Extension: 5/5  Ankle Plantarflexion: 5/5  Ankle Dorsiflexion: 5/5      Left Lower Extremity:    Hip Flexion: 5/5  Knee Extension: 5/5  Ankle Plantarflexion: 5/5  Ankle Dorsiflexion: 5/5      SOMATOSENSORY:     Right Upper Extremity: normal light touch sensation  Left Upper Extremity: normal light touch sensation  Right Lower Extremity: normal light touch sensation  Left Lower Extremity: normal light touch sensation      REFLEXES:    Biceps: 2+  Patella: 2+    Rondon's: Negative      COORDINATION and GAIT:      Gait: Antalgic      DATA:    Lab Results   Component Value Date    WBC 6.7 12/17/2020    HGB 12.7 12/17/2020    HCT 39.9 12/17/2020    .3 (H) 12/17/2020     12/17/2020     Lab Results   Component Value Date     03/24/2021    K 4.9 03/24/2021     03/24/2021    CO2 25 03/24/2021    BUN 21 03/24/2021    CREATININE 1.0 (H) 03/24/2021    GLUCOSE 147 (H) 03/24/2021    CALCIUM 9.1 03/24/2021    PROT 6.7 03/24/2021    LABALBU 4.0 03/24/2021    BILITOT 0.3 03/24/2021    ALKPHOS 76 03/24/2021    AST 13 03/24/2021    ALT 18 03/24/2021    LABGLOM 54 (A) 03/24/2021 GFRAA >59 03/24/2021     Lab Results   Component Value Date    INR 0.95 12/05/2014    PROTIME 12.4 12/05/2014       No results found. IMAGING:    My interpretation of imaging studies:     MRI of the lumbar spine was reviewed, there are post-surgical changes with fusion from L2-S1 with interbody spacers and bilateral pedicle screws and rods at L3, L5 and S1. There is modest adjacent segment kyphosis above her fusion construct at L1/2 with a disc protrusion and facet arthropathy that contributes to moderate central canal and bilateral foraminal stenosis. MRI of the thoracic spine demonstrates multilevel spondylosis without any significant central canal stenosis in the thoracic spine.           ASSESSMENT AND PLAN:  This is a 65 y.o. female who returns to discuss implantation of a spinal cord stimulator for chronic lower back pain and radicular pain with multiple previous lumbar fusion surgeries. She describes excellent relief of both her back and leg pain during her stimulator trial.  She has no focal weakness or sensory loss in her lower extremities to suggest additional lumbar decompression or fusion would be more appropriate for her. 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 She voiced understanding and stated that she got such excellent pain relief during her stimulator trial that she wished to proceed with spinal cord stimulator placement. The stimulator implantation surgery (thoracic laminectomy of implantation of spinal cord stimulator lead and generator) was explained to her in detail, as were specific risks of bleeding, infection, neurologic injury/paralysis, mechanical device malfunction, lead migration, CSF leak, and need for additional surgeries were all discussed.   She voiced understanding and requested that we proceed. She is left handed and we will plan to implant her generator on the left side.             Adilene Bob MD

## 2021-05-03 NOTE — PROGRESS NOTES
[id number] [facility] Neurosurgery  Office Visit        Chief Complaint   Patient presents with    Follow-up     review imaging       HISTORY OF PRESENT ILLNESS:      Interval Update:  Planned follow up to review her recent thoracic MRI scan and discuss surgery to place a spinal cord stimulator. Her complaints are essentially unchanged. She continues to endorse back pain and leg pain that is exacerbated by activity and movement. She denies any lower extremity weakness or loss of bowel or bladder control. She again states that she got excellent pain relief during her stimulator trial.    HPI:  The patient is a 65 y.o. female who presents as a referral from Dr. Doroteo Bosworth office to discuss placement of a spinal cord stimulator. She has a longstanding history of back pain with multiple previous lumbar spine fusion surgeries. She is an established patient with Dr. Edison Russell having received injections in the past however the lumbar injections have become ineffective in controlling her chronic lower back pain and leg pain. She describes pain in her lower back that is worsened by activity. The pain is mostly back pain with some pain in her right hip and leg. She endorses some chronic tingling in the lateral aspect of her right leg but no numbness. She feels generally weak in her lower extremities but denies any focal weakness. She endorses chronic constipation but denies any difficulty with incontinence of bowel or bladder. She recently underwent a trial of spinal cord stimulation with Dr. Edison Russell and she states she got \"99%\" pain relief of her back and leg pain while the trial leads were in place. She presents with a recent lumbar MRI scan from 1/2021 but she has not had a recent MRI of her thoracic spine.       MEDICAL HISTORY:             Past Medical History:   Diagnosis Date    Carpal tunnel syndrome     Chronic back pain     Diabetes ([address]     [facility]) [facility])     Hyperlipidemia     Hypertension     Osteoarthritis     Pancreatitis     Tendonitis     Unspecified sleep apnea     Pt does not sleep with C-pap machine    Varicose vein 2014       Past Surgical History:   Procedure Laterality Date    APPENDECTOMY      BACK SURGERY      x2    CARPAL TUNNEL RELEASE      right hand     SECTION      X 3    CHOLECYSTECTOMY      COLONOSCOPY  08    COLONOSCOPY  10-    Dr Chery Morris: tubular adenoma    CYST REMOVAL      GANGLION CYST REMOVAL    ERCP  09    HYSTERECTOMY      TOTAL    JOINT REPLACEMENT Bilateral     Knee    KNEE ARTHROSCOPY      x2    MOUTH SURGERY      teeth pulled    NASAL SEPTUM SURGERY      NECK SURGERY      UPPER GASTROINTESTINAL ENDOSCOPY  3/7/07    UPPER GASTROINTESTINAL ENDOSCOPY  2011    Dr Chery Morris: gastritis, small hiatal hernia    VARICOSE VEIN SURGERY Right 14 SJS    GSV RF Ablation    VEIN SURGERY  1967    ? left leg vein stripping         Current Outpatient Medications:     diclofenac (VOLTAREN) 75 MG EC tablet, diclofenac sodium 75 mg tablet,delayed release, Disp: , Rfl:     pantoprazole (PROTONIX) 40 MG tablet, TAKE 1 TABLET BY MOUTH EVERY DAY BEFORE BREAKFAST, Disp: 30 tablet, Rfl: 5    ondansetron (ZOFRAN) 4 MG tablet, Take one tablet 1 hour prior to starting bowel prep. If nausea occurs take a second tablet.  Take 1-2 tablets every 6 hours for nausea., Disp: 15 tablet, Rfl: 0    tiZANidine (ZANAFLEX) 4 MG tablet, Take 4 mg by mouth every 8 hours as needed, Disp: , Rfl:     busPIRone (BUSPAR) 15 MG tablet, Take 15 mg by mouth daily as needed, Disp: , Rfl:     cloNIDine (CATAPRES) 0.1 MG tablet, Take 0.1 mg by mouth daily as needed for High Blood Pressure, Disp: , Rfl:     glimepiride (AMARYL) 2 MG tablet, daily as needed , Disp: , Rfl:     oxyCODONE-acetaminophen (PERCOCET)  MG per tablet, , Disp: , Rfl:     metFORMIN (GLUCOPHAGE) 500 MG tablet, Take 1,000 mg by mouth 2 times daily , Disp: , Rfl:     bisoprolol (ZEBETA) 5 MG tablet, Take 10 mg by mouth daily , Disp: , Rfl:     atorvastatin (LIPITOR) 20 MG tablet, Take 20 mg by mouth daily. , Disp: , Rfl:     DULoxetine HCl (CYMBALTA PO), Take 60 mg by mouth daily. , Disp: , Rfl:     Allergies:  Codeine, Amoxicillin-pot clavulanate, Best-c [ascorbate], Hctz, Hydrochlorothiazide, Irbesartan, Irbesartan-hydrochlorothiazide, Simvastatin, and Thiazide-type diuretics    Social History:   Social History     Tobacco Use   Smoking Status Current Every Day Smoker    Packs/day: 1.00    Years: 40.00    Pack years: 40.00    Types: Cigarettes   Smokeless Tobacco Never Used     Social History     Substance and Sexual Activity   Alcohol Use No         Family History:   Family History   Problem Relation Age of Onset    Heart Disease Mother     Kidney Disease Mother     Cancer Father         liver cancer    Liver Cancer Father     High Blood Pressure Sister     High Blood Pressure Brother     High Cholesterol Brother     Heart Disease Brother     Colon Cancer Neg Hx     Colon Polyps Neg Hx     Esophageal Cancer Neg Hx     Liver Disease Neg Hx     Rectal Cancer Neg Hx     Stomach Cancer Neg Hx        REVIEW OF SYSTEMS:    Constitutional: Negative. HENT: Negative. Eyes: Negative. Respiratory: Negative. Cardiovascular: Negative. Gastrointestinal: Negative. Genitourinary: Negative. Musculoskeletal: Positive for back pain and joint pain. Skin: Negative. Neurological: Negative. Endo/Heme/Allergies: Negative. Psychiatric/Behavioral: Negative. Review of Systems was obtained by the medical assistant and reviewed by myself. PHYSICAL EXAM:    Vitals:    05/03/21 1334   BP: 123/60   Pulse: 68       Constitutional: Appears well-developed and well-nourished.    Eyes  conjunctiva normal.  Pupils react to light  Ear, nose,throat -Normal pinna and tragus, No scars, or lesions over external nose or ears, no obvious atrophy of tongue  Neck- symmetric, trachea midline, no jugular vein distension  Respiration- chest wall symmetric, normal effort without use of accessory muscles  Musculoskeletal  no significant wasting of muscles noted, no bony deformities, symmetric bulk  Extremities- no clubbing, cyanosis or edema  Skin  warm, dry, and intact. No rash,erythema, or pallor.   Psychiatric  mood, affect, and behavior appear normal.       NEUROLOGIC EXAM:    MENTAL STATUS: Alert, oriented, thought content appropriate    CRANIAL NERVES: PERRL, EOMI, symmetric facies, tongue midline    MOTOR:     Right Upper Extremity:    Deltoid: 5/5  Biceps: 5/5  Triceps: 5/5  Wrist Extension: 5/5  Finger Abduction: 5/5    Left Upper Extremity:    Deltoid: 5/5  Biceps: 5/5  Triceps: 5/5  Wrist Extension: 5/5  Finger Abduction: 5/5      Right Lower Extremity:    Hip Flexion: 5/5  Knee Extension: 5/5  Ankle Plantarflexion: 5/5  Ankle Dorsiflexion: 5/5      Left Lower Extremity:    Hip Flexion: 5/5  Knee Extension: 5/5  Ankle Plantarflexion: 5/5  Ankle Dorsiflexion: 5/5      SOMATOSENSORY:     Right Upper Extremity: normal light touch sensation  Left Upper Extremity: normal light touch sensation  Right Lower Extremity: normal light touch sensation  Left Lower Extremity: normal light touch sensation      REFLEXES:    Biceps: 2+  Patella: 2+    Rondon's: Negative      COORDINATION and GAIT:      Gait: Antalgic      DATA:    Lab Results   Component Value Date    WBC 6.7 12/17/2020    HGB 12.7 12/17/2020    HCT 39.9 12/17/2020    .3 (H) 12/17/2020     12/17/2020     Lab Results   Component Value Date     03/24/2021    K 4.9 03/24/2021     03/24/2021    CO2 25 03/24/2021    BUN 21 03/24/2021    CREATININE 1.0 (H) 03/24/2021    GLUCOSE 147 (H) 03/24/2021    CALCIUM 9.1 03/24/2021    PROT 6.7 03/24/2021    LABALBU 4.0 03/24/2021    BILITOT 0.3 03/24/2021    ALKPHOS 76 03/24/2021    AST 13 03/24/2021    ALT 18 03/24/2021    LABGLOM 54 (A) 03/24/2021 GFRAA >59 03/24/2021     Lab Results   Component Value Date    INR 0.95 12/05/2014    PROTIME 12.4 12/05/2014       No results found. IMAGING:    My interpretation of imaging studies:     MRI of the lumbar spine was reviewed, there are post-surgical changes with fusion from L2-S1 with interbody spacers and bilateral pedicle screws and rods at L3, L5 and S1. There is modest adjacent segment kyphosis above her fusion construct at L1/2 with a disc protrusion and facet arthropathy that contributes to moderate central canal and bilateral foraminal stenosis. MRI of the thoracic spine demonstrates multilevel spondylosis without any significant central canal stenosis in the thoracic spine.           ASSESSMENT AND PLAN:  This is a 65 y.o. female who returns to discuss implantation of a spinal cord stimulator for chronic lower back pain and radicular pain with multiple previous lumbar fusion surgeries. She describes excellent relief of both her back and leg pain during her stimulator trial.  She has no focal weakness or sensory loss in her lower extremities to suggest additional lumbar decompression or fusion would be more appropriate for her. 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 She voiced understanding and stated that she got such excellent pain relief during her stimulator trial that she wished to proceed with spinal cord stimulator placement. The stimulator implantation surgery (thoracic laminectomy of implantation of spinal cord stimulator lead and generator) was explained to her in detail, as were specific risks of bleeding, infection, neurologic injury/paralysis, mechanical device malfunction, lead migration, CSF leak, and need for additional surgeries were all discussed.   She voiced understanding and requested that we proceed. She is left handed and we will plan to implant her generator on the left side.             Jolly Logan MD

## 2021-05-06 ENCOUNTER — HOSPITAL ENCOUNTER (OUTPATIENT)
Dept: PREADMISSION TESTING | Age: 71
Discharge: HOME | End: 2021-05-10
Payer: MEDICARE

## 2021-05-06 VITALS — WEIGHT: 180 LBS | HEIGHT: 64 IN | BODY MASS INDEX: 30.73 KG/M2

## 2021-05-06 LAB
ANION GAP SERPL CALCULATED.3IONS-SCNC: 11 MMOL/L (ref 7–19)
BASOPHILS ABSOLUTE: 0.1 K/UL (ref 0–0.2)
BASOPHILS RELATIVE PERCENT: 0.8 % (ref 0–1)
BUN BLDV-MCNC: 19 MG/DL (ref 8–23)
CALCIUM SERPL-MCNC: 8.8 MG/DL (ref 8.8–10.2)
CHLORIDE BLD-SCNC: 103 MMOL/L (ref 98–111)
CO2: 25 MMOL/L (ref 22–29)
CREAT SERPL-MCNC: 1 MG/DL (ref 0.5–0.9)
EOSINOPHILS ABSOLUTE: 0.2 K/UL (ref 0–0.6)
EOSINOPHILS RELATIVE PERCENT: 3.9 % (ref 0–5)
GFR AFRICAN AMERICAN: >59
GFR NON-AFRICAN AMERICAN: 54
GLUCOSE BLD-MCNC: 221 MG/DL (ref 74–109)
HCT VFR BLD CALC: 37.8 % (ref 37–47)
HEMOGLOBIN: 11.9 G/DL (ref 12–16)
IMMATURE GRANULOCYTES #: 0 K/UL
LYMPHOCYTES ABSOLUTE: 2.9 K/UL (ref 1.1–4.5)
LYMPHOCYTES RELATIVE PERCENT: 46.4 % (ref 20–40)
MCH RBC QN AUTO: 32.5 PG (ref 27–31)
MCHC RBC AUTO-ENTMCNC: 31.5 G/DL (ref 33–37)
MCV RBC AUTO: 103.3 FL (ref 81–99)
MONOCYTES ABSOLUTE: 0.4 K/UL (ref 0–0.9)
MONOCYTES RELATIVE PERCENT: 6.1 % (ref 0–10)
NEUTROPHILS ABSOLUTE: 2.6 K/UL (ref 1.5–7.5)
NEUTROPHILS RELATIVE PERCENT: 42.5 % (ref 50–65)
PDW BLD-RTO: 13.8 % (ref 11.5–14.5)
PLATELET # BLD: 232 K/UL (ref 130–400)
PMV BLD AUTO: 9.5 FL (ref 9.4–12.3)
POTASSIUM SERPL-SCNC: 5 MMOL/L (ref 3.5–5)
RBC # BLD: 3.66 M/UL (ref 4.2–5.4)
SODIUM BLD-SCNC: 139 MMOL/L (ref 136–145)
WBC # BLD: 6.2 K/UL (ref 4.8–10.8)

## 2021-05-06 PROCEDURE — 85025 COMPLETE CBC W/AUTO DIFF WBC: CPT

## 2021-05-06 PROCEDURE — 80048 BASIC METABOLIC PNL TOTAL CA: CPT

## 2021-05-06 PROCEDURE — 93005 ELECTROCARDIOGRAM TRACING: CPT | Performed by: ANESTHESIOLOGY

## 2021-05-06 RX ORDER — FLUTICASONE PROPIONATE 50 MCG
2 SPRAY, SUSPENSION (ML) NASAL DAILY
COMMUNITY

## 2021-05-06 RX ORDER — DOXAZOSIN MESYLATE 1 MG/1
2 TABLET ORAL NIGHTLY
COMMUNITY

## 2021-05-06 RX ORDER — OXYCODONE HYDROCHLORIDE AND ACETAMINOPHEN 5; 325 MG/1; MG/1
1 TABLET ORAL EVERY 8 HOURS PRN
Status: ON HOLD | COMMUNITY
End: 2021-05-14 | Stop reason: SDUPTHER

## 2021-05-06 RX ORDER — LATANOPROST 0.05 MG/ML
1 SOLUTION/ DROPS OPHTHALMIC; TOPICAL NIGHTLY
COMMUNITY

## 2021-05-06 RX ORDER — CHOLECALCIFEROL (VITAMIN D3) 1250 MCG
1 CAPSULE ORAL WEEKLY
COMMUNITY

## 2021-05-07 LAB
EKG P AXIS: 21 DEGREES
EKG P-R INTERVAL: 124 MS
EKG Q-T INTERVAL: 414 MS
EKG QRS DURATION: 86 MS
EKG QTC CALCULATION (BAZETT): 419 MS
EKG T AXIS: 48 DEGREES

## 2021-05-07 PROCEDURE — 93010 ELECTROCARDIOGRAM REPORT: CPT | Performed by: INTERNAL MEDICINE

## 2021-05-13 ENCOUNTER — HOSPITAL ENCOUNTER (OUTPATIENT)
Dept: PREADMISSION TESTING | Age: 71
Discharge: HOME | End: 2021-05-17
Payer: MEDICARE

## 2021-05-13 LAB — SARS-COV-2, NAAT: NOT DETECTED

## 2021-05-13 PROCEDURE — 87635 SARS-COV-2 COVID-19 AMP PRB: CPT

## 2021-05-14 ENCOUNTER — HOSPITAL ENCOUNTER (OUTPATIENT)
Age: 71
Setting detail: OUTPATIENT SURGERY
Discharge: HOME | End: 2021-05-14
Attending: NEUROLOGICAL SURGERY | Admitting: NEUROLOGICAL SURGERY
Payer: MEDICARE

## 2021-05-14 ENCOUNTER — ANESTHESIA (OUTPATIENT)
Dept: OPERATING ROOM | Age: 71
End: 2021-05-14
Payer: MEDICARE

## 2021-05-14 ENCOUNTER — APPOINTMENT (OUTPATIENT)
Dept: GENERAL RADIOLOGY | Age: 71
End: 2021-05-14
Attending: NEUROLOGICAL SURGERY
Payer: MEDICARE

## 2021-05-14 ENCOUNTER — ANESTHESIA EVENT (OUTPATIENT)
Dept: OPERATING ROOM | Age: 71
End: 2021-05-14
Payer: MEDICARE

## 2021-05-14 VITALS — SYSTOLIC BLOOD PRESSURE: 141 MMHG | OXYGEN SATURATION: 100 % | TEMPERATURE: 96.6 F | DIASTOLIC BLOOD PRESSURE: 65 MMHG

## 2021-05-14 VITALS
BODY MASS INDEX: 30.73 KG/M2 | DIASTOLIC BLOOD PRESSURE: 78 MMHG | HEIGHT: 64 IN | TEMPERATURE: 98.1 F | SYSTOLIC BLOOD PRESSURE: 165 MMHG | WEIGHT: 180 LBS | RESPIRATION RATE: 16 BRPM | HEART RATE: 72 BPM | OXYGEN SATURATION: 100 %

## 2021-05-14 DIAGNOSIS — M96.1 FAILED BACK SYNDROME OF LUMBAR SPINE: Primary | ICD-10-CM

## 2021-05-14 DIAGNOSIS — M54.16 LUMBAR RADICULOPATHY: ICD-10-CM

## 2021-05-14 LAB
GLUCOSE BLD-MCNC: 181 MG/DL (ref 70–99)
PERFORMED ON: ABNORMAL

## 2021-05-14 PROCEDURE — 3700000000 HC ANESTHESIA ATTENDED CARE: Performed by: NEUROLOGICAL SURGERY

## 2021-05-14 PROCEDURE — 72070 X-RAY EXAM THORAC SPINE 2VWS: CPT

## 2021-05-14 PROCEDURE — 7100000011 HC PHASE II RECOVERY - ADDTL 15 MIN: Performed by: NEUROLOGICAL SURGERY

## 2021-05-14 PROCEDURE — 6370000000 HC RX 637 (ALT 250 FOR IP): Performed by: NEUROLOGICAL SURGERY

## 2021-05-14 PROCEDURE — 7100000000 HC PACU RECOVERY - FIRST 15 MIN: Performed by: NEUROLOGICAL SURGERY

## 2021-05-14 PROCEDURE — 2720000010 HC SURG SUPPLY STERILE: Performed by: NEUROLOGICAL SURGERY

## 2021-05-14 PROCEDURE — C1820 GENERATOR NEURO RECHG BAT SY: HCPCS | Performed by: NEUROLOGICAL SURGERY

## 2021-05-14 PROCEDURE — 2580000003 HC RX 258: Performed by: NEUROLOGICAL SURGERY

## 2021-05-14 PROCEDURE — 7100000010 HC PHASE II RECOVERY - FIRST 15 MIN: Performed by: NEUROLOGICAL SURGERY

## 2021-05-14 PROCEDURE — 6360000002 HC RX W HCPCS: Performed by: NURSE ANESTHETIST, CERTIFIED REGISTERED

## 2021-05-14 PROCEDURE — 3209999900 FLUORO FOR SURGICAL PROCEDURES

## 2021-05-14 PROCEDURE — 63655 IMPLANT NEUROELECTRODES: CPT | Performed by: NEUROLOGICAL SURGERY

## 2021-05-14 PROCEDURE — 82947 ASSAY GLUCOSE BLOOD QUANT: CPT

## 2021-05-14 PROCEDURE — 2500000003 HC RX 250 WO HCPCS: Performed by: NEUROLOGICAL SURGERY

## 2021-05-14 PROCEDURE — 2500000003 HC RX 250 WO HCPCS: Performed by: NURSE ANESTHETIST, CERTIFIED REGISTERED

## 2021-05-14 PROCEDURE — 2780000010 HC IMPLANT OTHER: Performed by: NEUROLOGICAL SURGERY

## 2021-05-14 PROCEDURE — 3700000001 HC ADD 15 MINUTES (ANESTHESIA): Performed by: NEUROLOGICAL SURGERY

## 2021-05-14 PROCEDURE — 3600000014 HC SURGERY LEVEL 4 ADDTL 15MIN: Performed by: NEUROLOGICAL SURGERY

## 2021-05-14 PROCEDURE — 63685 INS/RPLC SPI NPG/RCVR POCKET: CPT | Performed by: NEUROLOGICAL SURGERY

## 2021-05-14 PROCEDURE — 7100000001 HC PACU RECOVERY - ADDTL 15 MIN: Performed by: NEUROLOGICAL SURGERY

## 2021-05-14 PROCEDURE — 3600000004 HC SURGERY LEVEL 4 BASE: Performed by: NEUROLOGICAL SURGERY

## 2021-05-14 PROCEDURE — 2709999900 HC NON-CHARGEABLE SUPPLY: Performed by: NEUROLOGICAL SURGERY

## 2021-05-14 DEVICE — DEVICE NEUROSTIMULATOR 13.9CC W1.9XH2.2IN 29.1GM RECHRG: Type: IMPLANTABLE DEVICE | Site: SPINE THORACIC | Status: FUNCTIONAL

## 2021-05-14 DEVICE — IMPLANTABLE DEVICE: Type: IMPLANTABLE DEVICE | Site: SPINE THORACIC | Status: FUNCTIONAL

## 2021-05-14 RX ORDER — SODIUM CHLORIDE 9 MG/ML
INJECTION, SOLUTION INTRAVENOUS CONTINUOUS
Status: DISCONTINUED | OUTPATIENT
Start: 2021-05-14 | End: 2021-05-14 | Stop reason: HOSPADM

## 2021-05-14 RX ORDER — MIDAZOLAM HYDROCHLORIDE 1 MG/ML
2 INJECTION INTRAMUSCULAR; INTRAVENOUS
Status: DISCONTINUED | OUTPATIENT
Start: 2021-05-14 | End: 2021-05-14 | Stop reason: HOSPADM

## 2021-05-14 RX ORDER — OXYCODONE HYDROCHLORIDE AND ACETAMINOPHEN 5; 325 MG/1; MG/1
2 TABLET ORAL
Status: COMPLETED | OUTPATIENT
Start: 2021-05-14 | End: 2021-05-14

## 2021-05-14 RX ORDER — DIPHENHYDRAMINE HYDROCHLORIDE 50 MG/ML
12.5 INJECTION INTRAMUSCULAR; INTRAVENOUS
Status: DISCONTINUED | OUTPATIENT
Start: 2021-05-14 | End: 2021-05-14 | Stop reason: HOSPADM

## 2021-05-14 RX ORDER — BUPIVACAINE HYDROCHLORIDE AND EPINEPHRINE 5; 5 MG/ML; UG/ML
INJECTION, SOLUTION PERINEURAL PRN
Status: DISCONTINUED | OUTPATIENT
Start: 2021-05-14 | End: 2021-05-14 | Stop reason: ALTCHOICE

## 2021-05-14 RX ORDER — MORPHINE SULFATE 4 MG/ML
2 INJECTION, SOLUTION INTRAMUSCULAR; INTRAVENOUS EVERY 5 MIN PRN
Status: DISCONTINUED | OUTPATIENT
Start: 2021-05-14 | End: 2021-05-14 | Stop reason: HOSPADM

## 2021-05-14 RX ORDER — SODIUM CHLORIDE 9 MG/ML
25 INJECTION, SOLUTION INTRAVENOUS PRN
Status: DISCONTINUED | OUTPATIENT
Start: 2021-05-14 | End: 2021-05-14 | Stop reason: HOSPADM

## 2021-05-14 RX ORDER — MORPHINE SULFATE 4 MG/ML
4 INJECTION, SOLUTION INTRAMUSCULAR; INTRAVENOUS EVERY 5 MIN PRN
Status: DISCONTINUED | OUTPATIENT
Start: 2021-05-14 | End: 2021-05-14 | Stop reason: HOSPADM

## 2021-05-14 RX ORDER — MEPERIDINE HYDROCHLORIDE 50 MG/ML
12.5 INJECTION INTRAMUSCULAR; INTRAVENOUS; SUBCUTANEOUS EVERY 5 MIN PRN
Status: DISCONTINUED | OUTPATIENT
Start: 2021-05-14 | End: 2021-05-14 | Stop reason: HOSPADM

## 2021-05-14 RX ORDER — METOCLOPRAMIDE HYDROCHLORIDE 5 MG/ML
10 INJECTION INTRAMUSCULAR; INTRAVENOUS
Status: DISCONTINUED | OUTPATIENT
Start: 2021-05-14 | End: 2021-05-14 | Stop reason: HOSPADM

## 2021-05-14 RX ORDER — PROPOFOL 10 MG/ML
INJECTION, EMULSION INTRAVENOUS PRN
Status: DISCONTINUED | OUTPATIENT
Start: 2021-05-14 | End: 2021-05-14 | Stop reason: SDUPTHER

## 2021-05-14 RX ORDER — LABETALOL HYDROCHLORIDE 5 MG/ML
5 INJECTION, SOLUTION INTRAVENOUS EVERY 10 MIN PRN
Status: DISCONTINUED | OUTPATIENT
Start: 2021-05-14 | End: 2021-05-14 | Stop reason: HOSPADM

## 2021-05-14 RX ORDER — KETOROLAC TROMETHAMINE 30 MG/ML
INJECTION, SOLUTION INTRAMUSCULAR; INTRAVENOUS PRN
Status: DISCONTINUED | OUTPATIENT
Start: 2021-05-14 | End: 2021-05-14 | Stop reason: SDUPTHER

## 2021-05-14 RX ORDER — HYDROMORPHONE HYDROCHLORIDE 1 MG/ML
0.25 INJECTION, SOLUTION INTRAMUSCULAR; INTRAVENOUS; SUBCUTANEOUS EVERY 5 MIN PRN
Status: DISCONTINUED | OUTPATIENT
Start: 2021-05-14 | End: 2021-05-14 | Stop reason: HOSPADM

## 2021-05-14 RX ORDER — ROCURONIUM BROMIDE 10 MG/ML
INJECTION, SOLUTION INTRAVENOUS PRN
Status: DISCONTINUED | OUTPATIENT
Start: 2021-05-14 | End: 2021-05-14 | Stop reason: SDUPTHER

## 2021-05-14 RX ORDER — FENTANYL CITRATE 50 UG/ML
INJECTION, SOLUTION INTRAMUSCULAR; INTRAVENOUS PRN
Status: DISCONTINUED | OUTPATIENT
Start: 2021-05-14 | End: 2021-05-14 | Stop reason: SDUPTHER

## 2021-05-14 RX ORDER — SODIUM CHLORIDE, SODIUM LACTATE, POTASSIUM CHLORIDE, CALCIUM CHLORIDE 600; 310; 30; 20 MG/100ML; MG/100ML; MG/100ML; MG/100ML
INJECTION, SOLUTION INTRAVENOUS CONTINUOUS
Status: DISCONTINUED | OUTPATIENT
Start: 2021-05-14 | End: 2021-05-14 | Stop reason: HOSPADM

## 2021-05-14 RX ORDER — FENTANYL CITRATE 50 UG/ML
50 INJECTION, SOLUTION INTRAMUSCULAR; INTRAVENOUS
Status: DISCONTINUED | OUTPATIENT
Start: 2021-05-14 | End: 2021-05-14 | Stop reason: HOSPADM

## 2021-05-14 RX ORDER — EPHEDRINE SULFATE 50 MG/ML
INJECTION, SOLUTION INTRAVENOUS PRN
Status: DISCONTINUED | OUTPATIENT
Start: 2021-05-14 | End: 2021-05-14 | Stop reason: SDUPTHER

## 2021-05-14 RX ORDER — SODIUM CHLORIDE 0.9 % (FLUSH) 0.9 %
5-40 SYRINGE (ML) INJECTION EVERY 12 HOURS SCHEDULED
Status: DISCONTINUED | OUTPATIENT
Start: 2021-05-14 | End: 2021-05-14 | Stop reason: HOSPADM

## 2021-05-14 RX ORDER — DICLOFENAC SODIUM 75 MG/1
75 TABLET, DELAYED RELEASE ORAL 2 TIMES DAILY
Qty: 1 TABLET | Refills: 0
Start: 2021-05-17

## 2021-05-14 RX ORDER — SODIUM CHLORIDE 0.9 % (FLUSH) 0.9 %
5-40 SYRINGE (ML) INJECTION PRN
Status: DISCONTINUED | OUTPATIENT
Start: 2021-05-14 | End: 2021-05-14 | Stop reason: HOSPADM

## 2021-05-14 RX ORDER — HYDRALAZINE HYDROCHLORIDE 20 MG/ML
5 INJECTION INTRAMUSCULAR; INTRAVENOUS EVERY 10 MIN PRN
Status: DISCONTINUED | OUTPATIENT
Start: 2021-05-14 | End: 2021-05-14 | Stop reason: HOSPADM

## 2021-05-14 RX ORDER — OXYCODONE HYDROCHLORIDE AND ACETAMINOPHEN 5; 325 MG/1; MG/1
1-2 TABLET ORAL EVERY 4 HOURS PRN
Qty: 36 TABLET | Refills: 0 | Status: SHIPPED | OUTPATIENT
Start: 2021-05-14 | End: 2021-05-17

## 2021-05-14 RX ORDER — FENTANYL CITRATE 50 UG/ML
25 INJECTION, SOLUTION INTRAMUSCULAR; INTRAVENOUS
Status: DISCONTINUED | OUTPATIENT
Start: 2021-05-14 | End: 2021-05-14 | Stop reason: HOSPADM

## 2021-05-14 RX ORDER — KETAMINE HYDROCHLORIDE 50 MG/ML
INJECTION, SOLUTION, CONCENTRATE INTRAMUSCULAR; INTRAVENOUS PRN
Status: DISCONTINUED | OUTPATIENT
Start: 2021-05-14 | End: 2021-05-14 | Stop reason: SDUPTHER

## 2021-05-14 RX ORDER — LIDOCAINE HYDROCHLORIDE 10 MG/ML
1 INJECTION, SOLUTION EPIDURAL; INFILTRATION; INTRACAUDAL; PERINEURAL
Status: DISCONTINUED | OUTPATIENT
Start: 2021-05-14 | End: 2021-05-14 | Stop reason: HOSPADM

## 2021-05-14 RX ORDER — LIDOCAINE HYDROCHLORIDE 10 MG/ML
INJECTION, SOLUTION INFILTRATION; PERINEURAL PRN
Status: DISCONTINUED | OUTPATIENT
Start: 2021-05-14 | End: 2021-05-14 | Stop reason: SDUPTHER

## 2021-05-14 RX ORDER — HYDROMORPHONE HYDROCHLORIDE 1 MG/ML
0.5 INJECTION, SOLUTION INTRAMUSCULAR; INTRAVENOUS; SUBCUTANEOUS EVERY 5 MIN PRN
Status: DISCONTINUED | OUTPATIENT
Start: 2021-05-14 | End: 2021-05-14 | Stop reason: HOSPADM

## 2021-05-14 RX ORDER — ONDANSETRON 2 MG/ML
INJECTION INTRAMUSCULAR; INTRAVENOUS PRN
Status: DISCONTINUED | OUTPATIENT
Start: 2021-05-14 | End: 2021-05-14 | Stop reason: SDUPTHER

## 2021-05-14 RX ORDER — DEXAMETHASONE SODIUM PHOSPHATE 10 MG/ML
INJECTION, SOLUTION INTRAMUSCULAR; INTRAVENOUS PRN
Status: DISCONTINUED | OUTPATIENT
Start: 2021-05-14 | End: 2021-05-14 | Stop reason: SDUPTHER

## 2021-05-14 RX ORDER — LIDOCAINE HYDROCHLORIDE 20 MG/ML
INJECTION, SOLUTION EPIDURAL; INFILTRATION; INTRACAUDAL; PERINEURAL PRN
Status: DISCONTINUED | OUTPATIENT
Start: 2021-05-14 | End: 2021-05-14 | Stop reason: ALTCHOICE

## 2021-05-14 RX ORDER — PROMETHAZINE HYDROCHLORIDE 25 MG/ML
6.25 INJECTION, SOLUTION INTRAMUSCULAR; INTRAVENOUS
Status: DISCONTINUED | OUTPATIENT
Start: 2021-05-14 | End: 2021-05-14 | Stop reason: HOSPADM

## 2021-05-14 RX ADMIN — ROCURONIUM BROMIDE 40 MG: 10 INJECTION, SOLUTION INTRAVENOUS at 07:38

## 2021-05-14 RX ADMIN — SODIUM CHLORIDE, SODIUM LACTATE, POTASSIUM CHLORIDE, AND CALCIUM CHLORIDE: 600; 310; 30; 20 INJECTION, SOLUTION INTRAVENOUS at 06:24

## 2021-05-14 RX ADMIN — DEXAMETHASONE SODIUM PHOSPHATE 10 MG: 10 INJECTION, SOLUTION INTRAMUSCULAR; INTRAVENOUS at 07:48

## 2021-05-14 RX ADMIN — EPHEDRINE SULFATE 10 MG: 50 INJECTION INTRAMUSCULAR; INTRAVENOUS; SUBCUTANEOUS at 07:55

## 2021-05-14 RX ADMIN — KETOROLAC TROMETHAMINE 10 MG: 30 INJECTION, SOLUTION INTRAMUSCULAR; INTRAVENOUS at 09:18

## 2021-05-14 RX ADMIN — Medication 25 MG: at 07:38

## 2021-05-14 RX ADMIN — FENTANYL CITRATE 50 MCG: 50 INJECTION, SOLUTION INTRAMUSCULAR; INTRAVENOUS at 09:14

## 2021-05-14 RX ADMIN — SODIUM CHLORIDE, SODIUM LACTATE, POTASSIUM CHLORIDE, AND CALCIUM CHLORIDE: 600; 310; 30; 20 INJECTION, SOLUTION INTRAVENOUS at 09:18

## 2021-05-14 RX ADMIN — Medication 10 MG: at 08:38

## 2021-05-14 RX ADMIN — EPHEDRINE SULFATE 10 MG: 50 INJECTION INTRAMUSCULAR; INTRAVENOUS; SUBCUTANEOUS at 09:10

## 2021-05-14 RX ADMIN — ROCURONIUM BROMIDE 10 MG: 10 INJECTION, SOLUTION INTRAVENOUS at 08:14

## 2021-05-14 RX ADMIN — OXYCODONE HYDROCHLORIDE AND ACETAMINOPHEN 2 TABLET: 5; 325 TABLET ORAL at 10:59

## 2021-05-14 RX ADMIN — SUGAMMADEX 160 MG: 100 INJECTION, SOLUTION INTRAVENOUS at 09:31

## 2021-05-14 RX ADMIN — FENTANYL CITRATE 100 MCG: 50 INJECTION, SOLUTION INTRAMUSCULAR; INTRAVENOUS at 08:14

## 2021-05-14 RX ADMIN — LIDOCAINE HYDROCHLORIDE 50 MG: 10 INJECTION, SOLUTION INFILTRATION; PERINEURAL at 07:38

## 2021-05-14 RX ADMIN — PROPOFOL 140 MG: 10 INJECTION, EMULSION INTRAVENOUS at 07:38

## 2021-05-14 RX ADMIN — FENTANYL CITRATE 50 MCG: 50 INJECTION, SOLUTION INTRAMUSCULAR; INTRAVENOUS at 07:38

## 2021-05-14 RX ADMIN — ONDANSETRON HYDROCHLORIDE 4 MG: 2 INJECTION, SOLUTION INTRAMUSCULAR; INTRAVENOUS at 09:18

## 2021-05-14 ASSESSMENT — PAIN SCALES - GENERAL
PAINLEVEL_OUTOF10: 5
PAINLEVEL_OUTOF10: 5
PAINLEVEL_OUTOF10: 4

## 2021-05-14 ASSESSMENT — ENCOUNTER SYMPTOMS: SHORTNESS OF BREATH: 0

## 2021-05-14 ASSESSMENT — PAIN DESCRIPTION - DESCRIPTORS: DESCRIPTORS: ACHING;SORE

## 2021-05-14 ASSESSMENT — PAIN DESCRIPTION - LOCATION: LOCATION: BACK

## 2021-05-14 ASSESSMENT — PAIN DESCRIPTION - PAIN TYPE
TYPE: SURGICAL PAIN
TYPE: SURGICAL PAIN

## 2021-05-14 ASSESSMENT — LIFESTYLE VARIABLES: SMOKING_STATUS: 1

## 2021-05-14 ASSESSMENT — PAIN DESCRIPTION - ORIENTATION: ORIENTATION: MID

## 2021-05-14 NOTE — ANESTHESIA PRE PROCEDURE
daily as needed for High Blood Pressure    Historical Provider, MD       Current medications:    Current Facility-Administered Medications   Medication Dose Route Frequency Provider Last Rate Last Admin    lactated ringers infusion   Intravenous Continuous Chrissie Milligan  mL/hr at 21 0624 New Bag at 21 [id number]       Allergies:     Allergies   Allergen Reactions    Codeine Itching    Amoxicillin-Pot Clavulanate Nausea Only     Other reaction(s): Nausea And Vomiting, Vomiting    Best-C [Ascorbate]     Hctz     Hydrochlorothiazide     Irbesartan      Other reaction(s): Headache, Unknown (See Comments)  PANCREATITIS       Irbesartan-Hydrochlorothiazide      Other reaction(s): Unknown (See Comments)  PANCREATITIS      Simvastatin      Other reaction(s): Myalgia    Thiazide-Type Diuretics      Other reaction(s): Unknown (See Comments)  PANCRAETITIS        Problem List:    Patient Active Problem List   Diagnosis Code    Gastroesophageal reflux disease K21.9    Constipation, chronic K59.09    Colon polyps K63.5    History of colon polyps Z86.010    Bloating R14.0    Hemorrhoids K64.9    Chronic back pain M54.9, G89.29    HTN (hypertension) I10    Varicose vein I83.90    Leg pain, bilateral M79.604, M79.605    Venous insufficiency of both lower extremities I87.2    Irritable bowel syndrome with constipation K58.1    Narcotic dependence (HCC) F11.20    Abdominal cramping R10.9    Productive cough R05    History of adenomatous polyp of colon Z86.010    Acute pancreatitis K85.90    Pancreatitis, recurrent K85.90    Duodenitis K29.80    Failed back syndrome of lumbar spine M96.1    Lumbar radiculopathy M54.16       Past Medical History:        Diagnosis Date    Carpal tunnel syndrome     Chronic back pain     Diabetes ([facility] [address]     D[facility] thrombosis) ([facility])     History of blood transfusion     Hx of blood clots     DVT-POST OP  SECTION, DVT X 2 SINCE THEN    Last 3 Encounters:   05/14/21 180 lb (81.6 kg)   05/06/21 180 lb (81.6 kg)   05/03/21 180 lb (81.6 kg)     Body mass index is 31.39 kg/m². CBC:   Lab Results   Component Value Date    WBC 6.2 05/06/2021    RBC 3.66 05/06/2021    HGB 11.9 05/06/2021    HCT 37.8 05/06/2021    .3 05/06/2021    RDW 13.8 05/06/2021     05/06/2021       CMP:   Lab Results   Component Value Date     05/06/2021    K 5.0 05/06/2021    K 4.3 09/30/2020     05/06/2021    CO2 25 05/06/2021    BUN 19 05/06/2021    CREATININE 1.0 05/06/2021    GFRAA >59 05/06/2021    LABGLOM 54 05/06/2021    GLUCOSE 221 05/06/2021    PROT 6.7 03/24/2021    CALCIUM 8.8 05/06/2021    BILITOT 0.3 03/24/2021    ALKPHOS 76 03/24/2021    AST 13 03/24/2021    ALT 18 03/24/2021       POC Tests: No results for input(s): POCGLU, POCNA, POCK, POCCL, POCBUN, POCHEMO, POCHCT in the last 72 hours.     Coags:   Lab Results   Component Value Date    PROTIME 12.4 12/05/2014    INR 0.95 12/05/2014       HCG (If Applicable): No results found for: PREGTESTUR, PREGSERUM, HCG, HCGQUANT     ABGs:   Lab Results   Component Value Date    PHART 7.430 01/02/2020    PO2ART 92.0 01/02/2020    XMY4XJJ 30.0 01/02/2020    RHO5LWL 19.9 01/02/2020    BEART -3.3 01/02/2020    U0PONVCM 94.9 01/02/2020        Type & Screen (If Applicable):  No results found for: LABABO, [address]    Drug/Infectious Status (If Applicable):  No results found for: HIV, HEPCAB    COVID-19 Screening (If Applicable):   Lab Results   Component Value Date    COVID19 Not Detected 05/13/2021    COVID19 NOT DETECTED 11/16/2020    COVID19 Not Detected 09/30/2020           Anesthesia Evaluation  Patient summary reviewed and Nursing notes reviewed no history of anesthetic complications:   Airway: Mallampati: II  TM distance: >3 FB   Neck ROM: full  Mouth opening: > = 3 FB Dental:    (+) upper dentures and lower dentures      Pulmonary:   (+) sleep apnea: on noncompliant,  current smoker    (-) shortness of

## 2021-05-14 NOTE — OP NOTE
NEUROSURGICAL OPERATIVE REPORT     ATTENDING SURGEON:  Palmer Epley. Silver Christopher MD, PhD     PREOPERATIVE DIAGNOSIS:  Intractable back pain. POSTOPERATIVE DIAGNOSIS:  Intractable back pain. PROCEDURE PERFORMED:  1. Thoracic laminectomy for implantation of [employer] spinal cord stimulator. 2.  Intraoperative fluoroscopy with interpretation. 3.  Intraoperative neuromonitoring with SSEP   ESTIMATED BLOOD LOSS:  <50 mL. INDICATIONS:  Please see the medical record for full details. Briefly, the patient is an 67 y/o with a longstanding history of chronic back pain. She has undergone spinal surgery but her pain persists. She is followed by Pain Management and underwent a trial of dorsal column stimulation, which produced excellent pain relief. She was referred for neurosurgical evaluation and was deemed to be an appropriate candidate for permanent stimulator implant placement, and she requested that we proceed with the procedure. The risks, benefits, and potential complications were explained to the patient in detail and he provided his consent to proceed. PROCEDURE IN DETAIL:  The patient was identified in the preoperative area, consent for surgery was confirmed, and the site for surgery was marked. She was transferred to the operating room by the Anesthesia team where general endotracheal anesthesia was induced without difficulty. She was then flipped into the prone position on Lino frame and all pressure points were carefully padded. Fluoroscopy was used to localize the T10 level and a midline incision was planned. A second incision was also planned just under the beltline in the right gluteal area. The entire area was then prepped and draped in the usual aseptic fashion. A team pause was held according to the preformatted script, all were in agreement, and the decision was made to proceed with surgery.      Beginning with the thoracic incision, the incision was opened with the skin knife and carried down to the subcutaneous tissues with electrocautery. Monopolar cautery and Coleman elevators were used to elevate paraspinous musculature off the spinous processes of T9, T10, and T11. Self-retaining retractors were then placed and we placed a Kocher clamp on the spinous process and confirmed that we were at the intended T10 level. Using the Leksell rongeur, the spinous process of T10 was removed. The Leksell rongeur and matchstick eric on the high-speed drill were then used to eggshell the bone of the T10 lamina. Kerrison punches were then used to complete the laminectomy taking great care to protect the underlying spinal cord. Once the ligamentum flavum was completely removed and we had adequate exposure, we obtained the dissecting paddle from the [employer] set and it was passed rostrally under the T9 lamina and its position was confirmed with fluoroscopy. We then obtained a [employer] MRI-compatible lead. This was handed onto the field and carefully threaded under the T9 lamina and its position was confirmed with fluoroscopy. Silastic fascial anchors were threaded over the lead wires and left in place. The leads were tacked to the ligamentum flavum with a single 2-O silk suture. Once we had the lead in place, we obtained hemostasis in the epidural space using a combination of bone wax, Surgifoam, and bipolar cautery. Once we had hemostasis, we then turned our attention to the gluteal incision. The skin was incised with a #15 blade and then combination of blunt dissection and electrocautery were used to create an appropriately sized pocket. We then used a tunneling sheath to tunnel from the thoracic incision to the gluteal incision, and the lead terminals were passed through the tunneling sheath to the gluteal incision. Once the leads were tunneled, we obtained a confirmatory fluoroscopic image and were satisfied with the placement of the lead.     We relaxed our retraction, we confirmed hemostasis, and then closed the thoracic incision in layers with absorbable sutures. Once the thoracic incision was closed, we obtained a final fluoroscopic image and nothing had shifted during closure, and we were satisfied with the electrode placement. Once the electrode was in place in the thoracic spine, we then obtained a [employer] MRI-compatible impulse generator and the leads were then connected in the appropriate  orientation and secured in place with a screwdriver until the torque limit was reached. Once the leads were locked in place, the redundant lead was coiled behind the impulse generator and it was placed in the pocket in the appropriate orientation. Telemetry was performed and we noted nominal lead impedances throughout the system. The stimulator was then activated and neuromonitoring noted bilateral interference with SSEP waveforms. With nominal impedances and successful interference testing, we then irrigated the gluteal incision and it was closed with absorbable sutures. Both incisions were then cleaned and dried and sealed with Dermabond skin glue. Once the Dermabond had dried, the drapes were removed and the patient was flipped into the supine position and control of the operation was returned to Anesthesia for extubation and transport to recovery. All sponge, needle, and instrument counts were correct on 2 durations at the end of the procedure. There were no apparent complications.

## 2021-05-14 NOTE — ANESTHESIA POSTPROCEDURE EVALUATION
Department of Anesthesiology  Postprocedure Note    Patient: Amanda Jolly  MRN: [id number]  [place]: 1950  Date of evaluation: 5/14/2021  Time:  9:51 AM     Procedure Summary     Date: 05/14/21 Room / Location: [address]    Anesthesia Start: 6726 Anesthesia Stop: 7999    Procedure: THORACIC LAMINECTOMY FOR SPINAL CORD STIM WITH NEUROMONITORING (N/A ) Diagnosis: (BACK PAIN)    Surgeons: Ismael Harris MD Responsible Provider: GALI Beaulieu CRNA    Anesthesia Type: general ASA Status: 3          Anesthesia Type: general    Akhil Phase I: Akhil Score: 7    Akhil Phase II:      Last vitals: Reviewed and per EMR flowsheets.        Anesthesia Post Evaluation    Patient location during evaluation: PACU  Patient participation: complete - patient participated  Level of consciousness: sleepy but conscious  Pain score: 0  Airway patency: patent  Nausea & Vomiting: no nausea and no vomiting  Complications: no  Cardiovascular status: hemodynamically stable  Respiratory status: acceptable and nasal cannula  Hydration status: euvolemic

## 2021-05-27 ENCOUNTER — OFFICE VISIT (OUTPATIENT)
Dept: NEUROSURGERY | Age: 71
End: 2021-05-27

## 2021-05-27 VITALS
HEIGHT: 64 IN | SYSTOLIC BLOOD PRESSURE: 135 MMHG | DIASTOLIC BLOOD PRESSURE: 69 MMHG | BODY MASS INDEX: 30.73 KG/M2 | WEIGHT: 180 LBS | HEART RATE: 64 BPM

## 2021-05-27 DIAGNOSIS — M79.662 PAIN OF LEFT CALF: ICD-10-CM

## 2021-05-27 DIAGNOSIS — Z96.89 S/P INSERTION OF SPINAL CORD STIMULATOR: Primary | ICD-10-CM

## 2021-05-27 PROCEDURE — 99024 POSTOP FOLLOW-UP VISIT: CPT | Performed by: NEUROLOGICAL SURGERY

## 2021-05-27 NOTE — PROGRESS NOTES
NEUROSURGERY POSTOPERATIVE FOLLOW UP NOTE      Chief Complaint:   Chief Complaint   Patient presents with    Follow-up     wound check for throacic laminectomy. Date of Surgery: 5/14/2021    Procedure Performed:  Thoracic laminectomy for implantation of spinal cord stimulator and generator      Interval Update:  First postoperative follow up since surgery. Overall she is doing well. Her main complaint today is pain in her left calf and she is worried that she may have developed a blood clot. Her stimulator was activated last Tuesday and since activation she notes that stimulation is stronger in one leg than the other. She denies any new numbness, tingling or extremity weakness. She denies any incisional concerns. HPI: The patient is a 64 y. o. female who presents as a referral from Dr. Birgit Perkins office to discuss placement of a spinal cord stimulator.  She has a longstanding history of back pain with multiple previous lumbar spine fusion surgeries. Serena Matute is an established patient with Dr. Radha Randle having received injections in the past however the lumbar injections have become ineffective in controlling her chronic lower back pain and leg pain.  She describes pain in her lower back that is worsened by activity.  The pain is mostly back pain with some pain in her right hip and leg.  She endorses some chronic tingling in the lateral aspect of her right leg but no numbness.  She feels generally weak in her lower extremities but denies any focal weakness.  She endorses chronic constipation but denies any difficulty with incontinence of bowel or bladder.  She recently underwent a trial of spinal cord stimulation with Dr. Radha Randle and she states she got \"99%\" pain relief of her back and leg pain while the trial leads were in place.  She presents with a recent lumbar MRI scan from 1/2021 and a thoracic MRI was obtained that did not show any significant spinal stenosis.   The stimulator was implanted on 5/14/2021. Objective:    /69   Pulse 64   Ht 5' 3.5\" (1.613 m)   Wt 180 lb (81.6 kg)   BMI 31.39 kg/m²         Physical Exam:    General: alert, cooperative, no distress  Cardiorespiratory: unlabored breathing  Wound: Thoracic and R gluteal incisions healing well. No erythema, fluctuance or drainage. Neurologic Exam:    Mental Status: Alert, oriented, thought content appropriate  Cranial Nerves: PERRL, EOMI, symmetric facies, tongue midline  Motor: Motor exam is symmetrical 5 out of 5 all extremities bilaterally  Somatosensory: normal light touch sensation  Extremities:  No obvious lower extremity edema. There is tenderness to palpation of the left gastroc. Some pain with forced dorsiflexion of the left ankle, none on right. Assessment and Plan:  67 y/o F returns for follow up after thoracic laminectomy and implantation of a spinal cord stimulator/generator on 5/14/2021. Overall she is doing well. She does have pain in her left calf and has a history of blood clots in the past so I have ordered a duplex scan. I recommended that she contact the [employer] team to schedule a session to adjust her stimulator so that she can have symmetric stimulation on both sides. I will plan to see her again in 4 weeks for another wound check.         Electronically signed by Jennifer Herrera MD on 5/27/2021 at 11:09 AM

## 2021-06-07 ENCOUNTER — HOSPITAL ENCOUNTER (OUTPATIENT)
Dept: NON INVASIVE DIAGNOSTICS | Age: 71
Discharge: HOME | End: 2021-06-07
Payer: MEDICARE

## 2021-06-07 DIAGNOSIS — M79.662 PAIN OF LEFT CALF: ICD-10-CM

## 2021-06-07 PROCEDURE — 93971 EXTREMITY STUDY: CPT

## 2021-06-28 ENCOUNTER — OFFICE VISIT (OUTPATIENT)
Dept: NEUROSURGERY | Age: 71
End: 2021-06-28

## 2021-06-28 VITALS
WEIGHT: 180 LBS | HEART RATE: 74 BPM | BODY MASS INDEX: 30.73 KG/M2 | HEIGHT: 64 IN | SYSTOLIC BLOOD PRESSURE: 114 MMHG | DIASTOLIC BLOOD PRESSURE: 64 MMHG

## 2021-06-28 DIAGNOSIS — Z96.89 S/P INSERTION OF SPINAL CORD STIMULATOR: Primary | ICD-10-CM

## 2021-06-28 PROCEDURE — 99024 POSTOP FOLLOW-UP VISIT: CPT | Performed by: NEUROLOGICAL SURGERY

## 2021-06-28 NOTE — PROGRESS NOTES
NEUROSURGERY POSTOPERATIVE FOLLOW UP NOTE      Chief Complaint:   Chief Complaint   Patient presents with    Follow-up     back pain. Date of Surgery: 5/14/2021    Procedure Performed:  Thoracic laminectomy for implantation of spinal cord stimulator and generator      Interval Update:  Second postoperative follow up since surgery. She continues to report midline back pain that is quite bothersome to her. She does note improvement in her bilateral hip, leg and knee pain when she is using her stimulator. She denies any new radicular pain or weakness. HPI: The patient is a 64 y. o. female who presents as a referral from Dr. Celaya Case office to discuss placement of a spinal cord stimulator.  She has a longstanding history of back pain with multiple previous lumbar spine fusion surgeries. Emile Dia is an established patient with Dr. Ramon Cade having received injections in the past however the lumbar injections have become ineffective in controlling her chronic lower back pain and leg pain.  She describes pain in her lower back that is worsened by activity.  The pain is mostly back pain with some pain in her right hip and leg.  She endorses some chronic tingling in the lateral aspect of her right leg but no numbness.  She feels generally weak in her lower extremities but denies any focal weakness.  She endorses chronic constipation but denies any difficulty with incontinence of bowel or bladder.  She recently underwent a trial of spinal cord stimulation with Dr. Ramon Cade and she states she got \"99%\" pain relief of her back and leg pain while the trial leads were in place.  She presents with a recent lumbar MRI scan from 1/2021 and a thoracic MRI was obtained that did not show any significant spinal stenosis. The stimulator was implanted on 5/14/2021.       Objective:    /64   Pulse 74   Ht 5' 3.5\" (1.613 m)   Wt 180 lb (81.6 kg)   BMI 31.39 kg/m²         Physical Exam:    General: alert, cooperative, no distress  Cardiorespiratory: unlabored breathing  Wound: Both incisions essentially healed      Neurologic Exam:    Mental Status: Alert, oriented, thought content appropriate  Cranial Nerves: PERRL, EOMI, symmetric facies, tongue midline  Motor: Motor exam is symmetrical 5 out of 5 all extremities bilaterally  Somatosensory: normal light touch sensation          Assessment and Plan:  65 y/o F returns for follow up after thoracic laminectomy and implantation of a spinal cord stimulator/generator on 5/14/2021. She reports good relief of her hip and leg pain when the stimulator is active. She continues to endorse midline back pain. I recommended that she continue to discuss this with pain management, as well as the [employer] team to see if additional stimulator adjustments might help her pain and also to consider medication adjustments or additional injections.       Electronically signed by Benita Maldonado MD on 6/28/2021 at 1:55 PM

## 2021-07-12 ENCOUNTER — OFFICE VISIT (OUTPATIENT)
Dept: OTOLARYNGOLOGY | Facility: CLINIC | Age: 71
End: 2021-07-12

## 2021-07-12 VITALS
BODY MASS INDEX: 33.2 KG/M2 | HEART RATE: 85 BPM | TEMPERATURE: 96.4 F | HEIGHT: 63 IN | DIASTOLIC BLOOD PRESSURE: 74 MMHG | SYSTOLIC BLOOD PRESSURE: 162 MMHG | WEIGHT: 187.4 LBS

## 2021-07-12 DIAGNOSIS — J30.1 NON-SEASONAL ALLERGIC RHINITIS DUE TO POLLEN: ICD-10-CM

## 2021-07-12 DIAGNOSIS — F17.200 TOBACCO USE DISORDER: ICD-10-CM

## 2021-07-12 DIAGNOSIS — K13.21 ORAL LEUKOPLAKIA: Primary | ICD-10-CM

## 2021-07-12 PROCEDURE — 99214 OFFICE O/P EST MOD 30 MIN: CPT | Performed by: OTOLARYNGOLOGY

## 2021-07-12 RX ORDER — ASPIRIN 81 MG/1
81 TABLET ORAL DAILY
COMMUNITY

## 2021-07-12 RX ORDER — LATANOPROST 0.05 MG/ML
1 SOLUTION/ DROPS OPHTHALMIC; TOPICAL NIGHTLY
COMMUNITY
End: 2021-08-03

## 2021-07-12 NOTE — H&P (VIEW-ONLY)
YOB: 1950  Location: [facility]  Location Address: [address] [place] [address], [facility], [address] [place] [address]  Location Phone: [phone]    Chief Complaint   Patient presents with   • Follow-up     PT complains of tongue getting worse; it is no longer smooth but it is now rough feeling       History of Present Illness  Blanca Martin is a 71 y.o. female.  Blanca Martin is here for follow up of ENT complaints. The patient has had problems with tongue sores  The symptoms are localized to the underside of tongue. Patient has a white patch on the left side. The patient has had moderate symptoms. The symptoms have been present for the last year but the symptoms have become worse since the first of the year. The symptoms are aggravated by  eating and drinking.  There have been no factors that have improved the symptoms.    Patient continues to smoke and indicates that she has increased the amount that she smokes in the last year.  She has purchased patches but has not started using them yet.    I have personally reviewed the information imported into the chart during this visit.      I have personally reviewed the review of systems.           Past Medical History:   Diagnosis Date   • Arthritis    • Chronic back pain    • Chronic kidney disease     stage 2   • Depression    • Diabetes mellitus (CMS/HCC)    • H/O blood clots    • Hyperlipidemia    • Hypertension    • PONV (postoperative nausea and vomiting)    • Spinal headache    • Vascular disease        Past Surgical History:   Procedure Laterality Date   • ANTERIOR CERVICAL DISCECTOMY W/ FUSION N/A 2018    Procedure: ANTERIOR CERVICAL DISCECTOMY FUSION C4-5, C6-7 WITH INSTRUMENTATION C4-7;  Surgeon: JAXSON Rm MD;  Location: [facility];  Service: Orthopedic Spine   • APPENDECTOMY     • BACK SURGERY     • CARDIAC CATHETERIZATION     • CARPAL TUNNEL RELEASE     • CERVICAL FUSION      C5-6   •  SECTION     •  CHOLECYSTECTOMY     • GANGLION CYST EXCISION Right    • HERNIA REPAIR  1992    VENTRAL    • HYSTERECTOMY     • JOINT REPLACEMENT Bilateral 2012 2013   • KNEE SURGERY     • LAMINECTOMY THORACIC SPINE W/ PLACEMENT SPINAL CORD STIMULATOR     • LEG SURGERY      blood clot removed   • LUMBAR FUSION  2010 2015    L2-5/L5S1   • NASAL SEPTUM SURGERY  2005   • NECK SURGERY     • RADIOFREQUENCY ABLATION Right 2014   • SHOULDER SURGERY     • TONSILLECTOMY     • TRIGGER FINGER RELEASE Left 2009   • VEIN LIGATION AND STRIPPING Left        Outpatient Medications Marked as Taking for the 7/12/21 encounter (Office Visit) with Peter Navarro MD   Medication Sig Dispense Refill   • aspirin 81 MG EC tablet Take 81 mg by mouth Daily. Dr. Lassiter     • atorvastatin (LIPITOR) 20 MG tablet Take  by mouth.     • bisoprolol (ZEBeta) 5 MG tablet Take 5 mg by mouth Daily.     • busPIRone (BUSPAR) 15 MG tablet Take 15 mg by mouth Daily As Needed (AS NEEDED FOR ANXIETY).     • Calcium Carb-Cholecalciferol (CALCIUM PLUS D3 ABSORBABLE) 600-2500 MG-UNIT capsule Take 1 tablet by mouth Daily.     • CloNIDine (CATAPRES) 0.1 MG tablet Take 0.1 mg by mouth Daily As Needed for High Blood Pressure (SYSOVER 160 COREEN. OVER 120).     • diclofenac (VOLTAREN) 75 MG EC tablet      • DULoxetine (CYMBALTA) 60 MG capsule Take 60 mg by mouth Daily.     • fluticasone (FLONASE) 50 MCG/ACT nasal spray 2 sprays into the nostril(s) as directed by provider Daily. 16 g 11   • glimepiride (AMARYL) 2 MG tablet Take 2 mg by mouth Daily As Needed (IF BS OVER 150).     • Latanoprost (Xelpros) 0.005 % emulsion Apply 1 drop to eye(s) as directed by provider.     • metFORMIN (GLUCOPHAGE) 1000 MG tablet Take 1,000 mg by mouth 2 (Two) Times a Day.     • Multiple Vitamins-Minerals (WOMENS DAILY FORMULA) tablet Take 1 tablet by mouth Daily.     • NIFEDIPINE ER PO Take  by mouth.     • omeprazole (priLOSEC) 40 MG capsule Take 40 mg by mouth As Needed.     •  oxyCODONE-acetaminophen (PERCOCET) 5-325 MG per tablet      • tiZANidine (ZANAFLEX) 4 MG tablet Take 4 mg by mouth Every 8 (Eight) Hours As Needed for Muscle Spasms.     • [DISCONTINUED] montelukast (SINGULAIR) 10 MG tablet Take 10 mg by mouth Every Night.         Codeine, Amoxicillin-pot clavulanate, Ace inhibitors, Advair diskus [fluticasone-salmeterol], Ascorbate, Avapro [irbesartan], Hydrochlorothiazide, Irbesartan-hydrochlorothiazide, Simvastatin, and Thiazide-type diuretics    Family History   Problem Relation Age of Onset   • Cancer Father    • Lung cancer Mother    • Heart disease Mother        Social History     Socioeconomic History   • Marital status:      Spouse name: Not on file   • Number of children: Not on file   • Years of education: Not on file   • Highest education level: Not on file   Tobacco Use   • Smoking status: Current Every Day Smoker     Packs/day: 1.00     Years: 46.00     Pack years: 46.00     Types: Cigarettes     Start date: 1980   • Smokeless tobacco: Never Used   Vaping Use   • Vaping Use: Never used   Substance and Sexual Activity   • Alcohol use: No   • Drug use: Not Currently   • Sexual activity: Defer       Review of Systems   Constitutional: Negative.    HENT:        Tongue lesion   Eyes: Negative.    Respiratory: Negative.    Cardiovascular: Negative.    Gastrointestinal: Negative.    Endocrine: Negative.    Genitourinary: Negative.    Musculoskeletal: Negative.    Skin: Negative.    Allergic/Immunologic: Negative.    Neurological: Negative.    Hematological: Negative.    Psychiatric/Behavioral: Negative.        Vitals:    07/12/21 1455   BP: 162/74   Pulse: 85   Temp: 96.4 °F (35.8 °C)       Body mass index is 33.2 kg/m².    Objective     Physical Exam  CONSTITUTIONAL: well nourished, well-developed, alert, oriented, in no acute distress     COMMUNICATION AND VOICE: able to communicate normally, normal voice quality    HEAD: normocephalic, no lesions, atraumatic, no  tenderness, no masses     FACE: appearance normal, no lesions, no tenderness, no deformities, facial motion symmetric    EYES: ocular motility normal, eyelids normal, orbits normal, no proptosis, conjunctiva normal , pupils equal, round     EARS:  Hearing: hearing to conversational voice intact bilaterally   External Ears: normal bilaterally, no lesions  TMs  AS-clear and intact TM with well ventilated middle ear space  AD-clear and intact TM with well ventilated middle ear space    NOSE:  External Nose: external nasal structure normal, no tenderness on palpation, no nasal discharge, no lesions, no evidence of trauma, nostrils patent     ORAL:  Lips: upper and lower lips without lesion   OC/OP-approximately 1.4 x 0.9 cm area of leukoplakia in the left ventral tongue.  This is a thick area of leukoplakia with minimal surrounding erythema.  There is a smaller more superficial area of leukoplakia in the right ventral tongue just to the right of the lingual frenulum which is approximately 5 x 4 mm.    NECK:  Inspection and Palpation: neck appearance normal, no masses or tenderness    CHEST/RESPIRATORY: normal respiratory effort     CARDIOVASCULAR: no cyanosis or edema     NEUROLOGICAL/PSYCHIATRIC: oriented to time, place and person, mood normal, affect appropriate, CN II-XII intact grossly    Assessment/Plan   Diagnoses and all orders for this visit:    1. Oral leukoplakia ventral tongue (Primary)  -     Case Request; Standing  -     Comprehensive Metabolic Panel; Future  -     CBC (No Diff); Future  -     ECG 12 Lead; Future  -     XR Chest 2 View; Future  -     Case Request    2. Tobacco use disorder  -     Case Request; Standing  -     Comprehensive Metabolic Panel; Future  -     CBC (No Diff); Future  -     ECG 12 Lead; Future  -     XR Chest 2 View; Future  -     Case Request    3. Non-seasonal allergic rhinitis due to pollen    Other orders  -     Follow Anesthesia Guidelines / Protocol; Future  -     Obtain  Informed Consent; Future  -     Follow Anesthesia Guidelines / Protocol; Standing  -     NPO Diet; Standing  -     Verify NPO Status; Standing  -     Obtain Informed Consent; Standing  -     SCD (Sequential Compression Device) - To Be Placed on Patient in Pre-Op; Standing      MICRODIRECT LARYNGOSCOPY WITH LASER EXCISION OF LESION oF THE ANTERIOR FLOOR OF MOUTH (N/A)  Orders Placed This Encounter   Procedures   • XR Chest 2 View     Standing Status:   Future     Standing Expiration Date:   7/12/2022     Order Specific Question:   Reason for Exam:     Answer:   MICRODIRECT LARYNGOSCOPY WITH LASER EXCISION OF LESION oF THE ANTERIOR FLOOR OF MOUTH     Order Specific Question:   Release to patient     Answer:   Immediate   • Comprehensive Metabolic Panel     Standing Status:   Future     Standing Expiration Date:   7/12/2022     Order Specific Question:   Release to patient     Answer:   Immediate   • CBC (No Diff)     Standing Status:   Future     Standing Expiration Date:   7/12/2022     Order Specific Question:   Release to patient     Answer:   Immediate   • Follow Anesthesia Guidelines / Protocol     Standing Status:   Future   • Obtain Informed Consent     Standing Status:   Future     Order Specific Question:   Informed Consent Given For     Answer:   MICRODIRECT LARYNGOSCOPY WITH LASER EXCISION OF LESION oF THE ANTERIOR FLOOR OF MOUTH   • ECG 12 Lead     Standing Status:   Future     Standing Expiration Date:   7/12/2022     Order Specific Question:   Reason for Exam:     Answer:   MICRODIRECT LARYNGOSCOPY WITH LASER EXCISION OF LESION oF THE ANTERIOR FLOOR OF MOUTH     Order Specific Question:   Release to patient     Answer:   Immediate     Return for postop.       Patient Instructions   MICRODIRECT LARYNGOSCOPY WITH LASER EXCISION OF LESION: The risks and benefits were explained including but not limited to pain, bleeding, infection, (including possible mediastinitis), the risks of the general anesthesia,  pain, temporary or permanent hoarseness, recurrent and or persistent lesion, airway loss, and/or tooth injury. Questions were answered. No guarantees were made or implied.      I advised the patient of the risks in continuing to use tobacco and recommended complete cessation, The inherent risks including the risk of disability, development of a malignancy and/or death was discussed.  The patient indicated understanding.

## 2021-07-12 NOTE — PROGRESS NOTES
YOB: 1950  Location: [facility]  Location Address: [address] [place] [address], [facility] [address], [address] [place] [address]  Location Phone: [phone]    Chief Complaint   Patient presents with   • Follow-up     PT complains of tongue getting worse; it is no longer smooth but it is now rough feeling       History of Present Illness  Blanca Martin is a 71 y.o. female.  Blanca Martin is here for follow up of ENT complaints. The patient has had problems with tongue sores  The symptoms are localized to the underside of tongue. Patient has a white patch on the left side. The patient has had moderate symptoms. The symptoms have been present for the last year but the symptoms have become worse since the first of the year. The symptoms are aggravated by  eating and drinking.  There have been no factors that have improved the symptoms.    Patient continues to smoke and indicates that she has increased the amount that she smokes in the last year.  She has purchased patches but has not started using them yet.    I have personally reviewed the information imported into the chart during this visit.      I have personally reviewed the review of systems.           Past Medical History:   Diagnosis Date   • Arthritis    • Chronic back pain    • Chronic kidney disease     stage 2   • Depression    • Diabetes mellitus (CMS/HCC)    • H/O blood clots    • Hyperlipidemia    • Hypertension    • PONV (postoperative nausea and vomiting)    • Spinal headache    • Vascular disease        Past Surgical History:   Procedure Laterality Date   • ANTERIOR CERVICAL DISCECTOMY W/ FUSION N/A 2018    Procedure: ANTERIOR CERVICAL DISCECTOMY FUSION C4-5, C6-7 WITH INSTRUMENTATION C4-7;  Surgeon: JAXSON Rm MD;  Location: [facility];  Service: Orthopedic Spine   • APPENDECTOMY     • BACK SURGERY     • CARDIAC CATHETERIZATION     • CARPAL TUNNEL RELEASE     • CERVICAL FUSION      C5-6   •  SECTION     •  CHOLECYSTECTOMY     • GANGLION CYST EXCISION Right    • HERNIA REPAIR  1992    VENTRAL    • HYSTERECTOMY     • JOINT REPLACEMENT Bilateral 2012 2013   • KNEE SURGERY     • LAMINECTOMY THORACIC SPINE W/ PLACEMENT SPINAL CORD STIMULATOR     • LEG SURGERY      blood clot removed   • LUMBAR FUSION  2010 2015    L2-5/L5S1   • NASAL SEPTUM SURGERY  2005   • NECK SURGERY     • RADIOFREQUENCY ABLATION Right 2014   • SHOULDER SURGERY     • TONSILLECTOMY     • TRIGGER FINGER RELEASE Left 2009   • VEIN LIGATION AND STRIPPING Left        Outpatient Medications Marked as Taking for the 7/12/21 encounter (Office Visit) with Peter Navarro MD   Medication Sig Dispense Refill   • aspirin 81 MG EC tablet Take 81 mg by mouth Daily. Dr. Lassiter     • atorvastatin (LIPITOR) 20 MG tablet Take  by mouth.     • bisoprolol (ZEBeta) 5 MG tablet Take 5 mg by mouth Daily.     • busPIRone (BUSPAR) 15 MG tablet Take 15 mg by mouth Daily As Needed (AS NEEDED FOR ANXIETY).     • Calcium Carb-Cholecalciferol (CALCIUM PLUS D3 ABSORBABLE) 600-2500 MG-UNIT capsule Take 1 tablet by mouth Daily.     • CloNIDine (CATAPRES) 0.1 MG tablet Take 0.1 mg by mouth Daily As Needed for High Blood Pressure (SYSOVER 160 COREEN. OVER 120).     • diclofenac (VOLTAREN) 75 MG EC tablet      • DULoxetine (CYMBALTA) 60 MG capsule Take 60 mg by mouth Daily.     • fluticasone (FLONASE) 50 MCG/ACT nasal spray 2 sprays into the nostril(s) as directed by provider Daily. 16 g 11   • glimepiride (AMARYL) 2 MG tablet Take 2 mg by mouth Daily As Needed (IF BS OVER 150).     • Latanoprost (Xelpros) 0.005 % emulsion Apply 1 drop to eye(s) as directed by provider.     • metFORMIN (GLUCOPHAGE) 1000 MG tablet Take 1,000 mg by mouth 2 (Two) Times a Day.     • Multiple Vitamins-Minerals (WOMENS DAILY FORMULA) tablet Take 1 tablet by mouth Daily.     • NIFEDIPINE ER PO Take  by mouth.     • omeprazole (priLOSEC) 40 MG capsule Take 40 mg by mouth As Needed.     •  oxyCODONE-acetaminophen (PERCOCET) 5-325 MG per tablet      • tiZANidine (ZANAFLEX) 4 MG tablet Take 4 mg by mouth Every 8 (Eight) Hours As Needed for Muscle Spasms.     • [DISCONTINUED] montelukast (SINGULAIR) 10 MG tablet Take 10 mg by mouth Every Night.         Codeine, Amoxicillin-pot clavulanate, Ace inhibitors, Advair diskus [fluticasone-salmeterol], Ascorbate, Avapro [irbesartan], Hydrochlorothiazide, Irbesartan-hydrochlorothiazide, Simvastatin, and Thiazide-type diuretics    Family History   Problem Relation Age of Onset   • Cancer Father    • Lung cancer Mother    • Heart disease Mother        Social History     Socioeconomic History   • Marital status:      Spouse name: Not on file   • Number of children: Not on file   • Years of education: Not on file   • Highest education level: Not on file   Tobacco Use   • Smoking status: Current Every Day Smoker     Packs/day: 1.00     Years: 46.00     Pack years: 46.00     Types: Cigarettes     Start date: 1980   • Smokeless tobacco: Never Used   Vaping Use   • Vaping Use: Never used   Substance and Sexual Activity   • Alcohol use: No   • Drug use: Not Currently   • Sexual activity: Defer       Review of Systems   Constitutional: Negative.    HENT:        Tongue lesion   Eyes: Negative.    Respiratory: Negative.    Cardiovascular: Negative.    Gastrointestinal: Negative.    Endocrine: Negative.    Genitourinary: Negative.    Musculoskeletal: Negative.    Skin: Negative.    Allergic/Immunologic: Negative.    Neurological: Negative.    Hematological: Negative.    Psychiatric/Behavioral: Negative.        Vitals:    07/12/21 1455   BP: 162/74   Pulse: 85   Temp: 96.4 °F (35.8 °C)       Body mass index is 33.2 kg/m².    Objective     Physical Exam  CONSTITUTIONAL: well nourished, well-developed, alert, oriented, in no acute distress     COMMUNICATION AND VOICE: able to communicate normally, normal voice quality    HEAD: normocephalic, no lesions, atraumatic, no  tenderness, no masses     FACE: appearance normal, no lesions, no tenderness, no deformities, facial motion symmetric    EYES: ocular motility normal, eyelids normal, orbits normal, no proptosis, conjunctiva normal , pupils equal, round     EARS:  Hearing: hearing to conversational voice intact bilaterally   External Ears: normal bilaterally, no lesions  TMs  AS-clear and intact TM with well ventilated middle ear space  AD-clear and intact TM with well ventilated middle ear space    NOSE:  External Nose: external nasal structure normal, no tenderness on palpation, no nasal discharge, no lesions, no evidence of trauma, nostrils patent     ORAL:  Lips: upper and lower lips without lesion   OC/OP-approximately 1.4 x 0.9 cm area of leukoplakia in the left ventral tongue.  This is a thick area of leukoplakia with minimal surrounding erythema.  There is a smaller more superficial area of leukoplakia in the right ventral tongue just to the right of the lingual frenulum which is approximately 5 x 4 mm.    NECK:  Inspection and Palpation: neck appearance normal, no masses or tenderness    CHEST/RESPIRATORY: normal respiratory effort     CARDIOVASCULAR: no cyanosis or edema     NEUROLOGICAL/PSYCHIATRIC: oriented to time, place and person, mood normal, affect appropriate, CN II-XII intact grossly    Assessment/Plan   Diagnoses and all orders for this visit:    1. Oral leukoplakia ventral tongue (Primary)  -     Case Request; Standing  -     Comprehensive Metabolic Panel; Future  -     CBC (No Diff); Future  -     ECG 12 Lead; Future  -     XR Chest 2 View; Future  -     Case Request    2. Tobacco use disorder  -     Case Request; Standing  -     Comprehensive Metabolic Panel; Future  -     CBC (No Diff); Future  -     ECG 12 Lead; Future  -     XR Chest 2 View; Future  -     Case Request    3. Non-seasonal allergic rhinitis due to pollen    Other orders  -     Follow Anesthesia Guidelines / Protocol; Future  -     Obtain  Informed Consent; Future  -     Follow Anesthesia Guidelines / Protocol; Standing  -     NPO Diet; Standing  -     Verify NPO Status; Standing  -     Obtain Informed Consent; Standing  -     SCD (Sequential Compression Device) - To Be Placed on Patient in Pre-Op; Standing      MICRODIRECT LARYNGOSCOPY WITH LASER EXCISION OF LESION oF THE ANTERIOR FLOOR OF MOUTH (N/A)  Orders Placed This Encounter   Procedures   • XR Chest 2 View     Standing Status:   Future     Standing Expiration Date:   7/12/2022     Order Specific Question:   Reason for Exam:     Answer:   MICRODIRECT LARYNGOSCOPY WITH LASER EXCISION OF LESION oF THE ANTERIOR FLOOR OF MOUTH     Order Specific Question:   Release to patient     Answer:   Immediate   • Comprehensive Metabolic Panel     Standing Status:   Future     Standing Expiration Date:   7/12/2022     Order Specific Question:   Release to patient     Answer:   Immediate   • CBC (No Diff)     Standing Status:   Future     Standing Expiration Date:   7/12/2022     Order Specific Question:   Release to patient     Answer:   Immediate   • Follow Anesthesia Guidelines / Protocol     Standing Status:   Future   • Obtain Informed Consent     Standing Status:   Future     Order Specific Question:   Informed Consent Given For     Answer:   MICRODIRECT LARYNGOSCOPY WITH LASER EXCISION OF LESION oF THE ANTERIOR FLOOR OF MOUTH   • ECG 12 Lead     Standing Status:   Future     Standing Expiration Date:   7/12/2022     Order Specific Question:   Reason for Exam:     Answer:   MICRODIRECT LARYNGOSCOPY WITH LASER EXCISION OF LESION oF THE ANTERIOR FLOOR OF MOUTH     Order Specific Question:   Release to patient     Answer:   Immediate     Return for postop.       Patient Instructions   MICRODIRECT LARYNGOSCOPY WITH LASER EXCISION OF LESION: The risks and benefits were explained including but not limited to pain, bleeding, infection, (including possible mediastinitis), the risks of the general anesthesia,  pain, temporary or permanent hoarseness, recurrent and or persistent lesion, airway loss, and/or tooth injury. Questions were answered. No guarantees were made or implied.      I advised the patient of the risks in continuing to use tobacco and recommended complete cessation, The inherent risks including the risk of disability, development of a malignancy and/or death was discussed.  The patient indicated understanding.

## 2021-07-12 NOTE — PATIENT INSTRUCTIONS
MICRODIRECT LARYNGOSCOPY WITH LASER EXCISION OF LESION: The risks and benefits were explained including but not limited to pain, bleeding, infection, (including possible mediastinitis), the risks of the general anesthesia, pain, temporary or permanent hoarseness, recurrent and or persistent lesion, airway loss, and/or tooth injury. Questions were answered. No guarantees were made or implied.      I advised the patient of the risks in continuing to use tobacco and recommended complete cessation, The inherent risks including the risk of disability, development of a malignancy and/or death was discussed.  The patient indicated understanding.

## 2021-07-14 ENCOUNTER — TELEPHONE (OUTPATIENT)
Dept: OTOLARYNGOLOGY | Facility: CLINIC | Age: 71
End: 2021-07-14

## 2021-07-14 NOTE — TELEPHONE ENCOUNTER
Per Justyna with Dr. Lsasiter,  patient can stop aspirin 7 days prior to surgery and restart asap. Patient notified

## 2021-07-14 NOTE — TELEPHONE ENCOUNTER
----- Message from Krista Noble RN sent at 7/13/2021  9:19 AM CDT -----  Cl with kathrine re blood thinner

## 2021-07-16 ENCOUNTER — TRANSCRIBE ORDERS (OUTPATIENT)
Dept: ADMINISTRATIVE | Facility: HOSPITAL | Age: 71
End: 2021-07-16

## 2021-07-16 DIAGNOSIS — Z11.59 SCREENING FOR VIRAL DISEASE: Primary | ICD-10-CM

## 2021-07-20 ENCOUNTER — HOSPITAL ENCOUNTER (OUTPATIENT)
Dept: GENERAL RADIOLOGY | Facility: HOSPITAL | Age: 71
Discharge: HOME | End: 2021-07-20

## 2021-07-20 ENCOUNTER — LAB (OUTPATIENT)
Dept: LAB | Facility: HOSPITAL | Age: 71
End: 2021-07-20

## 2021-07-20 ENCOUNTER — PRE-ADMISSION TESTING (OUTPATIENT)
Dept: PREADMISSION TESTING | Facility: HOSPITAL | Age: 71
End: 2021-07-20

## 2021-07-20 VITALS
SYSTOLIC BLOOD PRESSURE: 161 MMHG | OXYGEN SATURATION: 100 % | HEIGHT: 62 IN | HEART RATE: 86 BPM | WEIGHT: 179.23 LBS | DIASTOLIC BLOOD PRESSURE: 68 MMHG | BODY MASS INDEX: 32.98 KG/M2 | RESPIRATION RATE: 18 BRPM

## 2021-07-20 DIAGNOSIS — F17.200 TOBACCO USE DISORDER: ICD-10-CM

## 2021-07-20 DIAGNOSIS — K13.21 ORAL LEUKOPLAKIA: ICD-10-CM

## 2021-07-20 LAB
ALBUMIN SERPL-MCNC: 4.1 G/DL (ref 3.5–5.2)
ALBUMIN/GLOB SERPL: 1.7 G/DL
ALP SERPL-CCNC: 82 U/L (ref 39–117)
ALT SERPL W P-5'-P-CCNC: 16 U/L (ref 1–33)
ANION GAP SERPL CALCULATED.3IONS-SCNC: 10 MMOL/L (ref 5–15)
AST SERPL-CCNC: 13 U/L (ref 1–32)
BILIRUB SERPL-MCNC: <0.2 MG/DL (ref 0–1.2)
BUN SERPL-MCNC: 21 MG/DL (ref 8–23)
BUN/CREAT SERPL: 18.6 (ref 7–25)
CALCIUM SPEC-SCNC: 8.9 MG/DL (ref 8.6–10.5)
CHLORIDE SERPL-SCNC: 104 MMOL/L (ref 98–107)
CO2 SERPL-SCNC: 25 MMOL/L (ref 22–29)
CREAT SERPL-MCNC: 1.13 MG/DL (ref 0.57–1)
DEPRECATED RDW RBC AUTO: 51 FL (ref 37–54)
ERYTHROCYTE [DISTWIDTH] IN BLOOD BY AUTOMATED COUNT: 14 % (ref 12.3–15.4)
GFR SERPL CREATININE-BSD FRML MDRD: 47 ML/MIN/1.73
GLOBULIN UR ELPH-MCNC: 2.4 GM/DL
GLUCOSE SERPL-MCNC: 125 MG/DL (ref 65–99)
HCT VFR BLD AUTO: 36 % (ref 34–46.6)
HGB BLD-MCNC: 11.7 G/DL (ref 12–15.9)
MCH RBC QN AUTO: 32.3 PG (ref 26.6–33)
MCHC RBC AUTO-ENTMCNC: 32.5 G/DL (ref 31.5–35.7)
MCV RBC AUTO: 99.4 FL (ref 79–97)
PLATELET # BLD AUTO: 238 10*3/MM3 (ref 140–450)
PMV BLD AUTO: 9.4 FL (ref 6–12)
POTASSIUM SERPL-SCNC: 4.4 MMOL/L (ref 3.5–5.2)
PROT SERPL-MCNC: 6.5 G/DL (ref 6–8.5)
RBC # BLD AUTO: 3.62 10*6/MM3 (ref 3.77–5.28)
SARS-COV-2 ORF1AB RESP QL NAA+PROBE: NOT DETECTED
SODIUM SERPL-SCNC: 139 MMOL/L (ref 136–145)
WBC # BLD AUTO: 7.79 10*3/MM3 (ref 3.4–10.8)

## 2021-07-20 PROCEDURE — C9803 HOPD COVID-19 SPEC COLLECT: HCPCS | Performed by: OTOLARYNGOLOGY

## 2021-07-20 PROCEDURE — 85027 COMPLETE CBC AUTOMATED: CPT

## 2021-07-20 PROCEDURE — U0004 COV-19 TEST NON-CDC HGH THRU: HCPCS | Performed by: OTOLARYNGOLOGY

## 2021-07-20 PROCEDURE — 80053 COMPREHEN METABOLIC PANEL: CPT

## 2021-07-20 PROCEDURE — 36415 COLL VENOUS BLD VENIPUNCTURE: CPT

## 2021-07-20 PROCEDURE — 71046 X-RAY EXAM CHEST 2 VIEWS: CPT

## 2021-07-20 PROCEDURE — U0005 INFEC AGEN DETEC AMPLI PROBE: HCPCS | Performed by: OTOLARYNGOLOGY

## 2021-07-20 PROCEDURE — 93005 ELECTROCARDIOGRAM TRACING: CPT

## 2021-07-20 PROCEDURE — 93010 ELECTROCARDIOGRAM REPORT: CPT | Performed by: EMERGENCY MEDICINE

## 2021-07-20 RX ORDER — DOXAZOSIN 2 MG/1
2 TABLET ORAL DAILY
COMMUNITY

## 2021-07-20 NOTE — DISCHARGE INSTRUCTIONS
DAY OF SURGERY INSTRUCTIONS        YOUR SURGEON: Dr. Peter Navarro    PROCEDURE: Microdirect laryngoscopy with laser excision of lesion of the anterior floor of mouth    DATE OF SURGERY: Friday July 23, 2021    ARRIVAL TIME: AS DIRECTED BY OFFICE    YOU MAY TAKE THE FOLLOWING MEDICATION(S) THE MORNING OF SURGERY WITH A SIP OF WATER: Bisoprolol (ZEBETA), Buspirone (BUSPAR) if needed for anxiety, Oxycodone/ Acetaminophen (PERCOCET) if needed for pain    ALL OTHER HOME MEDICATIONS CHECK WITH YOUR DOCTOR          MANAGING PAIN AFTER SURGERY    We know you are probably wondering what your pain will be like after surgery.  Following surgery it is unrealistic to expect you will not have pain.   Pain is how our bodies let us know that something is wrong or cautions us to be careful.  That said, our goal is to make your pain tolerable.    Methods we may use to treat your pain include (oral or IV medications, PCAs, epidurals, nerve blocks, etc.)   While some procedures require IV pain medications for a short time after surgery, transitioning to pain medications by mouth allows for better management of pain.   Your nurse will encourage you to take oral pain medications whenever possible.  IV medications work almost immediately, but only last a short while.  Taking medications by mouth allows for a more constant level of medication in your blood stream for a longer period of time.      Once your pain is out of control it is harder to get back under control.  It is important you are aware when your next dose of pain medication is due.  If you are admitted, your nurse may write the time of your next dose on the white board in your room to help you remember.      We are interested in your pain and encourage you to inform us about aggravating factors during your visit.   Many times a simple repositioning every few hours can make a big difference.    If your physician says it is okay, do not let your pain prevent you from getting out  of bed. Be sure to call your nurse for assistance prior to getting up so you do not fall.      Before surgery, please decide your tolerable pain goal.  These faces help describe the pain ratings we use on a 0-10 scale.   Be prepared to tell us your goal and whether or not you take pain or anxiety medications at home.      BEFORE YOU COME TO THE HOSPITAL  (Pre-op instructions)  • Do not eat, drink, smoke or chew gum after midnight the night before surgery.  This also includes no mints.  • Morning of surgery take only the medicines you have been instructed with a sip of water unless otherwise instructed  by your physician.  • Do not shave, wear makeup or dark nail polish.  • Remove all jewelry including rings.  • Leave anything you consider valuable at home.  • Leave your suitcase in the car until after your surgery.  • Bring the following with you if applicable:  o Picture ID and insurance, Medicare or Medicaid cards  o Co-pay/deductible required by insurance (cash, check, credit card)  o Copy of advance directive, living will or power-of- documents if not brought to PAT  o CPAP or BIPAP mask and tubing  o Relaxation aids ( book, magazine), etc.  o Hearing aids                                 ON THE DAY OF SURGERY  · On the day of surgery check in at registration located at the main entrance of the hospital.   ? You will be registered and given a beeper with instructions where to wait in the main lobby.  ? When your beeper lights up and vibrates a member of the Outpatient Surgery staff will meet you at the double doors under the stair steps and escort you to your preoperative room.   · You may have cloth compression devices placed on your legs. These help to prevent blood clots and reduce swelling in your legs.  · An IV may be inserted into one of your veins.  · In the operating room, you may be given one or more of the following:  ? A medicine to help you relax (sedative).  ? A medicine to numb the area  "(local anesthetic).  ? A medicine to make you fall asleep (general anesthetic).  ? A medicine that is injected into an area of your body to numb everything below the injection site (regional anesthetic).  · Your surgical site will be marked or identified.  · You may be given an antibiotic through your IV to help prevent infection.  Contact a health care provider if you:  · Develop a fever of more than 100.4°F (38°C) or other feelings of illness during the 48 hours before your surgery.  · Have symptoms that get worse.  Have questions or concerns about your surgery    General Anesthesia/Surgery, Adult  General anesthesia is the use of medicines to make a person \"go to sleep\" (unconscious) for a medical procedure. General anesthesia must be used for certain procedures, and is often recommended for procedures that:  · Last a long time.  · Require you to be still or in an unusual position.  · Are major and can cause blood loss.  The medicines used for general anesthesia are called general anesthetics. As well as making you unconscious for a certain amount of time, these medicines:  · Prevent pain.  · Control your blood pressure.  · Relax your muscles.  Tell a health care provider about:  · Any allergies you have.  · All medicines you are taking, including vitamins, herbs, eye drops, creams, and over-the-counter medicines.  · Any problems you or family members have had with anesthetic medicines.  · Types of anesthetics you have had in the past.  · Any blood disorders you have.  · Any surgeries you have had.  · Any medical conditions you have.  · Any recent upper respiratory, chest, or ear infections.  · Any history of:  ? Heart or lung conditions, such as heart failure, sleep apnea, asthma, or chronic obstructive pulmonary disease (COPD).  ?  service.  ? Depression or anxiety.  · Any tobacco or drug use, including marijuana or alcohol use.  · Whether you are pregnant or may be pregnant.  What are the " risks?  Generally, this is a safe procedure. However, problems may occur, including:  · Allergic reaction.  · Lung and heart problems.  · Inhaling food or liquid from the stomach into the lungs (aspiration).  · Nerve injury.  · Air in the bloodstream, which can lead to stroke.  · Extreme agitation or confusion (delirium) when you wake up from the anesthetic.  · Waking up during your procedure and being unable to move. This is rare.  These problems are more likely to develop if you are having a major surgery or if you have an advanced or serious medical condition. You can prevent some of these complications by answering all of your health care provider's questions thoroughly and by following all instructions before your procedure.  General anesthesia can cause side effects, including:  · Nausea or vomiting.  · A sore throat from the breathing tube.  · Hoarseness.  · Wheezing or coughing.  · Shaking chills.  · Tiredness.  · Body aches.  · Anxiety.  · Sleepiness or drowsiness.  · Confusion or agitation.  RISKS AND COMPLICATIONS OF SURGERY  Your health care provider will discuss possible risks and complications with you before surgery. Common risks and complications include:    · Problems due to the use of anesthetics.  · Blood loss and replacement (does not apply to minor surgical procedures).  · Temporary increase in pain due to surgery.  · Uncorrected pain or problems that the surgery was meant to correct.  · Infection.  · New damage.    What happens before the procedure?    Medicines  Ask your health care provider about:  · Changing or stopping your regular medicines. This is especially important if you are taking diabetes medicines or blood thinners.  · Taking medicines such as aspirin and ibuprofen. These medicines can thin your blood. Do not take these medicines unless your health care provider tells you to take them.  · Taking over-the-counter medicines, vitamins, herbs, and supplements. Do not take these during  the week before your procedure unless your health care provider approves them.  General instructions  · Starting 3-6 weeks before the procedure, do not use any products that contain nicotine or tobacco, such as cigarettes and e-cigarettes. If you need help quitting, ask your health care provider.  · If you brush your teeth on the morning of the procedure, make sure to spit out all of the toothpaste.  · Tell your health care provider if you become ill or develop a cold, cough, or fever.  · If instructed by your health care provider, bring your sleep apnea device with you on the day of your surgery (if applicable).  · Ask your health care provider if you will be going home the same day, the following day, or after a longer hospital stay.  ? Plan to have someone take you home from the hospital or clinic.  ? Plan to have a responsible adult care for you for at least 24 hours after you leave the hospital or clinic. This is important.  What happens during the procedure?  · You will be given anesthetics through both of the following:  ? A mask placed over your nose and mouth.  ? An IV in one of your veins.  · You may receive a medicine to help you relax (sedative).  · After you are unconscious, a breathing tube may be inserted down your throat to help you breathe. This will be removed before you wake up.  · An anesthesia specialist will stay with you throughout your procedure. He or she will:  ? Keep you comfortable and safe by continuing to give you medicines and adjusting the amount of medicine that you get.  ? Monitor your blood pressure, pulse, and oxygen levels to make sure that the anesthetics do not cause any problems.  The procedure may vary among health care providers and hospitals.  What happens after the procedure?  · Your blood pressure, temperature, heart rate, breathing rate, and blood oxygen level will be monitored until the medicines you were given have worn off.  · You will wake up in a recovery area. You  may wake up slowly.  · If you feel anxious or agitated, you may be given medicine to help you calm down.  · If you will be going home the same day, your health care provider may check to make sure you can walk, drink, and urinate.  · Your health care provider will treat any pain or side effects you have before you go home.  · Do not drive for 24 hours if you were given a sedative.  Summary  · General anesthesia is used to keep you still and prevent pain during a procedure.  · It is important to tell your healthcare provider about your medical history and any surgeries you have had, and previous experience with anesthesia.  · Follow your healthcare provider’s instructions about when to stop eating, drinking, or taking certain medicines before your procedure.  · Plan to have someone take you home from the hospital or clinic.  This information is not intended to replace advice given to you by your health care provider. Make sure you discuss any questions you have with your health care provider.  Document Released: 03/26/2009 Document Revised: 08/03/2018 Document Reviewed: 08/03/2018  [employer] Interactive Patient Education © 2019 [employer]      Fall Prevention in Hospitals, Adult  As a hospital patient, your condition and the treatments you receive can increase your risk for falls. Some additional risk factors for falls in a hospital include:  · Being in an unfamiliar environment.  · Being on bed rest.  · Your surgery.  · Taking certain medicines.  · Your tubing requirements, such as intravenous (IV) therapy or catheters.  It is important that you learn how to decrease fall risks while at the hospital. Below are important tips that can help prevent falls.  SAFETY TIPS FOR PREVENTING FALLS  Talk about your risk of falling.  · Ask your health care provider why you are at risk for falling. Is it your medicine, illness, tubing placement, or something else?  · Make a plan with your health care provider to keep you safe  from falls.  · Ask your health care provider or pharmacist about side effects of your medicines. Some medicines can make you dizzy or affect your coordination.  Ask for help.  · Ask for help before getting out of bed. You may need to press your call button.  · Ask for assistance in getting safely to the toilet.  · Ask for a walker or cane to be put at your bedside. Ask that most of the side rails on your bed be placed up before your health care provider leaves the room.  · Ask family or friends to sit with you.  · Ask for things that are out of your reach, such as your glasses, hearing aids, telephone, bedside table, or call button.  Follow these tips to avoid falling:  · Stay lying or seated, rather than standing, while waiting for help.  · Wear rubber-soled slippers or shoes whenever you walk in the hospital.  · Avoid quick, sudden movements.  ¨ Change positions slowly.  ¨ Sit on the side of your bed before standing.  ¨ Stand up slowly and wait before you start to walk.  · Let your health care provider know if there is a spill on the floor.  · Pay careful attention to the medical equipment, electrical cords, and tubes around you.  · When you need help, use your call button by your bed or in the bathroom. Wait for one of your health care providers to help you.  · If you feel dizzy or unsure of your footing, return to bed and wait for assistance.  · Avoid being distracted by the TV, telephone, or another person in your room.  · Do not lean or support yourself on rolling objects, such as IV poles or bedside tables.     This information is not intended to replace advice given to you by your health care provider. Make sure you discuss any questions you have with your health care provider.     Document Released: 12/15/2001 Document Revised: 01/08/2016 Document Reviewed: 08/25/2013  [employer] Interactive Patient Education ©2016 [employer]            PATIENT/FAMILY/RESPONSIBLE PARTY VERBALIZES UNDERSTANDING OF ABOVE  EDUCATION.  COPY OF PAIN SCALE GIVEN AND REVIEWED WITH VERBALIZED UNDERSTANDING.

## 2021-07-22 LAB
QT INTERVAL: 402 MS
QTC INTERVAL: 434 MS

## 2021-07-23 ENCOUNTER — ANESTHESIA (OUTPATIENT)
Dept: PERIOP | Facility: HOSPITAL | Age: 71
End: 2021-07-23

## 2021-07-23 ENCOUNTER — ANESTHESIA EVENT (OUTPATIENT)
Dept: PERIOP | Facility: HOSPITAL | Age: 71
End: 2021-07-23

## 2021-07-23 ENCOUNTER — HOSPITAL ENCOUNTER (OUTPATIENT)
Facility: HOSPITAL | Age: 71
Setting detail: HOSPITAL OUTPATIENT SURGERY
Discharge: HOME | End: 2021-07-23
Attending: OTOLARYNGOLOGY | Admitting: OTOLARYNGOLOGY

## 2021-07-23 VITALS
SYSTOLIC BLOOD PRESSURE: 131 MMHG | OXYGEN SATURATION: 96 % | TEMPERATURE: 97.7 F | DIASTOLIC BLOOD PRESSURE: 71 MMHG | RESPIRATION RATE: 18 BRPM | HEART RATE: 58 BPM

## 2021-07-23 DIAGNOSIS — K13.21 ORAL LEUKOPLAKIA: ICD-10-CM

## 2021-07-23 DIAGNOSIS — F17.200 TOBACCO USE DISORDER: ICD-10-CM

## 2021-07-23 LAB
GLUCOSE BLDC GLUCOMTR-MCNC: 148 MG/DL (ref 70–130)
GLUCOSE BLDC GLUCOMTR-MCNC: 166 MG/DL (ref 70–130)

## 2021-07-23 PROCEDURE — 25010000002 EPINEPHRINE PER 0.1 MG: Performed by: OTOLARYNGOLOGY

## 2021-07-23 PROCEDURE — 25010000002 PROPOFOL 10 MG/ML EMULSION: Performed by: NURSE ANESTHETIST, CERTIFIED REGISTERED

## 2021-07-23 PROCEDURE — 40812 EXCISE/REPAIR MOUTH LESION: CPT | Performed by: OTOLARYNGOLOGY

## 2021-07-23 PROCEDURE — 25010000002 ONDANSETRON PER 1 MG: Performed by: NURSE ANESTHETIST, CERTIFIED REGISTERED

## 2021-07-23 PROCEDURE — 25010000002 FENTANYL CITRATE (PF) 100 MCG/2ML SOLUTION: Performed by: NURSE ANESTHETIST, CERTIFIED REGISTERED

## 2021-07-23 PROCEDURE — 82962 GLUCOSE BLOOD TEST: CPT

## 2021-07-23 PROCEDURE — 88305 TISSUE EXAM BY PATHOLOGIST: CPT | Performed by: OTOLARYNGOLOGY

## 2021-07-23 RX ORDER — SODIUM CHLORIDE 0.9 % (FLUSH) 0.9 %
3 SYRINGE (ML) INJECTION EVERY 12 HOURS SCHEDULED
Status: DISCONTINUED | OUTPATIENT
Start: 2021-07-23 | End: 2021-07-23 | Stop reason: HOSPADM

## 2021-07-23 RX ORDER — SODIUM CHLORIDE 0.9 % (FLUSH) 0.9 %
3-10 SYRINGE (ML) INJECTION AS NEEDED
Status: DISCONTINUED | OUTPATIENT
Start: 2021-07-23 | End: 2021-07-23 | Stop reason: HOSPADM

## 2021-07-23 RX ORDER — OXYCODONE AND ACETAMINOPHEN 7.5; 325 MG/1; MG/1
2 TABLET ORAL EVERY 4 HOURS PRN
Status: DISCONTINUED | OUTPATIENT
Start: 2021-07-23 | End: 2021-07-23 | Stop reason: HOSPADM

## 2021-07-23 RX ORDER — MAGNESIUM HYDROXIDE 1200 MG/15ML
LIQUID ORAL AS NEEDED
Status: DISCONTINUED | OUTPATIENT
Start: 2021-07-23 | End: 2021-07-23 | Stop reason: HOSPADM

## 2021-07-23 RX ORDER — FENTANYL CITRATE 50 UG/ML
INJECTION, SOLUTION INTRAMUSCULAR; INTRAVENOUS AS NEEDED
Status: DISCONTINUED | OUTPATIENT
Start: 2021-07-23 | End: 2021-07-23 | Stop reason: SURG

## 2021-07-23 RX ORDER — LABETALOL HYDROCHLORIDE 5 MG/ML
5 INJECTION, SOLUTION INTRAVENOUS
Status: DISCONTINUED | OUTPATIENT
Start: 2021-07-23 | End: 2021-07-23 | Stop reason: HOSPADM

## 2021-07-23 RX ORDER — FLUMAZENIL 0.1 MG/ML
0.2 INJECTION INTRAVENOUS AS NEEDED
Status: DISCONTINUED | OUTPATIENT
Start: 2021-07-23 | End: 2021-07-23 | Stop reason: HOSPADM

## 2021-07-23 RX ORDER — NEOSTIGMINE METHYLSULFATE 5 MG/5 ML
SYRINGE (ML) INTRAVENOUS AS NEEDED
Status: DISCONTINUED | OUTPATIENT
Start: 2021-07-23 | End: 2021-07-23 | Stop reason: SURG

## 2021-07-23 RX ORDER — NALOXONE HCL 0.4 MG/ML
0.4 VIAL (ML) INJECTION AS NEEDED
Status: DISCONTINUED | OUTPATIENT
Start: 2021-07-23 | End: 2021-07-23 | Stop reason: HOSPADM

## 2021-07-23 RX ORDER — OXYCODONE AND ACETAMINOPHEN 10; 325 MG/1; MG/1
1 TABLET ORAL ONCE AS NEEDED
Status: DISCONTINUED | OUTPATIENT
Start: 2021-07-23 | End: 2021-07-23 | Stop reason: HOSPADM

## 2021-07-23 RX ORDER — SODIUM CHLORIDE, SODIUM LACTATE, POTASSIUM CHLORIDE, CALCIUM CHLORIDE 600; 310; 30; 20 MG/100ML; MG/100ML; MG/100ML; MG/100ML
100 INJECTION, SOLUTION INTRAVENOUS CONTINUOUS
Status: DISCONTINUED | OUTPATIENT
Start: 2021-07-23 | End: 2021-07-23 | Stop reason: HOSPADM

## 2021-07-23 RX ORDER — OXYCODONE HYDROCHLORIDE AND ACETAMINOPHEN 5; 325 MG/1; MG/1
1 TABLET ORAL ONCE AS NEEDED
Status: DISCONTINUED | OUTPATIENT
Start: 2021-07-23 | End: 2021-07-23 | Stop reason: HOSPADM

## 2021-07-23 RX ORDER — LIDOCAINE HYDROCHLORIDE 20 MG/ML
INJECTION, SOLUTION EPIDURAL; INFILTRATION; INTRACAUDAL; PERINEURAL AS NEEDED
Status: DISCONTINUED | OUTPATIENT
Start: 2021-07-23 | End: 2021-07-23 | Stop reason: SURG

## 2021-07-23 RX ORDER — IBUPROFEN 600 MG/1
600 TABLET ORAL ONCE AS NEEDED
Status: DISCONTINUED | OUTPATIENT
Start: 2021-07-23 | End: 2021-07-23 | Stop reason: HOSPADM

## 2021-07-23 RX ORDER — ONDANSETRON 4 MG/1
4 TABLET, FILM COATED ORAL ONCE AS NEEDED
Status: DISCONTINUED | OUTPATIENT
Start: 2021-07-23 | End: 2021-07-23 | Stop reason: HOSPADM

## 2021-07-23 RX ORDER — CHLORHEXIDINE GLUCONATE 0.12 MG/ML
15 RINSE ORAL 2 TIMES DAILY
Qty: 240 ML | Refills: 0 | Status: SHIPPED | OUTPATIENT
Start: 2021-07-23 | End: 2021-08-03

## 2021-07-23 RX ORDER — MIDAZOLAM HYDROCHLORIDE 1 MG/ML
0.5 INJECTION INTRAMUSCULAR; INTRAVENOUS
Status: DISCONTINUED | OUTPATIENT
Start: 2021-07-23 | End: 2021-07-23 | Stop reason: HOSPADM

## 2021-07-23 RX ORDER — PROPOFOL 10 MG/ML
VIAL (ML) INTRAVENOUS AS NEEDED
Status: DISCONTINUED | OUTPATIENT
Start: 2021-07-23 | End: 2021-07-23 | Stop reason: SURG

## 2021-07-23 RX ORDER — ROCURONIUM BROMIDE 10 MG/ML
INJECTION, SOLUTION INTRAVENOUS AS NEEDED
Status: DISCONTINUED | OUTPATIENT
Start: 2021-07-23 | End: 2021-07-23 | Stop reason: SURG

## 2021-07-23 RX ORDER — FENTANYL CITRATE 50 UG/ML
25 INJECTION, SOLUTION INTRAMUSCULAR; INTRAVENOUS
Status: DISCONTINUED | OUTPATIENT
Start: 2021-07-23 | End: 2021-07-23 | Stop reason: HOSPADM

## 2021-07-23 RX ORDER — SODIUM CHLORIDE 0.9 % (FLUSH) 0.9 %
3 SYRINGE (ML) INJECTION AS NEEDED
Status: DISCONTINUED | OUTPATIENT
Start: 2021-07-23 | End: 2021-07-23 | Stop reason: HOSPADM

## 2021-07-23 RX ORDER — LIDOCAINE HYDROCHLORIDE AND EPINEPHRINE 10; 10 MG/ML; UG/ML
INJECTION, SOLUTION INFILTRATION; PERINEURAL AS NEEDED
Status: DISCONTINUED | OUTPATIENT
Start: 2021-07-23 | End: 2021-07-23 | Stop reason: HOSPADM

## 2021-07-23 RX ORDER — LIDOCAINE HYDROCHLORIDE 10 MG/ML
0.5 INJECTION, SOLUTION EPIDURAL; INFILTRATION; INTRACAUDAL; PERINEURAL ONCE AS NEEDED
Status: DISCONTINUED | OUTPATIENT
Start: 2021-07-23 | End: 2021-07-23 | Stop reason: HOSPADM

## 2021-07-23 RX ORDER — ACETAMINOPHEN 500 MG
1000 TABLET ORAL ONCE
Status: COMPLETED | OUTPATIENT
Start: 2021-07-23 | End: 2021-07-23

## 2021-07-23 RX ORDER — EPINEPHRINE 1 MG/ML
INJECTION, SOLUTION, CONCENTRATE INTRAVENOUS AS NEEDED
Status: DISCONTINUED | OUTPATIENT
Start: 2021-07-23 | End: 2021-07-23 | Stop reason: HOSPADM

## 2021-07-23 RX ORDER — SODIUM CHLORIDE, SODIUM LACTATE, POTASSIUM CHLORIDE, CALCIUM CHLORIDE 600; 310; 30; 20 MG/100ML; MG/100ML; MG/100ML; MG/100ML
1000 INJECTION, SOLUTION INTRAVENOUS CONTINUOUS
Status: DISCONTINUED | OUTPATIENT
Start: 2021-07-23 | End: 2021-07-23 | Stop reason: HOSPADM

## 2021-07-23 RX ORDER — ONDANSETRON 2 MG/ML
4 INJECTION INTRAMUSCULAR; INTRAVENOUS ONCE AS NEEDED
Status: DISCONTINUED | OUTPATIENT
Start: 2021-07-23 | End: 2021-07-23 | Stop reason: HOSPADM

## 2021-07-23 RX ORDER — ONDANSETRON 2 MG/ML
INJECTION INTRAMUSCULAR; INTRAVENOUS AS NEEDED
Status: DISCONTINUED | OUTPATIENT
Start: 2021-07-23 | End: 2021-07-23 | Stop reason: SURG

## 2021-07-23 RX ADMIN — SODIUM CHLORIDE, POTASSIUM CHLORIDE, SODIUM LACTATE AND CALCIUM CHLORIDE 1000 ML: 600; 310; 30; 20 INJECTION, SOLUTION INTRAVENOUS at 06:30

## 2021-07-23 RX ADMIN — ROCURONIUM BROMIDE 25 MG: 50 INJECTION INTRAVENOUS at 07:31

## 2021-07-23 RX ADMIN — PROPOFOL 200 MG: 10 INJECTION, EMULSION INTRAVENOUS at 07:31

## 2021-07-23 RX ADMIN — FENTANYL CITRATE 100 MCG: 50 INJECTION, SOLUTION INTRAMUSCULAR; INTRAVENOUS at 07:31

## 2021-07-23 RX ADMIN — ONDANSETRON 4 MG: 2 INJECTION INTRAMUSCULAR; INTRAVENOUS at 08:03

## 2021-07-23 RX ADMIN — LIDOCAINE HYDROCHLORIDE 50 MG: 20 INJECTION, SOLUTION EPIDURAL; INFILTRATION; INTRACAUDAL; PERINEURAL at 07:31

## 2021-07-23 RX ADMIN — Medication 3 MG: at 08:03

## 2021-07-23 RX ADMIN — ACETAMINOPHEN 1000 MG: 500 TABLET, FILM COATED ORAL at 06:47

## 2021-07-23 RX ADMIN — GLYCOPYRROLATE 0.4 MG: 0.2 INJECTION, SOLUTION INTRAMUSCULAR; INTRAVENOUS at 08:03

## 2021-07-23 RX ADMIN — OXYCODONE HYDROCHLORIDE AND ACETAMINOPHEN 1 TABLET: 5; 325 TABLET ORAL at 08:45

## 2021-07-23 NOTE — ANESTHESIA PREPROCEDURE EVALUATION
Anesthesia Evaluation     history of anesthetic complications: PONV  NPO Solid Status: > 8 hours  NPO Liquid Status: > 8 hours           Airway   Mallampati: I  TM distance: >3 FB  Neck ROM: full  No difficulty expected  Dental      Pulmonary    (+) a smoker Current, sleep apnea,   Cardiovascular   Exercise tolerance: poor (<4 METS)    (+) hypertension, PVD, DVT resolved, hyperlipidemia,   (-) past MI, CAD      Neuro/Psych  GI/Hepatic/Renal/Endo    (+)   renal disease CRI, diabetes mellitus,   (-) liver disease    Musculoskeletal     (+) chronic pain,   Abdominal    Substance History      OB/GYN          Other   arthritis,                      Anesthesia Plan    ASA 3     general     intravenous induction     Anesthetic plan, all risks, benefits, and alternatives have been provided, discussed and informed consent has been obtained with: patient.

## 2021-07-23 NOTE — ANESTHESIA PROCEDURE NOTES
Airway  Urgency: elective    Date/Time: 7/23/2021 7:31 AM  Airway not difficult    General Information and Staff    Patient location during procedure: OR  CRNA: John Johnson CRNA    Indications and Patient Condition  Indications for airway management: airway protection    Preoxygenated: yes  MILS maintained throughout  Mask difficulty assessment: 1 - vent by mask    Final Airway Details  Final airway type: endotracheal airway      Successful airway: ETT and laser tube  Cuffed: yes   Successful intubation technique: direct laryngoscopy  Facilitating devices/methods: intubating stylet  Endotracheal tube insertion site: oral  Blade: Brian  Blade size: 3  ETT size (mm): 6.0  Cormack-Lehane Classification: grade I - full view of glottis  Placement verified by: chest auscultation and capnometry   Cuff volume (mL): 8  Measured from: lips  ETT/EBT  to lips (cm): 21  Number of attempts at approach: 1  Assessment: lips, teeth, and gum same as pre-op and atraumatic intubation

## 2021-07-23 NOTE — ANESTHESIA POSTPROCEDURE EVALUATION
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        Post Anesthesia Care and Evaluation    Patient location during evaluation: PACU  Patient participation: complete - patient participated  Level of consciousness: awake and alert  Pain management: adequate  Airway patency: patent  Anesthetic complications: No anesthetic complications    Cardiovascular status: acceptable  Respiratory status: acceptable  Hydration status: acceptable    Comments: Blood pressure 151/75, pulse 60, temperature 97.7 °F (36.5 °C), temperature source Temporal, resp. rate 20, SpO2 98 %, not currently breastfeeding.    Pt discharged from PACU based on miguelangel score >8

## 2021-07-23 NOTE — OP NOTE
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     Approximately 2 cc 1% Xylocaine with epinephrine was injected submucosally and laser excision accomplished with the Accu pulse CO2 laser.  Laser was on 8 W continuous power on continuous mode.  Excision of the anterior left floor mouth area of leukoplakia was accomplished without difficulty and the specimen submitted for permanent pathologic examination.  The area of leukoplakia was approximately 1.9 x 1.0 cm.  The excision was approximately 2.3 x 1.6 cm.  The margin was approximately 2 mm.  Minimal bleeding was encountered which was controlled with 8 W continuous laser defocused as well as electrocautery and low settings.  Mucosal reapproximation was accomplished with a running lock stitch of 3-0 chromic oversewn with interrupted 3-0 chromic.  The mouth was rinsed with copious amounts of normal saline solution and the procedure terminated.     The patient was transported upon extubation to the postanesthesia care unit in stable condition.    SPECIMENS:  A: Left anterior floor of mouth leukoplakia    COMPLICATIONS: NONE    ESTIMATED BLOOD LOSS:  Minimal    Peter Navarro MD  7/23/2021

## 2021-07-23 NOTE — DISCHARGE INSTRUCTIONS

## 2021-07-24 ENCOUNTER — APPOINTMENT (OUTPATIENT)
Dept: CT IMAGING | Facility: HOSPITAL | Age: 71
End: 2021-07-24

## 2021-07-24 ENCOUNTER — HOSPITAL ENCOUNTER (OUTPATIENT)
Facility: HOSPITAL | Age: 71
Setting detail: OBSERVATION
Discharge: HOME | End: 2021-07-26
Attending: FAMILY MEDICINE | Admitting: OTOLARYNGOLOGY

## 2021-07-24 ENCOUNTER — TELEPHONE (OUTPATIENT)
Dept: OTOLARYNGOLOGY | Facility: CLINIC | Age: 71
End: 2021-07-24

## 2021-07-24 DIAGNOSIS — K14.8 EDEMA OF THE TONGUE: Primary | ICD-10-CM

## 2021-07-24 PROBLEM — J98.8 AIRWAY COMPROMISE: Status: ACTIVE | Noted: 2021-07-24

## 2021-07-24 PROBLEM — R13.12 OROPHARYNGEAL DYSPHAGIA: Status: ACTIVE | Noted: 2021-07-24

## 2021-07-24 LAB
ALBUMIN SERPL-MCNC: 4 G/DL (ref 3.5–5.2)
ALBUMIN/GLOB SERPL: 1.4 G/DL
ALP SERPL-CCNC: 88 U/L (ref 39–117)
ALT SERPL W P-5'-P-CCNC: 11 U/L (ref 1–33)
ANION GAP SERPL CALCULATED.3IONS-SCNC: 12 MMOL/L (ref 5–15)
APTT PPP: 28.2 SECONDS (ref 24.1–35)
AST SERPL-CCNC: 9 U/L (ref 1–32)
BASOPHILS # BLD AUTO: 0.04 10*3/MM3 (ref 0–0.2)
BASOPHILS NFR BLD AUTO: 0.4 % (ref 0–1.5)
BILIRUB SERPL-MCNC: 0.4 MG/DL (ref 0–1.2)
BUN SERPL-MCNC: 14 MG/DL (ref 8–23)
BUN/CREAT SERPL: 14.4 (ref 7–25)
CALCIUM SPEC-SCNC: 9.2 MG/DL (ref 8.6–10.5)
CHLORIDE SERPL-SCNC: 105 MMOL/L (ref 98–107)
CO2 SERPL-SCNC: 23 MMOL/L (ref 22–29)
CREAT SERPL-MCNC: 0.97 MG/DL (ref 0.57–1)
DEPRECATED RDW RBC AUTO: 50.8 FL (ref 37–54)
EOSINOPHIL # BLD AUTO: 0.12 10*3/MM3 (ref 0–0.4)
EOSINOPHIL NFR BLD AUTO: 1.1 % (ref 0.3–6.2)
ERYTHROCYTE [DISTWIDTH] IN BLOOD BY AUTOMATED COUNT: 13.9 % (ref 12.3–15.4)
GFR SERPL CREATININE-BSD FRML MDRD: 56 ML/MIN/1.73
GLOBULIN UR ELPH-MCNC: 2.9 GM/DL
GLUCOSE BLDC GLUCOMTR-MCNC: 160 MG/DL (ref 70–130)
GLUCOSE BLDC GLUCOMTR-MCNC: 167 MG/DL (ref 70–130)
GLUCOSE BLDC GLUCOMTR-MCNC: 180 MG/DL (ref 70–130)
GLUCOSE BLDC GLUCOMTR-MCNC: 228 MG/DL (ref 70–130)
GLUCOSE SERPL-MCNC: 184 MG/DL (ref 65–99)
HCT VFR BLD AUTO: 41.3 % (ref 34–46.6)
HGB BLD-MCNC: 13.2 G/DL (ref 12–15.9)
HOLD SPECIMEN: NORMAL
HOLD SPECIMEN: NORMAL
IMM GRANULOCYTES # BLD AUTO: 0.06 10*3/MM3 (ref 0–0.05)
IMM GRANULOCYTES NFR BLD AUTO: 0.6 % (ref 0–0.5)
INR PPP: 1.01 (ref 0.91–1.09)
LYMPHOCYTES # BLD AUTO: 1.94 10*3/MM3 (ref 0.7–3.1)
LYMPHOCYTES NFR BLD AUTO: 18.1 % (ref 19.6–45.3)
MCH RBC QN AUTO: 31.7 PG (ref 26.6–33)
MCHC RBC AUTO-ENTMCNC: 32 G/DL (ref 31.5–35.7)
MCV RBC AUTO: 99.3 FL (ref 79–97)
MONOCYTES # BLD AUTO: 0.79 10*3/MM3 (ref 0.1–0.9)
MONOCYTES NFR BLD AUTO: 7.4 % (ref 5–12)
NEUTROPHILS NFR BLD AUTO: 7.75 10*3/MM3 (ref 1.7–7)
NEUTROPHILS NFR BLD AUTO: 72.4 % (ref 42.7–76)
NRBC BLD AUTO-RTO: 0 /100 WBC (ref 0–0.2)
PLATELET # BLD AUTO: 252 10*3/MM3 (ref 140–450)
PMV BLD AUTO: 9.1 FL (ref 6–12)
POTASSIUM SERPL-SCNC: 4.4 MMOL/L (ref 3.5–5.2)
PROT SERPL-MCNC: 6.9 G/DL (ref 6–8.5)
PROTHROMBIN TIME: 12.5 SECONDS (ref 11.5–13.4)
RBC # BLD AUTO: 4.16 10*6/MM3 (ref 3.77–5.28)
SARS-COV-2 RNA PNL SPEC NAA+PROBE: NOT DETECTED
SODIUM SERPL-SCNC: 140 MMOL/L (ref 136–145)
WBC # BLD AUTO: 10.7 10*3/MM3 (ref 3.4–10.8)
WHOLE BLOOD HOLD SPECIMEN: NORMAL

## 2021-07-24 PROCEDURE — 25010000002 MORPHINE SULFATE (PF) 2 MG/ML SOLUTION: Performed by: OTOLARYNGOLOGY

## 2021-07-24 PROCEDURE — C9803 HOPD COVID-19 SPEC COLLECT: HCPCS

## 2021-07-24 PROCEDURE — 96376 TX/PRO/DX INJ SAME DRUG ADON: CPT

## 2021-07-24 PROCEDURE — 87040 BLOOD CULTURE FOR BACTERIA: CPT | Performed by: FAMILY MEDICINE

## 2021-07-24 PROCEDURE — 85730 THROMBOPLASTIN TIME PARTIAL: CPT | Performed by: FAMILY MEDICINE

## 2021-07-24 PROCEDURE — G0378 HOSPITAL OBSERVATION PER HR: HCPCS

## 2021-07-24 PROCEDURE — 25010000002 DEXAMETHASONE PER 1 MG: Performed by: FAMILY MEDICINE

## 2021-07-24 PROCEDURE — 94799 UNLISTED PULMONARY SVC/PX: CPT

## 2021-07-24 PROCEDURE — 25010000002 DEXAMETHASONE PER 1 MG: Performed by: OTOLARYNGOLOGY

## 2021-07-24 PROCEDURE — 70491 CT SOFT TISSUE NECK W/DYE: CPT

## 2021-07-24 PROCEDURE — 99219 PR INITIAL OBSERVATION CARE/DAY 50 MINUTES: CPT | Performed by: OTOLARYNGOLOGY

## 2021-07-24 PROCEDURE — 25010000002 HYDRALAZINE PER 20 MG: Performed by: FAMILY MEDICINE

## 2021-07-24 PROCEDURE — 80053 COMPREHEN METABOLIC PANEL: CPT | Performed by: FAMILY MEDICINE

## 2021-07-24 PROCEDURE — 82962 GLUCOSE BLOOD TEST: CPT

## 2021-07-24 PROCEDURE — 25010000002 IOPAMIDOL 61 % SOLUTION: Performed by: FAMILY MEDICINE

## 2021-07-24 PROCEDURE — 96375 TX/PRO/DX INJ NEW DRUG ADDON: CPT

## 2021-07-24 PROCEDURE — 96374 THER/PROPH/DIAG INJ IV PUSH: CPT

## 2021-07-24 PROCEDURE — 96361 HYDRATE IV INFUSION ADD-ON: CPT

## 2021-07-24 PROCEDURE — 25010000002 SODIUM CHLORIDE 0.9 % WITH KCL 20 MEQ 20-0.9 MEQ/L-% SOLUTION: Performed by: OTOLARYNGOLOGY

## 2021-07-24 PROCEDURE — 85610 PROTHROMBIN TIME: CPT | Performed by: FAMILY MEDICINE

## 2021-07-24 PROCEDURE — 25010000002 FENTANYL CITRATE (PF) 50 MCG/ML SOLUTION: Performed by: FAMILY MEDICINE

## 2021-07-24 PROCEDURE — 87635 SARS-COV-2 COVID-19 AMP PRB: CPT | Performed by: FAMILY MEDICINE

## 2021-07-24 PROCEDURE — 99284 EMERGENCY DEPT VISIT MOD MDM: CPT

## 2021-07-24 PROCEDURE — 85025 COMPLETE CBC W/AUTO DIFF WBC: CPT | Performed by: FAMILY MEDICINE

## 2021-07-24 RX ORDER — DEXAMETHASONE SODIUM PHOSPHATE 10 MG/ML
10 INJECTION INTRAMUSCULAR; INTRAVENOUS ONCE
Status: COMPLETED | OUTPATIENT
Start: 2021-07-24 | End: 2021-07-24

## 2021-07-24 RX ORDER — LABETALOL HYDROCHLORIDE 5 MG/ML
10 INJECTION, SOLUTION INTRAVENOUS EVERY 4 HOURS PRN
Status: DISCONTINUED | OUTPATIENT
Start: 2021-07-24 | End: 2021-07-26 | Stop reason: HOSPADM

## 2021-07-24 RX ORDER — SODIUM CHLORIDE 0.9 % (FLUSH) 0.9 %
10 SYRINGE (ML) INJECTION AS NEEDED
Status: DISCONTINUED | OUTPATIENT
Start: 2021-07-24 | End: 2021-07-26 | Stop reason: HOSPADM

## 2021-07-24 RX ORDER — NICOTINE POLACRILEX 4 MG
15 LOZENGE BUCCAL
Status: DISCONTINUED | OUTPATIENT
Start: 2021-07-24 | End: 2021-07-26 | Stop reason: HOSPADM

## 2021-07-24 RX ORDER — SODIUM CHLORIDE 0.9 % (FLUSH) 0.9 %
3 SYRINGE (ML) INJECTION EVERY 12 HOURS SCHEDULED
Status: DISCONTINUED | OUTPATIENT
Start: 2021-07-24 | End: 2021-07-26 | Stop reason: HOSPADM

## 2021-07-24 RX ORDER — BISOPROLOL FUMARATE 5 MG/1
5 TABLET, FILM COATED ORAL DAILY
Status: DISCONTINUED | OUTPATIENT
Start: 2021-07-24 | End: 2021-07-24

## 2021-07-24 RX ORDER — HYDRALAZINE HYDROCHLORIDE 20 MG/ML
10 INJECTION INTRAMUSCULAR; INTRAVENOUS EVERY 4 HOURS PRN
Status: DISCONTINUED | OUTPATIENT
Start: 2021-07-24 | End: 2021-07-26 | Stop reason: HOSPADM

## 2021-07-24 RX ORDER — DULOXETIN HYDROCHLORIDE 30 MG/1
60 CAPSULE, DELAYED RELEASE ORAL DAILY
Status: DISCONTINUED | OUTPATIENT
Start: 2021-07-24 | End: 2021-07-24

## 2021-07-24 RX ORDER — TERAZOSIN 2 MG/1
2 CAPSULE ORAL NIGHTLY
Status: DISCONTINUED | OUTPATIENT
Start: 2021-07-24 | End: 2021-07-24

## 2021-07-24 RX ORDER — CHLORHEXIDINE GLUCONATE 0.12 MG/ML
15 RINSE ORAL 2 TIMES DAILY
Status: DISCONTINUED | OUTPATIENT
Start: 2021-07-24 | End: 2021-07-25

## 2021-07-24 RX ORDER — CLINDAMYCIN PHOSPHATE 900 MG/50ML
900 INJECTION INTRAVENOUS EVERY 8 HOURS
Status: COMPLETED | OUTPATIENT
Start: 2021-07-24 | End: 2021-07-25

## 2021-07-24 RX ORDER — LABETALOL HYDROCHLORIDE 5 MG/ML
10 INJECTION, SOLUTION INTRAVENOUS ONCE
Status: COMPLETED | OUTPATIENT
Start: 2021-07-24 | End: 2021-07-24

## 2021-07-24 RX ORDER — TIZANIDINE 4 MG/1
4 TABLET ORAL EVERY 8 HOURS PRN
Status: DISCONTINUED | OUTPATIENT
Start: 2021-07-24 | End: 2021-07-24

## 2021-07-24 RX ORDER — CLONIDINE HYDROCHLORIDE 0.1 MG/1
0.1 TABLET ORAL DAILY PRN
Status: DISCONTINUED | OUTPATIENT
Start: 2021-07-24 | End: 2021-07-24

## 2021-07-24 RX ORDER — SODIUM CHLORIDE AND POTASSIUM CHLORIDE 150; 900 MG/100ML; MG/100ML
75 INJECTION, SOLUTION INTRAVENOUS CONTINUOUS
Status: DISCONTINUED | OUTPATIENT
Start: 2021-07-24 | End: 2021-07-25

## 2021-07-24 RX ORDER — DEXTROSE MONOHYDRATE 25 G/50ML
25 INJECTION, SOLUTION INTRAVENOUS
Status: DISCONTINUED | OUTPATIENT
Start: 2021-07-24 | End: 2021-07-26 | Stop reason: HOSPADM

## 2021-07-24 RX ORDER — MORPHINE SULFATE 2 MG/ML
2 INJECTION, SOLUTION INTRAMUSCULAR; INTRAVENOUS EVERY 4 HOURS PRN
Status: DISCONTINUED | OUTPATIENT
Start: 2021-07-24 | End: 2021-07-25

## 2021-07-24 RX ORDER — DEXAMETHASONE SODIUM PHOSPHATE 10 MG/ML
8 INJECTION INTRAMUSCULAR; INTRAVENOUS EVERY 8 HOURS
Status: DISCONTINUED | OUTPATIENT
Start: 2021-07-24 | End: 2021-07-26 | Stop reason: HOSPADM

## 2021-07-24 RX ORDER — ATORVASTATIN CALCIUM 10 MG/1
20 TABLET, FILM COATED ORAL NIGHTLY
Status: DISCONTINUED | OUTPATIENT
Start: 2021-07-24 | End: 2021-07-24

## 2021-07-24 RX ORDER — FENTANYL CITRATE 50 UG/ML
50 INJECTION, SOLUTION INTRAMUSCULAR; INTRAVENOUS ONCE
Status: COMPLETED | OUTPATIENT
Start: 2021-07-24 | End: 2021-07-24

## 2021-07-24 RX ORDER — SODIUM CHLORIDE 9 MG/ML
125 INJECTION, SOLUTION INTRAVENOUS CONTINUOUS
Status: DISCONTINUED | OUTPATIENT
Start: 2021-07-24 | End: 2021-07-24

## 2021-07-24 RX ADMIN — LABETALOL HYDROCHLORIDE 10 MG: 5 INJECTION, SOLUTION INTRAVENOUS at 21:13

## 2021-07-24 RX ADMIN — DEXAMETHASONE SODIUM PHOSPHATE 10 MG: 10 INJECTION INTRAMUSCULAR; INTRAVENOUS at 13:03

## 2021-07-24 RX ADMIN — SODIUM CHLORIDE, PRESERVATIVE FREE 10 ML: 5 INJECTION INTRAVENOUS at 21:17

## 2021-07-24 RX ADMIN — SODIUM CHLORIDE, PRESERVATIVE FREE 3 ML: 5 INJECTION INTRAVENOUS at 14:54

## 2021-07-24 RX ADMIN — HYDRALAZINE HYDROCHLORIDE 10 MG: 20 INJECTION INTRAMUSCULAR; INTRAVENOUS at 17:51

## 2021-07-24 RX ADMIN — DEXAMETHASONE SODIUM PHOSPHATE 8 MG: 10 INJECTION INTRAMUSCULAR; INTRAVENOUS at 20:39

## 2021-07-24 RX ADMIN — POTASSIUM CHLORIDE AND SODIUM CHLORIDE 75 ML/HR: 900; 150 INJECTION, SOLUTION INTRAVENOUS at 14:54

## 2021-07-24 RX ADMIN — LABETALOL HYDROCHLORIDE 10 MG: 5 INJECTION, SOLUTION INTRAVENOUS at 14:55

## 2021-07-24 RX ADMIN — IOPAMIDOL 100 ML: 612 INJECTION, SOLUTION INTRAVENOUS at 13:54

## 2021-07-24 RX ADMIN — CLINDAMYCIN IN 5 PERCENT DEXTROSE 900 MG: 18 INJECTION, SOLUTION INTRAVENOUS at 14:54

## 2021-07-24 RX ADMIN — MORPHINE SULFATE 2 MG: 2 INJECTION, SOLUTION INTRAMUSCULAR; INTRAVENOUS at 20:39

## 2021-07-24 RX ADMIN — CLINDAMYCIN IN 5 PERCENT DEXTROSE 900 MG: 18 INJECTION, SOLUTION INTRAVENOUS at 20:38

## 2021-07-24 RX ADMIN — HYDROCODONE BITARTRATE AND ACETAMINOPHEN 10 ML: 7.5; 325 SOLUTION ORAL at 21:31

## 2021-07-24 RX ADMIN — SODIUM CHLORIDE 500 ML: 9 INJECTION, SOLUTION INTRAVENOUS at 12:23

## 2021-07-24 RX ADMIN — SODIUM CHLORIDE 125 ML/HR: 9 INJECTION, SOLUTION INTRAVENOUS at 13:28

## 2021-07-24 RX ADMIN — FENTANYL CITRATE 50 MCG: 50 INJECTION, SOLUTION INTRAMUSCULAR; INTRAVENOUS at 15:01

## 2021-07-24 RX ADMIN — SODIUM CHLORIDE, PRESERVATIVE FREE 3 ML: 5 INJECTION INTRAVENOUS at 20:40

## 2021-07-24 NOTE — ED PROVIDER NOTES
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  She did okay last night and slept through the night but this morning found her tongue and her oral cavity to be quite swollen and the swelling extended down into her neck.  This made it impossible for her to swallow any of her medications including her diabetic, hypertensive and pain medications.  She says that her breathing is okay if she is laying down but becomes more difficult if she stands up.  She denies fever.          Review of Systems   HENT: Positive for trouble swallowing.    All other systems reviewed and are negative.      Past Medical History:   Diagnosis Date   • Arthritis    • Chronic back pain    • Chronic kidney disease     stage 2   • Depression    • Diabetes mellitus (CMS/HCC)    • Elevated cholesterol    • H/O blood clots    • Hyperlipidemia    • Hypertension    • Leukoplakia of oral cavity    • PONV (postoperative nausea and vomiting)    • Spinal headache    • Vascular disease        Allergies   Allergen Reactions   • Codeine Itching   • Amoxicillin-Pot Clavulanate Nausea And Vomiting   • Ace Inhibitors Unknown (See Comments)     CAUSE PANCREAS TO BE INFLAMMED   • Advair Diskus [Fluticasone-Salmeterol] Myalgia   • Ascorbate    • Avapro [Irbesartan] Unknown (See Comments)     PANCREATITIS    • Hydrochlorothiazide Other (See Comments)     Affected pancreas   • Irbesartan-Hydrochlorothiazide Unknown (See Comments)     PANCREATITIS     • Simvastatin Myalgia   • Thiazide-Type Diuretics Unknown (See Comments)     PANCRAETITIS        Past Surgical History:   Procedure Laterality Date   • ANTERIOR CERVICAL DISCECTOMY W/ FUSION N/A 9/17/2018    Procedure: ANTERIOR CERVICAL DISCECTOMY FUSION C4-5, C6-7 WITH INSTRUMENTATION C4-7;  Surgeon: JAXSON Rm MD;  Location: [facility] OR;  Service: Orthopedic Spine   • APPENDECTOMY     • BACK SURGERY     • CARDIAC CATHETERIZATION     • CARPAL  TUNNEL RELEASE Bilateral    • CERVICAL FUSION      C5-6   •  SECTION     • CHOLECYSTECTOMY     • GANGLION CYST EXCISION Right    • HERNIA REPAIR  1992    VENTRAL    • HYSTERECTOMY     • JOINT REPLACEMENT Bilateral 2012 KNEES   • KNEE SURGERY Left     Cleaned joint out   • LAMINECTOMY THORACIC SPINE W/ PLACEMENT SPINAL CORD STIMULATOR     • LEG SURGERY      blood clot removed   • LUMBAR FUSION  2010    L2-5/L5S1   • NASAL SEPTUM SURGERY  2005   • NECK SURGERY     • RADIOFREQUENCY ABLATION Right 2014   • SHOULDER SURGERY Left     Fatty tumor removed   • SPINAL CORD STIMULATOR IMPLANT     • TONSILLECTOMY     • TRIGGER FINGER RELEASE Left    • VEIN LIGATION AND STRIPPING Left        Family History   Problem Relation Age of Onset   • Cancer Father    • Lung cancer Mother    • Heart disease Mother        Social History     Socioeconomic History   • Marital status:      Spouse name: Not on file   • Number of children: Not on file   • Years of education: Not on file   • Highest education level: Not on file   Tobacco Use   • Smoking status: Current Every Day Smoker     Packs/day: 1.00     Years: 46.00     Pack years: 46.00     Types: Cigarettes     Start date:    • Smokeless tobacco: Never Used   Vaping Use   • Vaping Use: Never used   Substance and Sexual Activity   • Alcohol use: No   • Drug use: Not Currently   • Sexual activity: Defer           Objective   Physical Exam  Vitals and nursing note reviewed.   Constitutional:       Appearance: She is well-developed.   HENT:      Head: Normocephalic and atraumatic.      Right Ear: External ear normal.      Left Ear: External ear normal.      Nose: Nose normal.      Mouth/Throat:      Mouth: Mucous membranes are moist.      Pharynx: Oropharynx is clear.      Comments: The tongue is markedly swollen and makes examination of the oropharynx impossible.  The patient's neck also seems to have some diffuse swelling without erythema or  warmth.  Eyes:      Conjunctiva/sclera: Conjunctivae normal.   Cardiovascular:      Rate and Rhythm: Normal rate and regular rhythm.      Heart sounds: Normal heart sounds.   Pulmonary:      Effort: Pulmonary effort is normal.      Breath sounds: Normal breath sounds.   Abdominal:      General: Bowel sounds are normal.      Palpations: Abdomen is soft.   Musculoskeletal:         General: Normal range of motion.      Cervical back: Normal range of motion and neck supple.   Skin:     General: Skin is warm and dry.      Capillary Refill: Capillary refill takes less than 2 seconds.   Neurological:      Mental Status: She is alert and oriented to person, place, and time.   Psychiatric:         Behavior: Behavior normal.         Thought Content: Thought content normal.         Judgment: Judgment normal.         Procedures           ED Course                                           MDM  Number of Diagnoses or Management Options     Amount and/or Complexity of Data Reviewed  Clinical lab tests: ordered and reviewed  Tests in the radiology section of CPT®: ordered and reviewed    Patient Progress  Patient progress: stable      Final diagnoses:   Edema of the tongue       ED Disposition  ED Disposition     ED Disposition Condition Comment    Decision to Admit  Level of Care: Critical Care [6]   Diagnosis: Tongue edema [080420]   Admitting Physician: LIDIA WINTER [[id number]]   Attending Physician: JERILYN SCOTT [[id number]]   Bed Request Comments: ICU            No follow-up provider specified.       Medication List      No changes were made to your prescriptions during this visit.       I discussed the case with Dr. Winter who kindly agreed to admit the patient under his care.  The patient received Decadron in the ED and is currently in stable condition.  Dr. Mock asked that we requested an inpatient hospitalist consultation to help manage medications and that was placed in the ED.     Roderick Lombardo,  MD  07/24/21 2920

## 2021-07-24 NOTE — H&P
"[facility]   HISTORY AND PHYSICAL    Patient Name:Blanca Martin  : 1950  MRN: [id number]  Primary Care Physician: Siva Lassiter MD  Date of admission: 2021    Subjective   Subjective     Chief Complaint: Trouble swallowing    History of Present Illness   Blanca Martin is a 71 y.o. female who is a patient of Dr. Peter Navarro who I am seeing in cross coverage.  She is status post [employer] Laryngoscopy With Laser Excision Of Lesion Of The Anterior Floor Of Mouth and Excision Of Lesion Of The Anterior Floor Of Mouth on 2021 by Dr. Peter Navarro.  She reports that overnight, she has had a tremendous amount of floor mouth and tongue swelling.  It got worse after this morning to the point where she is unable to swallow her secretions.  She has not had overt stridor or shortness of breath but does have occasional \"gurgling\" sounds due to her secretions.  She reports that initially she had a lot of pain but this has resolved.  She has a history of diabetes mellitus and chronic pain due to spinal disease.    Review of Systems   Constitutional: Negative.  Negative for fever.   HENT: Positive for drooling, sore throat, trouble swallowing and voice change.         As per HPI   Eyes: Negative.    Respiratory: Negative.  Negative for cough, shortness of breath, wheezing and stridor.    Cardiovascular: Negative.  Negative for chest pain.   Gastrointestinal: Negative.    Skin: Negative.    Neurological: Negative.    Hematological: Negative.    Psychiatric/Behavioral: Negative.          Personal History     Past Medical History:   Diagnosis Date   • Arthritis    • Chronic back pain    • Chronic kidney disease     stage 2   • Depression    • Diabetes mellitus (CMS/HCC)    • Elevated cholesterol    • H/O blood clots    • Hyperlipidemia    • Hypertension    • Leukoplakia of oral cavity    • PONV (postoperative nausea and vomiting)    • Spinal headache    • Vascular disease        Past Surgical " History:   Procedure Laterality Date   • ANTERIOR CERVICAL DISCECTOMY W/ FUSION N/A 2018    Procedure: ANTERIOR CERVICAL DISCECTOMY FUSION C4-5, C6-7 WITH INSTRUMENTATION C4-7;  Surgeon: JAXSON Rm MD;  Location: [facility];  Service: Orthopedic Spine   • APPENDECTOMY     • BACK SURGERY     • CARDIAC CATHETERIZATION     • CARPAL TUNNEL RELEASE Bilateral    • CERVICAL FUSION      C5-6   •  SECTION     • CHOLECYSTECTOMY     • GANGLION CYST EXCISION Right    • HERNIA REPAIR      VENTRAL    • HYSTERECTOMY     • JOINT REPLACEMENT Bilateral 2012 KNEES   • KNEE SURGERY Left     Cleaned joint out   • LAMINECTOMY THORACIC SPINE W/ PLACEMENT SPINAL CORD STIMULATOR     • LEG SURGERY      blood clot removed   • LUMBAR FUSION  2010    L2-5/L5S1   • NASAL SEPTUM SURGERY     • NECK SURGERY     • RADIOFREQUENCY ABLATION Right    • SHOULDER SURGERY Left     Fatty tumor removed   • SPINAL CORD STIMULATOR IMPLANT     • TONSILLECTOMY     • TRIGGER FINGER RELEASE Left    • VEIN LIGATION AND STRIPPING Left        Family History: Her family history includes Cancer in her father; Heart disease in her mother; Lung cancer in her mother.     Social History: She  reports that she has been smoking cigarettes. She started smoking about 41 years ago. She has a 46.00 pack-year smoking history. She has never used smokeless tobacco. She reports previous drug use. She reports that she does not drink alcohol.    Home Medications:  DULoxetine, Latanoprost, aspirin, atorvastatin, bisoprolol, busPIRone, chlorhexidine, cloNIDine, diclofenac, doxazosin, fluticasone, glimepiride, metFORMIN, oxyCODONE-acetaminophen, and tiZANidine    Allergies:  She is allergic to codeine, amoxicillin-pot clavulanate, ace inhibitors, advair diskus [fluticasone-salmeterol], ascorbate, avapro [irbesartan], hydrochlorothiazide, irbesartan-hydrochlorothiazide, simvastatin, and thiazide-type diuretics.    Objective     Objective     Vitals:    Temp:  [99.3 °F (37.4 °C)] 99.3 °F (37.4 °C)  Heart Rate:  [] 93  Resp:  [18] 18  BP: (140-156)/(77-80) 156/80    Physical Exam  Vitals reviewed.   Constitutional:       General: She is not in acute distress.     Appearance: Normal appearance. She is well-developed. She is obese. She is not toxic-appearing or diaphoretic.   HENT:      Head: Normocephalic and atraumatic.      Jaw: There is normal jaw occlusion. No swelling or malocclusion.      Salivary Glands: Right salivary gland is not diffusely enlarged or tender. Left salivary gland is not diffusely enlarged or tender.      Right Ear: Hearing, ear canal and external ear normal.      Left Ear: Hearing, ear canal and external ear normal.      Nose: Congestion present. No nasal deformity.      Mouth/Throat:      Lips: Pink. No lesions.      Mouth: Mucous membranes are dry. Angioedema (Moderate floor of mouth edema and erythema.  The tongue is pushed upwards but there is still visualization of the palate and the posterior pharynx.) present.     Eyes:      Extraocular Movements: Extraocular movements intact.      Conjunctiva/sclera: Conjunctivae normal.   Neck:      Thyroid: No thyroid mass.      Trachea: Trachea normal.     Cardiovascular:      Rate and Rhythm: Normal rate and regular rhythm.   Pulmonary:      Effort: Pulmonary effort is normal. No respiratory distress.      Breath sounds: No stridor.   Musculoskeletal:         General: Normal range of motion.      Cervical back: Edema present. No rigidity.   Lymphadenopathy:      Cervical: Cervical adenopathy present.   Skin:     Findings: No rash.   Neurological:      General: No focal deficit present.      Mental Status: She is alert.      Cranial Nerves: No cranial nerve deficit.   Psychiatric:         Behavior: Behavior normal.         Thought Content: Thought content normal.         Judgment: Judgment normal.          Result Review    Result Review:  I have personally reviewed  the results from the time of this admission to 7/24/2021 13:22 CDT and agree with these findings:  [x]  Laboratory  []  Microbiology  [x]  Radiology  []  EKG/Telemetry   []  Cardiology/Vascular   []  Pathology  []  Old records  []  Other:  Most notable findings include: White blood cell count was 10.7 with an H&H of 13.2 and 41.    Assessment/Plan   Assessment / Plan      Brief Patient Summary:  Blanca Martin is a 71 y.o. female who is status post floor mouth resection by Dr. Peter Navarro yesterday.  She presents to the emergency room today with significant for amount of swelling with inability to swallow and some early airway complaints.  I do not feel that she has an impending Jeremiah's or overt airway compromise as long as it does not get worse.    Active Hospital Problems:  Active Hospital Problems    Diagnosis    • **Tongue edema    • Oropharyngeal dysphagia    • Airway compromise    • Diabetes mellitus (CMS/HCC)    • Stage 2 chronic kidney disease    • Hypertension      Plan:   I feel the safest thing is to admit her to the ICU on steroids to observe throughout the day and overnight.  I have discussed in no uncertain terms that there is a concern that if this progresses that she will lose her airway and may need to have to  consider airway intubation and/or tracheostomy.  We will place her on steroids and perform a sliding scale for insulin coverage.    We will do Decadron 8 mg every 8 hours with clindamycin.  I will keep her n.p.o. except for clear liquids and her medicines.  We will keep her head of bed elevated.    DVT prophylaxis:  Mechanical DVT prophylaxis orders are present.     Electronically signed by John Mckinney MD, 07/24/21, 1:22 PM CDT.

## 2021-07-24 NOTE — CONSULTS
[facility] Medicine Consult  Inpatient Hospitalist Consult  Consult performed by: Jese Pascual MD  Consult ordered by: Roderick Lombardo MD          Date of Admission: 7/24/2021  Date of Consult: 07/24/21    Primary Care Physician: Siva Lassiter MD  Referring Physician: Dr. Mckinney  Chief Complaint/Reason for Consultation: Medical management/change medication IV.    Subjective   History of Present Illness  Patient is a 71-year-old  female was admitted for airway management due to status post laser incision of tongue lesion 7/23/2021.  Hold patient reported / tongue became more swollen.  Possible airway compromise.  Patient currently not requiring oxygen.  We are consulted for changing home medication to IV for now due to patient unable to swallow at this point.  Patient was transferred to the unit for evaluation and manage of airway.    Review of Systems   Constitutional: Positive for activity change, appetite change and fatigue. Negative for chills and fever.   HENT: Positive for mouth sores. Negative for hearing loss, nosebleeds, tinnitus and trouble swallowing.         Tongue edema and throat edema.   Eyes: Negative for visual disturbance.   Respiratory: Positive for choking. Negative for cough, chest tightness, shortness of breath and wheezing.    Cardiovascular: Negative for chest pain, palpitations and leg swelling.   Gastrointestinal: Negative for abdominal distention, abdominal pain, blood in stool, constipation, diarrhea, nausea and vomiting.   Endocrine: Negative for cold intolerance, heat intolerance, polydipsia, polyphagia and polyuria.   Genitourinary: Negative for decreased urine volume, difficulty urinating, dysuria, flank pain, frequency and hematuria.   Musculoskeletal: Negative for arthralgias, joint swelling and myalgias.   Skin: Negative for rash.   Allergic/Immunologic: Negative for immunocompromised state.   Neurological: Positive for  weakness. Negative for dizziness, syncope, light-headedness and headaches.   Hematological: Negative for adenopathy. Does not bruise/bleed easily.   Psychiatric/Behavioral: Negative for confusion and sleep disturbance. The patient is not nervous/anxious.       Otherwise complete ROS is negative except as mentioned above.    Past Medical History:   Past Medical History:   Diagnosis Date   • Arthritis    • Chronic back pain    • Chronic kidney disease     stage 2   • Depression    • Diabetes mellitus (CMS/HCC)    • Elevated cholesterol    • H/O blood clots    • Hyperlipidemia    • Hypertension    • Leukoplakia of oral cavity    • PONV (postoperative nausea and vomiting)    • Spinal headache    • Vascular disease      Past Surgical History:  Past Surgical History:   Procedure Laterality Date   • ANTERIOR CERVICAL DISCECTOMY W/ FUSION N/A 2018    Procedure: ANTERIOR CERVICAL DISCECTOMY FUSION C4-5, C6-7 WITH INSTRUMENTATION C4-7;  Surgeon: JAXSON Rm MD;  Location: [facility];  Service: Orthopedic Spine   • APPENDECTOMY     • BACK SURGERY     • CARDIAC CATHETERIZATION     • CARPAL TUNNEL RELEASE Bilateral    • CERVICAL FUSION      C5-6   •  SECTION     • CHOLECYSTECTOMY     • GANGLION CYST EXCISION Right    • HERNIA REPAIR      VENTRAL    • HYSTERECTOMY     • JOINT REPLACEMENT Bilateral 2012 KNEES   • KNEE SURGERY Left     Cleaned joint out   • LAMINECTOMY THORACIC SPINE W/ PLACEMENT SPINAL CORD STIMULATOR     • LEG SURGERY      blood clot removed   • LUMBAR FUSION  2010    L2-5/L5S1   • NASAL SEPTUM SURGERY     • NECK SURGERY     • RADIOFREQUENCY ABLATION Right    • SHOULDER SURGERY Left     Fatty tumor removed   • SPINAL CORD STIMULATOR IMPLANT     • TONSILLECTOMY     • TRIGGER FINGER RELEASE Left    • VEIN LIGATION AND STRIPPING Left      Social History:  reports that she has been smoking cigarettes. She started smoking about 41 years ago. She has a 46.00  pack-year smoking history. She has never used smokeless tobacco. She reports previous drug use. She reports that she does not drink alcohol.    Family History: family history includes Cancer in her father; Heart disease in her mother; Lung cancer in her mother.     Allergies:   Allergies   Allergen Reactions   • Codeine Itching   • Amoxicillin-Pot Clavulanate Nausea And Vomiting   • Ace Inhibitors Unknown (See Comments)     CAUSE PANCREAS TO BE INFLAMMED   • Advair Diskus [Fluticasone-Salmeterol] Myalgia   • Ascorbate    • Avapro [Irbesartan] Unknown (See Comments)     PANCREATITIS    • Hydrochlorothiazide Other (See Comments)     Affected pancreas   • Irbesartan-Hydrochlorothiazide Unknown (See Comments)     PANCREATITIS     • Simvastatin Myalgia   • Thiazide-Type Diuretics Unknown (See Comments)     PANCRAETITIS      Medications: Scheduled Meds:atorvastatin, 20 mg, Oral, Nightly  bisoprolol, 5 mg, Oral, Daily  chlorhexidine, 15 mL, Mouth/Throat, BID  clindamycin, 900 mg, Intravenous, Q8H  dexamethasone, 8 mg, Intravenous, Q8H  DULoxetine, 60 mg, Oral, Daily  insulin lispro, 0-9 Units, Subcutaneous, TID AC  metFORMIN, 1,000 mg, Oral, BID  sodium chloride, 3 mL, Intravenous, Q12H  terazosin, 2 mg, Oral, Nightly      Continuous Infusions:sodium chloride, 125 mL/hr, Last Rate: 125 mL/hr (07/24/21 1328)  sodium chloride 0.9 % with KCl 20 mEq, 75 mL/hr, Last Rate: 75 mL/hr (07/24/21 1454)      PRN Meds:.busPIRone  •  cloNIDine  •  dextrose  •  dextrose  •  glucagon (human recombinant)  •  HYDROcodone-acetaminophen  •  sodium chloride  •  tiZANidine    Objective   Objective    Physical Exam:   Temp:  [99 °F (37.2 °C)-99.3 °F (37.4 °C)] 99 °F (37.2 °C)  Heart Rate:  [] 99  Resp:  [18] 18  BP: (140-197)/(70-87) 172/84  Physical Exam  Vitals and nursing note reviewed.   Constitutional:       Appearance: She is well-developed.   HENT:      Head: Normocephalic and atraumatic.      Mouth/Throat:      Comments: Edema .  Tongue/neck.  Eyes:      Conjunctiva/sclera: Conjunctivae normal.      Pupils: Pupils are equal, round, and reactive to light.   Neck:      Vascular: No JVD.   Cardiovascular:      Rate and Rhythm: Normal rate and regular rhythm.      Heart sounds: Normal heart sounds. No murmur heard.   No friction rub. No gallop.    Pulmonary:      Effort: Pulmonary effort is normal. No respiratory distress.      Breath sounds: Normal breath sounds. No wheezing or rales.   Chest:      Chest wall: No tenderness.   Abdominal:      General: Bowel sounds are normal. There is no distension.      Palpations: Abdomen is soft.      Tenderness: There is no abdominal tenderness. There is no guarding or rebound.   Musculoskeletal:         General: No tenderness or deformity. Normal range of motion.      Cervical back: Neck supple.   Skin:     General: Skin is warm and dry.      Capillary Refill: Capillary refill takes 2 to 3 seconds.      Findings: No rash.   Neurological:      Mental Status: She is alert and oriented to person, place, and time.      Cranial Nerves: No cranial nerve deficit.      Motor: Weakness present. No abnormal muscle tone.      Deep Tendon Reflexes: Reflexes normal.   Psychiatric:         Behavior: Behavior normal.         Thought Content: Thought content normal.         Judgment: Judgment normal.           Results Reviewed:  I have personally reviewed current lab, radiology, and data and agree with results.  Lab Results (last 24 hours)     Procedure Component Value Units Date/Time    COVID PRE-OP / PRE-PROCEDURE SCREENING ORDER (NO ISOLATION) - Swab, Nasal Cavity [[id number]]  (Normal) Collected: 07/24/21 1421    Specimen: Swab from Nasal Cavity Updated: 07/24/21 1529    Narrative:      The following orders were created for panel order COVID PRE-OP / PRE-PROCEDURE SCREENING ORDER (NO ISOLATION) - Swab, Nasal Cavity.  Procedure                               Abnormality         Status                     ---------                                -----------         ------                     COVID-19,[facility] [employer] [facility]...[[id number]]  Normal              Final result                 Please view results for these tests on the individual orders.    COVID-19,[facility] [employer] IN-HOUSE,Nasal Swab No Transport Media 3-4 HR TAT - Swab, Nasal Cavity [[id number]]  (Normal) Collected: 07/24/21 1421    Specimen: Swab from Nasal Cavity Updated: 07/24/21 1529     COVID19 Not Detected    Narrative:      Fact sheet for providers: https://www.fda.gov/media/502465/download     Fact sheet for patients: https://www.fda.gov/media/583234/download    Test performed by PCR.    Consider negative results in combination with clinical observations, patient history, and epidemiological information.  Fact sheet for providers: https://www.fda.gov/media/188892/download     Fact sheet for patients: https://www.fda.gov/media/976010/download    Test performed by PCR.    Consider negative results in combination with clinical observations, patient history, and epidemiological information.    POC Glucose Once [[id number]]  (Abnormal) Collected: 07/24/21 1511    Specimen: Blood Updated: 07/24/21 1524     Glucose 167 mg/dL      Comment: : [id number] Yomi Hansen ID: [id number]       [place] Draw [[id number]] Collected: 07/24/21 1211    Specimen: Blood Updated: 07/24/21 1315    Narrative:      The following orders were created for panel order [place] Draw.  Procedure                               Abnormality         Status                     ---------                               -----------         ------                     Green Top (Gel)[[id number]]                                  Final result               Lavender Top[[id number]]                                     Final result               Red Top[[id number]]                                          Final result                 Please view results for these tests on the individual orders.    Lavender Top [[id number]]  Collected: 07/24/21 1211    Specimen: Blood Updated: 07/24/21 1315     Extra Tube hold for add-on     Comment: Auto resulted       Red Top [[id number]] Collected: 07/24/21 1211    Specimen: Blood Updated: 07/24/21 1315     Extra Tube Hold for add-ons.     Comment: Auto resulted.       Green Top (Gel) [[id number]] Collected: 07/24/21 1211    Specimen: Blood Updated: 07/24/21 1315     Extra Tube Hold for add-ons.     Comment: Auto resulted.       Comprehensive Metabolic Panel [[id number]]  (Abnormal) Collected: 07/24/21 1211    Specimen: Blood Updated: 07/24/21 1314     Glucose 184 mg/dL      BUN 14 mg/dL      Creatinine 0.97 mg/dL      Sodium 140 mmol/L      Potassium 4.4 mmol/L      Chloride 105 mmol/L      CO2 23.0 mmol/L      Calcium 9.2 mg/dL      Total Protein 6.9 g/dL      Albumin 4.00 g/dL      ALT (SGPT) 11 U/L      AST (SGOT) 9 U/L      Alkaline Phosphatase 88 U/L      Total Bilirubin 0.4 mg/dL      eGFR Non African Amer 56 mL/min/1.73      Globulin 2.9 gm/dL      A/G Ratio 1.4 g/dL      BUN/Creatinine Ratio 14.4     Anion Gap 12.0 mmol/L     Narrative:      GFR Normal >60  Chronic Kidney Disease <60  Kidney Failure <15      Protime-INR [[id number]]  (Normal) Collected: 07/24/21 1223    Specimen: Blood Updated: 07/24/21 1304     Protime 12.5 Seconds      INR 1.01    aPTT [[id number]]  (Normal) Collected: 07/24/21 1223    Specimen: Blood Updated: 07/24/21 1304     PTT 28.2 seconds     CBC & Differential [[id number]]  (Abnormal) Collected: 07/24/21 1211    Specimen: Blood Updated: 07/24/21 1249    Narrative:      The following orders were created for panel order CBC & Differential.  Procedure                               Abnormality         Status                     ---------                               -----------         ------                     CBC Auto Differential[[id number]]        Abnormal            Final result                 Please view results for these tests on the individual orders.    CBC Auto  Differential [[id number]]  (Abnormal) Collected: 07/24/21 1211    Specimen: Blood Updated: 07/24/21 1249     WBC 10.70 10*3/mm3      RBC 4.16 10*6/mm3      Hemoglobin 13.2 g/dL      Hematocrit 41.3 %      MCV 99.3 fL      MCH 31.7 pg      MCHC 32.0 g/dL      RDW 13.9 %      RDW-SD 50.8 fl      MPV 9.1 fL      Platelets 252 10*3/mm3      Neutrophil % 72.4 %      Lymphocyte % 18.1 %      Monocyte % 7.4 %      Eosinophil % 1.1 %      Basophil % 0.4 %      Immature Grans % 0.6 %      Neutrophils, Absolute 7.75 10*3/mm3      Lymphocytes, Absolute 1.94 10*3/mm3      Monocytes, Absolute 0.79 10*3/mm3      Eosinophils, Absolute 0.12 10*3/mm3      Basophils, Absolute 0.04 10*3/mm3      Immature Grans, Absolute 0.06 10*3/mm3      nRBC 0.0 /100 WBC     POC Glucose Once [[id number]]  (Abnormal) Collected: 07/24/21 1201    Specimen: Blood Updated: 07/24/21 1212     Glucose 160 mg/dL      [id number]           Imaging Results (Last 24 Hours)     Procedure Component Value Units Date/Time    CT Soft Tissue Neck With Contrast [[id number]] Collected: 07/24/21 1402     Updated: 07/24/21 1411    Narrative:      EXAMINATION:   CT SOFT TISSUE NECK W CONTRAST-  7/24/2021 2:02 PM CDT     HISTORY: CT SOFT TISSUE NECK W CONTRAST- 7/24/2021 1:26 PM CDT     HISTORY: Tongue surgery yesterday now with marked swelling and  difficulty swallowing      COMPARISON: NONE      DOSE LENGTH PRODUCT: 227 mGy cm. Automated exposure control was also  utilized to decrease patient radiation dose.     TECHNIQUE: Serial helical tomographic images of the neck were obtained  in the standard fashion following the intravenous administration of  Isovue contrast.       FINDINGS:    Orbits, paranasal sinuses, and skull base: Normal     Nasopharynx: Normal     Suprahyoid neck: In the oral pharynx and tongue is swollen. Anteriorly  in the floor the mouth a rim-like enhancing 10 mm low-density lesion is  present. This is  postsurgical change however a small abscess cannot BE  excluded     Infrahyoid neck: Normal larynx, hypopharynx and supraglottis.     Thyroid: Normal.     Thoracic inlet: Normal lung apices and brachial plexus.     Lymph nodes: Normal. No lymphadenopathy.     Vascular structures: Vascular calcifications noted bifurcation of the  right and left carotid artery. Using NASCET criteria there is no  significant     Bones: Postsurgical changes noted from anterior cervical fusion C4-C7     Other findings: None.       Impression:      1. 10 mm rim-like enhancing lesion in the left side of the floor the  mouth. This may be postsurgical change however very small abscess cannot  BE excluded        This report was finalized on 07/24/2021 14:08 by Dr. Danny Ruiz MD.          Assessment / Plan   Assessment:     Active Hospital Problems    Diagnosis    • **Tongue edema    • Oropharyngeal dysphagia    • Airway compromise    • Diabetes mellitus (CMS/HCC)    • Stage 2 chronic kidney disease    • Hypertension             Plan:   Tongue edema/oropharyngeal dysphagia/airway compromise.  Per ENT.  Decadron and clindamycin.  Blood culture.    Hypertension/hyperlipidemia.  Hold Lipitor.  Hold zebeta.Labetalol as needed.  Hydralazine as needed.  Hold clonidine.    Depression/anxiety.  Hold Cymbalta.  Hold BuSpar as needed..    Diabetes.  Sliding scale.  Hemoglobin A1C.  Home Metformin.      History of stage II renal failure/urinary retention.  Hold Hydrin.    Chronic pain. Hycet.  Hold Zanaflex.    I discussed the patient's findings and my recommendations with discussed with patient..    Advanced care planning/code status: Full code.  The patient's surrogate decision maker is Rayne De La Cruz, patient's daughter.     Patient seen and examined by me on 7/24/2021 at 4:28 PM.    Electronically signed by Jese Pascual MD, 07/24/21, 16:05 CDT.

## 2021-07-24 NOTE — TELEPHONE ENCOUNTER
"Blanca Martin is a  71 y.o. female is a patient of Dr. Peter Navarro MD who is s/p recent  [employer] Laryngoscopy With Laser Excision Of Lesion Of The Anterior Floor Of Mouth and Excision Of Lesion Of The Anterior Floor Of Mouth yesterday.  Her sister calls with complaints of swelling and dysphagia..  Her sister reports that her swelling has gotten to the point where she cannot swallow her medicines her secretions well.  She is starting to have some \"gurgling\" noise to her breathing.  She currently is not in distress.    I have recommended going to the emergency room for evaluation due to the potential for airway obstruction.    Electronically signed by John Mckinney MD, 07/24/21, 11:18 AM CDT.      "

## 2021-07-24 NOTE — ED NOTES
Phone call placed to Dr. Mckinney in regards to patients admit orders. Patient is unable to swallow medications at this time. Patient is hypertensive and unable to take oral medications. Verbal orders to contact hospitalist for medical management.      Nirmal Celaya RN  07/24/21 [id number]

## 2021-07-24 NOTE — ED NOTES
Insulin order scheduled for 3 times daily. Order placed at 1324. Next scheduled does at 1730. Will resume insulin order at this time to follow schedule. Current glucose 166.      Nirmal Celaya RN  07/24/21 [id number]

## 2021-07-24 NOTE — ED NOTES
Patient is a 71 year old female that presents to ER with swelling to tongue and neck following a tongue biopsy yesterday. Patient reports that she was released yesterday following the procedure and discharged home. Patient reports that over the night and into early morning she started experiencing increased swelling. Patient reports difficulty swallowing secretions at this time and gurgling is heard at times to back of throat. Patient is maintaining airway on own this time. Patient sitting up right position and able to spit secretions into emesis bag.      Nirmal Celaya, RN  07/24/21 [id number]

## 2021-07-25 LAB
ANION GAP SERPL CALCULATED.3IONS-SCNC: 15 MMOL/L (ref 5–15)
BASOPHILS # BLD AUTO: 0.01 10*3/MM3 (ref 0–0.2)
BASOPHILS NFR BLD AUTO: 0.1 % (ref 0–1.5)
BUN SERPL-MCNC: 19 MG/DL (ref 8–23)
BUN/CREAT SERPL: 19 (ref 7–25)
CALCIUM SPEC-SCNC: 9 MG/DL (ref 8.6–10.5)
CHLORIDE SERPL-SCNC: 105 MMOL/L (ref 98–107)
CHOLEST SERPL-MCNC: 110 MG/DL (ref 0–200)
CO2 SERPL-SCNC: 21 MMOL/L (ref 22–29)
CREAT SERPL-MCNC: 1 MG/DL (ref 0.57–1)
DEPRECATED RDW RBC AUTO: 51.7 FL (ref 37–54)
EOSINOPHIL # BLD AUTO: 0 10*3/MM3 (ref 0–0.4)
EOSINOPHIL NFR BLD AUTO: 0 % (ref 0.3–6.2)
ERYTHROCYTE [DISTWIDTH] IN BLOOD BY AUTOMATED COUNT: 14 % (ref 12.3–15.4)
GFR SERPL CREATININE-BSD FRML MDRD: 54 ML/MIN/1.73
GLUCOSE BLDC GLUCOMTR-MCNC: 220 MG/DL (ref 70–130)
GLUCOSE BLDC GLUCOMTR-MCNC: 243 MG/DL (ref 70–130)
GLUCOSE BLDC GLUCOMTR-MCNC: 248 MG/DL (ref 70–130)
GLUCOSE BLDC GLUCOMTR-MCNC: 280 MG/DL (ref 70–130)
GLUCOSE SERPL-MCNC: 282 MG/DL (ref 65–99)
HBA1C MFR BLD: 7.4 % (ref 4.8–5.6)
HCT VFR BLD AUTO: 38.6 % (ref 34–46.6)
HDLC SERPL-MCNC: 38 MG/DL (ref 40–60)
HGB BLD-MCNC: 12.5 G/DL (ref 12–15.9)
IMM GRANULOCYTES # BLD AUTO: 0.05 10*3/MM3 (ref 0–0.05)
IMM GRANULOCYTES NFR BLD AUTO: 0.7 % (ref 0–0.5)
LDLC SERPL CALC-MCNC: 57 MG/DL (ref 0–100)
LDLC/HDLC SERPL: 1.5 {RATIO}
LYMPHOCYTES # BLD AUTO: 0.69 10*3/MM3 (ref 0.7–3.1)
LYMPHOCYTES NFR BLD AUTO: 9.1 % (ref 19.6–45.3)
MCH RBC QN AUTO: 32.4 PG (ref 26.6–33)
MCHC RBC AUTO-ENTMCNC: 32.4 G/DL (ref 31.5–35.7)
MCV RBC AUTO: 100 FL (ref 79–97)
MONOCYTES # BLD AUTO: 0.03 10*3/MM3 (ref 0.1–0.9)
MONOCYTES NFR BLD AUTO: 0.4 % (ref 5–12)
NEUTROPHILS NFR BLD AUTO: 6.83 10*3/MM3 (ref 1.7–7)
NEUTROPHILS NFR BLD AUTO: 89.7 % (ref 42.7–76)
NRBC BLD AUTO-RTO: 0 /100 WBC (ref 0–0.2)
PLATELET # BLD AUTO: 254 10*3/MM3 (ref 140–450)
PMV BLD AUTO: 9.3 FL (ref 6–12)
POTASSIUM SERPL-SCNC: 4.4 MMOL/L (ref 3.5–5.2)
RBC # BLD AUTO: 3.86 10*6/MM3 (ref 3.77–5.28)
SODIUM SERPL-SCNC: 141 MMOL/L (ref 136–145)
T4 FREE SERPL-MCNC: 1.17 NG/DL (ref 0.93–1.7)
TRIGL SERPL-MCNC: 75 MG/DL (ref 0–150)
TSH SERPL DL<=0.05 MIU/L-ACNC: 0.21 UIU/ML (ref 0.27–4.2)
VLDLC SERPL-MCNC: 15 MG/DL (ref 5–40)
WBC # BLD AUTO: 7.61 10*3/MM3 (ref 3.4–10.8)

## 2021-07-25 PROCEDURE — 63710000001 INSULIN LISPRO (HUMAN) PER 5 UNITS: Performed by: OTOLARYNGOLOGY

## 2021-07-25 PROCEDURE — 96376 TX/PRO/DX INJ SAME DRUG ADON: CPT

## 2021-07-25 PROCEDURE — G0378 HOSPITAL OBSERVATION PER HR: HCPCS

## 2021-07-25 PROCEDURE — 99225 PR SBSQ OBSERVATION CARE/DAY 25 MINUTES: CPT | Performed by: OTOLARYNGOLOGY

## 2021-07-25 PROCEDURE — 84443 ASSAY THYROID STIM HORMONE: CPT | Performed by: FAMILY MEDICINE

## 2021-07-25 PROCEDURE — 94799 UNLISTED PULMONARY SVC/PX: CPT

## 2021-07-25 PROCEDURE — 84439 ASSAY OF FREE THYROXINE: CPT | Performed by: FAMILY MEDICINE

## 2021-07-25 PROCEDURE — 83036 HEMOGLOBIN GLYCOSYLATED A1C: CPT | Performed by: FAMILY MEDICINE

## 2021-07-25 PROCEDURE — 25010000002 SODIUM CHLORIDE 0.9 % WITH KCL 20 MEQ 20-0.9 MEQ/L-% SOLUTION: Performed by: OTOLARYNGOLOGY

## 2021-07-25 PROCEDURE — 80048 BASIC METABOLIC PNL TOTAL CA: CPT | Performed by: FAMILY MEDICINE

## 2021-07-25 PROCEDURE — 80061 LIPID PANEL: CPT | Performed by: FAMILY MEDICINE

## 2021-07-25 PROCEDURE — 96361 HYDRATE IV INFUSION ADD-ON: CPT

## 2021-07-25 PROCEDURE — 25010000002 DEXAMETHASONE PER 1 MG: Performed by: OTOLARYNGOLOGY

## 2021-07-25 PROCEDURE — 82962 GLUCOSE BLOOD TEST: CPT

## 2021-07-25 PROCEDURE — 85025 COMPLETE CBC W/AUTO DIFF WBC: CPT | Performed by: FAMILY MEDICINE

## 2021-07-25 RX ORDER — ASPIRIN 81 MG/1
81 TABLET ORAL DAILY
Status: DISCONTINUED | OUTPATIENT
Start: 2021-07-25 | End: 2021-07-26 | Stop reason: HOSPADM

## 2021-07-25 RX ORDER — ATORVASTATIN CALCIUM 10 MG/1
20 TABLET, FILM COATED ORAL NIGHTLY
Status: DISCONTINUED | OUTPATIENT
Start: 2021-07-25 | End: 2021-07-26 | Stop reason: HOSPADM

## 2021-07-25 RX ORDER — BISOPROLOL FUMARATE 5 MG/1
5 TABLET, FILM COATED ORAL
Status: DISCONTINUED | OUTPATIENT
Start: 2021-07-25 | End: 2021-07-26 | Stop reason: HOSPADM

## 2021-07-25 RX ORDER — DULOXETIN HYDROCHLORIDE 30 MG/1
60 CAPSULE, DELAYED RELEASE ORAL DAILY
Status: DISCONTINUED | OUTPATIENT
Start: 2021-07-25 | End: 2021-07-26 | Stop reason: HOSPADM

## 2021-07-25 RX ORDER — CLONIDINE HYDROCHLORIDE 0.1 MG/1
0.1 TABLET ORAL DAILY PRN
Status: DISCONTINUED | OUTPATIENT
Start: 2021-07-25 | End: 2021-07-26 | Stop reason: HOSPADM

## 2021-07-25 RX ADMIN — ATORVASTATIN CALCIUM 20 MG: 10 TABLET, FILM COATED ORAL at 20:55

## 2021-07-25 RX ADMIN — POTASSIUM CHLORIDE AND SODIUM CHLORIDE 75 ML/HR: 900; 150 INJECTION, SOLUTION INTRAVENOUS at 05:25

## 2021-07-25 RX ADMIN — CLINDAMYCIN IN 5 PERCENT DEXTROSE 900 MG: 18 INJECTION, SOLUTION INTRAVENOUS at 05:25

## 2021-07-25 RX ADMIN — METFORMIN HYDROCHLORIDE 1000 MG: 500 TABLET ORAL at 09:57

## 2021-07-25 RX ADMIN — HYDROCODONE BITARTRATE AND ACETAMINOPHEN 7.5 ML: 7.5; 325 SOLUTION ORAL at 08:12

## 2021-07-25 RX ADMIN — DULOXETINE HYDROCHLORIDE 60 MG: 30 CAPSULE, DELAYED RELEASE ORAL at 09:56

## 2021-07-25 RX ADMIN — ASPIRIN 81 MG: 81 TABLET, COATED ORAL at 09:56

## 2021-07-25 RX ADMIN — SODIUM CHLORIDE, PRESERVATIVE FREE 3 ML: 5 INJECTION INTRAVENOUS at 20:55

## 2021-07-25 RX ADMIN — BISOPROLOL FUMARATE 5 MG: 5 TABLET, FILM COATED ORAL at 09:56

## 2021-07-25 RX ADMIN — HYDROCODONE BITARTRATE AND ACETAMINOPHEN 10 ML: 7.5; 325 SOLUTION ORAL at 12:31

## 2021-07-25 RX ADMIN — DEXAMETHASONE SODIUM PHOSPHATE 8 MG: 10 INJECTION INTRAMUSCULAR; INTRAVENOUS at 13:51

## 2021-07-25 RX ADMIN — SODIUM CHLORIDE, PRESERVATIVE FREE 10 ML: 5 INJECTION INTRAVENOUS at 07:48

## 2021-07-25 RX ADMIN — INSULIN LISPRO 4 UNITS: 100 INJECTION, SOLUTION INTRAVENOUS; SUBCUTANEOUS at 16:44

## 2021-07-25 RX ADMIN — DEXAMETHASONE SODIUM PHOSPHATE 8 MG: 10 INJECTION INTRAMUSCULAR; INTRAVENOUS at 20:55

## 2021-07-25 RX ADMIN — DEXAMETHASONE SODIUM PHOSPHATE 8 MG: 10 INJECTION INTRAMUSCULAR; INTRAVENOUS at 05:24

## 2021-07-25 RX ADMIN — HYDROCODONE BITARTRATE AND ACETAMINOPHEN 10 ML: 7.5; 325 SOLUTION ORAL at 22:44

## 2021-07-25 RX ADMIN — INSULIN LISPRO 4 UNITS: 100 INJECTION, SOLUTION INTRAVENOUS; SUBCUTANEOUS at 07:47

## 2021-07-25 RX ADMIN — INSULIN LISPRO 6 UNITS: 100 INJECTION, SOLUTION INTRAVENOUS; SUBCUTANEOUS at 11:15

## 2021-07-25 NOTE — PROGRESS NOTES
[facility]   Surgical Progress Note    Patient Name: Blanca Martin  : 1950  MRN: [id number]  Date of admission: 2021      Subjective   Subjective     Chief Complaint: Tongue swelling    History of Present Illness   She has done well overnight.  She is beginning to be able to tolerate liquids.  She has not had airway complaints.  Her tongue swelling has improved.  Her pain has been controlled.    Review of Systems   Constitutional: Negative for chills and fever.   HENT: Positive for trouble swallowing. Negative for drooling.         As per HPI   Respiratory: Negative for wheezing and stridor.    Gastrointestinal: Negative for nausea and vomiting.       Objective   Objective     Vitals:   Temp:  [97.1 °F (36.2 °C)-99.3 °F (37.4 °C)] 97.1 °F (36.2 °C)  Heart Rate:  [] 87  Resp:  [15-25] 24  BP: (132-197)/(53-94) 168/75  Flow (L/min):  [8] 8    Physical Exam  Vitals reviewed.   Constitutional:       General: She is not in acute distress.     Appearance: Normal appearance. She is well-developed. She is obese. She is not ill-appearing or toxic-appearing.   HENT:      Head: Normocephalic and atraumatic.      Right Ear: External ear normal.      Left Ear: External ear normal.      Nose: Nose normal.      Mouth/Throat:      Comments: There is improvement of her tongue swelling.  It is still present but mild in nature.  There is no breakdown of the incisional site.  It is much more mobile.  There is no airway compromise.  Eyes:      Extraocular Movements: Extraocular movements intact.      Conjunctiva/sclera: Conjunctivae normal.   Neck:      Comments: Submental edema is improved  Pulmonary:      Effort: Pulmonary effort is normal. No respiratory distress.      Breath sounds: No stridor.   Musculoskeletal:         General: Normal range of motion.   Skin:     Findings: No rash.   Neurological:      General: No focal deficit present.      Mental Status: She is alert.      Cranial Nerves: No  cranial nerve deficit.   Psychiatric:         Behavior: Behavior normal.         Thought Content: Thought content normal.         Judgment: Judgment normal.           Result Review    Result Review:  I have personally reviewed the results from the time of this admission to 7/25/2021 09:16 CDT and agree with these findings:  [x]  Laboratory  []  Microbiology  []  Radiology  []  EKG/Telemetry   []  Cardiology/Vascular   []  Pathology  []  Old records  []  Other:  Most notable findings include: Blood glucoses have been elevated likely steroid effect.  Her hemoglobin A1c is elevated at 7.4.  White blood cell count was normal    Assessment/Plan   Assessment / Plan     Brief Patient Summary:  Blanca Martin is a 71 y.o. female who who is postop day 2 status post floor mouth resection with complications of severe tongue swelling.  She has been stable in the intensive care unit when the swelling is gone down with steroids.    Active Hospital Problems:  Active Hospital Problems    Diagnosis    • **Tongue edema    • Oropharyngeal dysphagia    • Airway compromise    • Diabetes mellitus (CMS/HCC)    • Stage 2 chronic kidney disease    • Hypertension      Plan:   We will have move her to the floor.  She is beginning to swallow.  If she is able to swallow liquids in her pills, will be able to discharge her possibly later this afternoon if not tomorrow.    Electronically signed by John Mckinney MD, 07/25/21, 9:19 AM CDT.

## 2021-07-25 NOTE — PLAN OF CARE
Goal Outcome Evaluation:  Plan of Care Reviewed With: patient, daughter        Progress: improving  Outcome Summary: Patient transferred to  from ICU, able to tolerated minimal PO diet, broth and jello causing pain with swallowing, edema decreased per patient, c/o pain see MAR, safety maintained.

## 2021-07-25 NOTE — PROGRESS NOTES
[facility] Medicine Services  INPATIENT PROGRESS NOTE    Length of Stay: 0  Date of Admission: 7/24/2021  Primary Care Physician: Siva Lassiter MD    Subjective   Chief Complaint: Tongue edema/diabetes/hypertension    HPI   Blood pressures much improved.  Afebrile.  Possible home today if patient is able to tolerate diet.  Throat/tongue edema is improving.    Review of Systems   Constitutional: Positive for activity change, appetite change and fatigue. Negative for chills and fever.   HENT: Positive for mouth sores. Negative for hearing loss, nosebleeds, tinnitus and trouble swallowing.         Tongue edema and throat edema.   Eyes: Negative for visual disturbance.   Respiratory: Positive for choking. Negative for cough, chest tightness, shortness of breath and wheezing.    Cardiovascular: Negative for chest pain, palpitations and leg swelling.   Gastrointestinal: Negative for abdominal distention, abdominal pain, blood in stool, constipation, diarrhea, nausea and vomiting.   Endocrine: Negative for cold intolerance, heat intolerance, polydipsia, polyphagia and polyuria.   Genitourinary: Negative for decreased urine volume, difficulty urinating, dysuria, flank pain, frequency and hematuria.   Musculoskeletal: Negative for arthralgias, joint swelling and myalgias.   Skin: Negative for rash.   Allergic/Immunologic: Negative for immunocompromised state.   Neurological: Positive for weakness. Negative for dizziness, syncope, light-headedness and headaches.   Hematological: Negative for adenopathy. Does not bruise/bleed easily.   Psychiatric/Behavioral: Negative for confusion and sleep disturbance. The patient is not nervous/anxious.           All pertinent negatives and positives are as above. All other systems have been reviewed and are negative unless otherwise stated.     Objective    Temp:  [97.1 °F (36.2 °C)-99.3 °F (37.4 °C)] 97.1 °F (36.2 °C)  Heart Rate:  []  87  Resp:  [15-25] 24  BP: (132-197)/(53-94) 168/75    Intake/Output Summary (Last 24 hours) at 7/25/2021 1006  Last data filed at 7/25/2021 0100  Gross per 24 hour   Intake 1097 ml   Output 350 ml   Net 747 ml     Physical Exam  Vitals and nursing note reviewed.   Constitutional:       Appearance: She is well-developed.   HENT:      Head: Normocephalic and atraumatic.      Mouth/Throat:      Comments: Edema . Tongue/neck much improved.  Eyes:      Conjunctiva/sclera: Conjunctivae normal.      Pupils: Pupils are equal, round, and reactive to light.   Neck:      Vascular: No JVD.   Cardiovascular:      Rate and Rhythm: Normal rate and regular rhythm.      Heart sounds: Normal heart sounds. No murmur heard.   No friction rub. No gallop.    Pulmonary:      Effort: Pulmonary effort is normal. No respiratory distress.      Breath sounds: Normal breath sounds. No wheezing or rales.   Chest:      Chest wall: No tenderness.   Abdominal:      General: Bowel sounds are normal. There is no distension.      Palpations: Abdomen is soft.      Tenderness: There is no abdominal tenderness. There is no guarding or rebound.   Musculoskeletal:         General: No tenderness or deformity. Normal range of motion.      Cervical back: Neck supple.   Skin:     General: Skin is warm and dry.      Capillary Refill: Capillary refill takes 2 to 3 seconds.      Findings: No rash.   Neurological:      Mental Status: She is alert and oriented to person, place, and time.      Cranial Nerves: No cranial nerve deficit.      Motor: Weakness present. No abnormal muscle tone.      Deep Tendon Reflexes: Reflexes normal.   Psychiatric:         Behavior: Behavior normal.         Thought Content: Thought content normal.         Judgment: Judgment normal.   Results Review:  Lab Results (last 24 hours)     Procedure Component Value Units Date/Time    Blood Culture With TRESSA - Blood, Hand, Right [[id number]] Collected: 07/24/21 5698    Specimen: Blood from  Hand, Right Updated: 07/25/21 1000     Blood Culture No growth at less than 24 hours    Blood Culture With TRESSA - Blood, Hand, Left [[id number]] Collected: 07/24/21 1715    Specimen: Blood from Hand, Left Updated: 07/25/21 1000     Blood Culture No growth at less than 24 hours    T4, Free [[id number]]  (Normal) Collected: 07/25/21 0202    Specimen: Blood Updated: 07/25/21 0807     Free T4 1.17 ng/dL     Narrative:      Results may be falsely increased if patient taking Biotin.      POC Glucose Once [[id number]]  (Abnormal) Collected: 07/25/21 0743    Specimen: Blood Updated: 07/25/21 0805     Glucose 248 mg/dL      Comment: : [id number] Saima Alesha Wolfe ID: [id number]       Basic Metabolic Panel [[id number]]  (Abnormal) Collected: 07/25/21 0202    Specimen: Blood Updated: 07/25/21 0315     Glucose 282 mg/dL      BUN 19 mg/dL      Creatinine 1.00 mg/dL      Sodium 141 mmol/L      Potassium 4.4 mmol/L      Chloride 105 mmol/L      CO2 21.0 mmol/L      Calcium 9.0 mg/dL      eGFR Non African Amer 54 mL/min/1.73      BUN/Creatinine Ratio 19.0     Anion Gap 15.0 mmol/L     Narrative:      GFR Normal >60  Chronic Kidney Disease <60  Kidney Failure <15      TSH [[id number]]  (Abnormal) Collected: 07/25/21 0202    Specimen: Blood Updated: 07/25/21 0315     TSH 0.215 uIU/mL     Lipid Panel [[id number]]  (Abnormal) Collected: 07/25/21 0202    Specimen: Blood Updated: 07/25/21 0315     Total Cholesterol 110 mg/dL      Triglycerides 75 mg/dL      HDL Cholesterol 38 mg/dL      LDL Cholesterol  57 mg/dL      VLDL Cholesterol 15 mg/dL      LDL/HDL Ratio 1.50    Narrative:      Cholesterol Reference Ranges  (U.S. Department of Health and Human Services ATP III Classifications)    Desirable          <200 mg/dL  Borderline High    200-239 mg/dL  High Risk          >240 mg/dL      Triglyceride Reference Ranges  (U.S. Department of Health and Human Services ATP III Classifications)    Normal           <150 mg/dL  Borderline High   150-199 mg/dL  High             200-499 mg/dL  Very High        >500 mg/dL    HDL Reference Ranges  (U.S. Department of Health and Human Services ATP III Classifcations)    Low     <40 mg/dl (major risk factor for CHD)  High    >60 mg/dl ('negative' risk factor for CHD)        LDL Reference Ranges  (U.S. Department of Health and Human Services ATP III Classifcations)    Optimal          <100 mg/dL  Near Optimal     100-129 mg/dL  Borderline High  130-159 mg/dL  High             160-189 mg/dL  Very High        >189 mg/dL    Hemoglobin A1c [[id number]]  (Abnormal) Collected: 07/25/21 0202    Specimen: Blood Updated: 07/25/21 0301     Hemoglobin A1C 7.40 %     Narrative:      Hemoglobin A1C Ranges:    Increased Risk for Diabetes  5.7% to 6.4%  Diabetes                     >= 6.5%  Diabetic Goal                < 7.0%    CBC & Differential [[id number]]  (Abnormal) Collected: 07/25/21 0202    Specimen: Blood Updated: 07/25/21 0247    Narrative:      The following orders were created for panel order CBC & Differential.  Procedure                               Abnormality         Status                     ---------                               -----------         ------                     CBC Auto Differential[[id number]]        Abnormal            Final result                 Please view results for these tests on the individual orders.    CBC Auto Differential [[id number]]  (Abnormal) Collected: 07/25/21 0202    Specimen: Blood Updated: 07/25/21 0247     WBC 7.61 10*3/mm3      RBC 3.86 10*6/mm3      Hemoglobin 12.5 g/dL      Hematocrit 38.6 %      .0 fL      MCH 32.4 pg      MCHC 32.4 g/dL      RDW 14.0 %      RDW-SD 51.7 fl      MPV 9.3 fL      Platelets 254 10*3/mm3      Neutrophil % 89.7 %      Lymphocyte % 9.1 %      Monocyte % 0.4 %      Eosinophil % 0.0 %      Basophil % 0.1 %      Immature Grans % 0.7 %      Neutrophils, Absolute 6.83 10*3/mm3      Lymphocytes, Absolute 0.69 10*3/mm3      Monocytes, Absolute  0.03 10*3/mm3      Eosinophils, Absolute 0.00 10*3/mm3      Basophils, Absolute 0.01 10*3/mm3      Immature Grans, Absolute 0.05 10*3/mm3      nRBC 0.0 /100 WBC     POC Glucose Once [[id number]]  (Abnormal) Collected: 07/24/21 2119    Specimen: Blood Updated: 07/24/21 2136     Glucose 228 mg/dL      Comment: : [id number] Mario De GuzmanSunny) DanaMeter ID: [id number]       POC Glucose Once [[id number]]  (Abnormal) Collected: 07/24/21 1646    Specimen: Blood Updated: 07/24/21 1658     Glucose 180 mg/dL      Comment: : [id number] Saima Eduardo  JMeter ID: [id number]       COVID PRE-OP / PRE-PROCEDURE SCREENING ORDER (NO ISOLATION) - Swab, Nasal Cavity [[id number]]  (Normal) Collected: 07/24/21 1421    Specimen: Swab from Nasal Cavity Updated: 07/24/21 1529    Narrative:      The following orders were created for panel order COVID PRE-OP / PRE-PROCEDURE SCREENING ORDER (NO ISOLATION) - Swab, Nasal Cavity.  Procedure                               Abnormality         Status                     ---------                               -----------         ------                     COVID-19,[facility]...[[id number]]  Normal              Final result                 Please view results for these tests on the individual orders.    COVID-19,[facility] IN-HOUSE,Nasal Swab No Transport Media 3-4 HR TAT - Swab, Nasal Cavity [[id number]]  (Normal) Collected: 07/24/21 1421    Specimen: Swab from Nasal Cavity Updated: 07/24/21 1529     COVID19 Not Detected    Narrative:      Fact sheet for providers: https://www.fda.gov/media/496257/download     Fact sheet for patients: https://www.fda.gov/media/721193/download    Test performed by PCR.    Consider negative results in combination with clinical observations, patient history, and epidemiological information.  Fact sheet for providers: https://www.fda.gov/media/110492/download     Fact sheet for patients: https://www.fda.gov/media/884297/download    Test performed by  PCR.    Consider negative results in combination with clinical observations, patient history, and epidemiological information.    POC Glucose Once [[id number]]  (Abnormal) Collected: 07/24/21 1511    Specimen: Blood Updated: 07/24/21 1524     Glucose 167 mg/dL      Comment: : [id number] Hansen ID: [id number]              Cultures:  Blood Culture   Date Value Ref Range Status   07/24/2021 No growth at less than 24 hours  Preliminary   07/24/2021 No growth at less than 24 hours  Preliminary       Radiology Data:    Imaging Results (Last 24 Hours)     ** No results found for the last 24 hours. **          Allergies   Allergen Reactions   • Codeine Itching   • Amoxicillin-Pot Clavulanate Nausea And Vomiting   • Ace Inhibitors Unknown (See Comments)     CAUSE PANCREAS TO BE INFLAMMED   • Advair Diskus [Fluticasone-Salmeterol] Myalgia   • Ascorbate    • Avapro [Irbesartan] Unknown (See Comments)     PANCREATITIS    • Hydrochlorothiazide Other (See Comments)     Affected pancreas   • Irbesartan-Hydrochlorothiazide Unknown (See Comments)     PANCREATITIS     • Simvastatin Myalgia   • Thiazide-Type Diuretics Unknown (See Comments)     PANCRAETITIS        Scheduled meds:   aspirin, 81 mg, Oral, Daily  atorvastatin, 20 mg, Oral, Nightly  bisoprolol, 5 mg, Oral, Q24H  dexamethasone, 8 mg, Intravenous, Q8H  DULoxetine, 60 mg, Oral, Daily  insulin lispro, 0-9 Units, Subcutaneous, TID AC  metFORMIN, 1,000 mg, Oral, BID With Meals  sodium chloride, 3 mL, Intravenous, Q12H        PRN meds:  busPIRone  •  cloNIDine  •  dextrose  •  dextrose  •  glucagon (human recombinant)  •  hydrALAZINE  •  HYDROcodone-acetaminophen  •  labetalol  •  sodium chloride    Assessment/Plan       Tongue edema    Oropharyngeal dysphagia    Airway compromise    Diabetes mellitus (CMS/HCC)    Stage 2 chronic kidney disease    Hypertension      Plan:  Tongue edema/oropharyngeal dysphagia/airway compromise.  Per ENT.  Decadron and  clindamycin.  Blood culture-no growth in 24 hours.     Hypertension/hyperlipidemia.  Cont Lipitor.  Cont zebeta. Labetalol as needed.  Hydralazine as needed.  Cont clonidine prn.     Depression/anxiety.  Cont Cymbalta.  Cont BuSpar as needed..     Diabetes.  Sliding scale.  Hemoglobin A1C 7.4.  Cont Metformin.       History of stage II renal failure/urinary retention.  Hold Hydrin because we do not have it here.     Chronic pain. Hycet.  Hold Zanaflex.    Electronically signed by Jese Pascual MD, 07/25/21, 10:06 AM CDT.

## 2021-07-26 VITALS
HEIGHT: 63 IN | DIASTOLIC BLOOD PRESSURE: 56 MMHG | SYSTOLIC BLOOD PRESSURE: 142 MMHG | BODY MASS INDEX: 31.54 KG/M2 | HEART RATE: 84 BPM | WEIGHT: 178 LBS | TEMPERATURE: 98.4 F | OXYGEN SATURATION: 96 % | RESPIRATION RATE: 16 BRPM

## 2021-07-26 LAB
GLUCOSE BLDC GLUCOMTR-MCNC: 226 MG/DL (ref 70–130)
GLUCOSE BLDC GLUCOMTR-MCNC: 227 MG/DL (ref 70–130)

## 2021-07-26 PROCEDURE — 96376 TX/PRO/DX INJ SAME DRUG ADON: CPT

## 2021-07-26 PROCEDURE — 82962 GLUCOSE BLOOD TEST: CPT

## 2021-07-26 PROCEDURE — 25010000002 DEXAMETHASONE PER 1 MG: Performed by: OTOLARYNGOLOGY

## 2021-07-26 PROCEDURE — 63710000001 INSULIN LISPRO (HUMAN) PER 5 UNITS: Performed by: OTOLARYNGOLOGY

## 2021-07-26 PROCEDURE — G0378 HOSPITAL OBSERVATION PER HR: HCPCS

## 2021-07-26 PROCEDURE — 99217 PR OBSERVATION CARE DISCHARGE MANAGEMENT: CPT | Performed by: OTOLARYNGOLOGY

## 2021-07-26 RX ADMIN — DEXAMETHASONE SODIUM PHOSPHATE 8 MG: 10 INJECTION INTRAMUSCULAR; INTRAVENOUS at 06:06

## 2021-07-26 RX ADMIN — INSULIN LISPRO 4 UNITS: 100 INJECTION, SOLUTION INTRAVENOUS; SUBCUTANEOUS at 08:28

## 2021-07-26 RX ADMIN — INSULIN LISPRO 4 UNITS: 100 INJECTION, SOLUTION INTRAVENOUS; SUBCUTANEOUS at 11:42

## 2021-07-26 RX ADMIN — BISOPROLOL FUMARATE 5 MG: 5 TABLET, FILM COATED ORAL at 08:23

## 2021-07-26 RX ADMIN — DEXAMETHASONE SODIUM PHOSPHATE 8 MG: 10 INJECTION INTRAMUSCULAR; INTRAVENOUS at 12:26

## 2021-07-26 RX ADMIN — SODIUM CHLORIDE, PRESERVATIVE FREE 3 ML: 5 INJECTION INTRAVENOUS at 08:23

## 2021-07-26 RX ADMIN — ASPIRIN 81 MG: 81 TABLET, COATED ORAL at 08:23

## 2021-07-26 RX ADMIN — DULOXETINE HYDROCHLORIDE 60 MG: 30 CAPSULE, DELAYED RELEASE ORAL at 08:23

## 2021-07-26 NOTE — CASE MANAGEMENT/SOCIAL WORK
Discharge Planning Assessment  [facility]     Patient Name: Blanca Martin  MRN: [id number]  Today's Date: 7/26/2021    Admit Date: 7/24/2021    Discharge Needs Assessment     Row Name 07/26/21 1134       Living Environment    Lives With  alone    Current Living Arrangements  home/apartment/condo    Primary Care Provided by  self    Provides Primary Care For  no one    Family Caregiver if Needed  child(hannah), adult    Able to Return to Prior Arrangements  yes       Resource/Environmental Concerns    Resource/Environmental Concerns  none       Transition Planning    Patient/Family Anticipates Transition to  home    Patient/Family Anticipated Services at Transition  none    Transportation Anticipated  family or friend will provide       Discharge Needs Assessment    Readmission Within the Last 30 Days  no previous admission in last 30 days    Equipment Currently Used at Home  none    Anticipated Changes Related to Illness  none    Equipment Needed After Discharge  none    Discharge Coordination/Progress  Pt lives at home alone and plans to go home at d/c. She states she is independent. She denies needs and does not want home health. Pt does have rx coverage.        Discharge Plan    No documentation.       Continued Care and Services - Admitted Since 7/24/2021    Coordination has not been started for this encounter.       Expected Discharge Date and Time     Expected Discharge Date Expected Discharge Time    Jul 26, 2021         Demographic Summary    No documentation.       Functional Status    No documentation.       Psychosocial    No documentation.       Abuse/Neglect    No documentation.       Legal    No documentation.       Substance Abuse    No documentation.       Patient Forms    No documentation.           GAURANG Duarte

## 2021-07-26 NOTE — DISCHARGE SUMMARY
[facility]  ENT PROGRESS NOTES  2021      Patient Identification:  Name: Blanca Martin  Age: 71 y.o.  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 intake without difficulty.  She feels much better and wants to go home.  Pathology is pending  HISTORY   HPI, ROS, PMFSHx reviewed:   No changes       Objective     PE:    Temp:  [98.4 °F (36.9 °C)-98.9 °F (37.2 °C)] 98.4 °F (36.9 °C)  Heart Rate:  [78-84] 84  Resp:  [16-18] 16  BP: (133-156)/(56-62) 142/56   Body mass index is 31.53 kg/m².     General appearance: alert, well appearing, and in no distress, oriented to person, place, and time and acyanotic, in no respiratory distress.   Ability to Communicate: Patient is able to communicate well   Facial exam: facial movement was normal and symmetrical, nontender   Ears - right ear normal, left ear normal.   Nasal exam - normal and patent, no erythema, discharge or polyps.   Oropharyngeal exam - Mucous membranes moist with left floor mouth mucosal incision intact.   Neck exam - supple, no significant adenopathy.     CVS exam: normal rate and regular rhythm.   Chest: no tachypnea, retractions or cyanosis.   No lymphadenopathy in the anterior or posterior neck, supraclavicular areas.   Neurological exam reveals alert, oriented, normal speech, no focal findings or movement disorder noted, neck supple without rigidity.    DATA      MEDICATIONS   I have reviewed the current MAR.     LABS AND IMAGING      I have reviewed the labs and imaging data since the patient was last seen.       Assessment     ASSESSMENT       Tongue edema    Oropharyngeal dysphagia    Airway compromise    Diabetes mellitus (CMS/HCC)    Stage 2 chronic kidney disease     Hypertension      Improved      Plan     PLAN   Discharge home  Patient already has postoperative pain medication and follow-up arranged  Call or return for problems        Peter Navarro MD  7/26/2021  11:19 CDT

## 2021-07-26 NOTE — PROGRESS NOTES
[facility] Medicine Services  INPATIENT PROGRESS NOTE    Patient Name: Blanca Martin  Date of Admission: 7/24/2021  Today's Date: 07/26/21  Length of Stay: 0  Primary Care Physician: Siva Lassiter MD    Subjective   Chief Complaint: tongue edema  HPI   Doing well, tongue swelling much better  Sugars still elevated most likely secondary to steroids  Tolerated clear liquids, no difficulty swallowing  No stridor or SOA        Review of Systems   Constitutional: Negative for fatigue and fever.   HENT: Negative for congestion and ear pain.    Eyes: Negative for pain and visual disturbance.   Respiratory: Negative for cough, shortness of breath and wheezing.    Cardiovascular: Negative for chest pain and palpitations.   Gastrointestinal: Negative for diarrhea, nausea and vomiting.   Endocrine: Negative for heat intolerance.   Genitourinary: Negative for dysuria and frequency.   Musculoskeletal: Negative for arthralgias and back pain.   Skin: Negative for rash and wound.   Neurological: Negative for dizziness and light-headedness.   Psychiatric/Behavioral: Negative for confusion. The patient is not nervous/anxious.    All other systems reviewed and are negative.       All pertinent negatives and positives are as above. All other systems have been reviewed and are negative unless otherwise stated.     Objective    Temp:  [98.4 °F (36.9 °C)-98.9 °F (37.2 °C)] 98.4 °F (36.9 °C)  Heart Rate:  [78-84] 84  Resp:  [16-18] 16  BP: (133-156)/(56-62) 142/56  Physical Exam  Constitutional:       Appearance: Normal appearance. She is well-developed.   HENT:      Head: Normocephalic and atraumatic.      Right Ear: Tympanic membrane, ear canal and external ear normal.      Left Ear: Tympanic membrane, ear canal and external ear normal.      Nose: Nose normal.      Mouth/Throat:      Mouth: Mucous membranes are moist.      Pharynx: Oropharynx is clear.   Eyes:      Extraocular Movements:  Extraocular movements intact.      Conjunctiva/sclera: Conjunctivae normal.      Pupils: Pupils are equal, round, and reactive to light.   Cardiovascular:      Rate and Rhythm: Normal rate and regular rhythm.      Heart sounds: Normal heart sounds.   Pulmonary:      Effort: Pulmonary effort is normal.      Breath sounds: Normal breath sounds.   Abdominal:      General: Bowel sounds are normal. There is no distension.      Palpations: Abdomen is soft.      Tenderness: There is no abdominal tenderness.   Musculoskeletal:         General: Normal range of motion.      Cervical back: Normal range of motion and neck supple.   Skin:     General: Skin is warm and dry.   Neurological:      Mental Status: She is alert and oriented to person, place, and time.   Psychiatric:         Mood and Affect: Mood normal.         Speech: Speech normal.         Behavior: Behavior normal.         Thought Content: Thought content normal.         Judgment: Judgment normal.             Results Review:  I have reviewed the labs, radiology results, and diagnostic studies.    Laboratory Data:   Results from last 7 days   Lab Units 07/25/21  0202 07/24/21  1211 07/20/21  1405   WBC 10*3/mm3 7.61 10.70 7.79   HEMOGLOBIN g/dL 12.5 13.2 11.7*   HEMATOCRIT % 38.6 41.3 36.0   PLATELETS 10*3/mm3 254 252 238        Results from last 7 days   Lab Units 07/25/21  0202 07/24/21  1211 07/20/21  1405   SODIUM mmol/L 141 140 139   POTASSIUM mmol/L 4.4 4.4 4.4   CHLORIDE mmol/L 105 105 104   CO2 mmol/L 21.0* 23.0 25.0   BUN mg/dL 19 14 21   CREATININE mg/dL 1.00 0.97 1.13*   CALCIUM mg/dL 9.0 9.2 8.9   BILIRUBIN mg/dL  --  0.4 <0.2   ALK PHOS U/L  --  88 82   ALT (SGPT) U/L  --  11 16   AST (SGOT) U/L  --  9 13   GLUCOSE mg/dL 282* 184* 125*       Culture Data:   Blood Culture   Date Value Ref Range Status   07/24/2021 No growth at 24 hours  Preliminary   07/24/2021 No growth at 24 hours  Preliminary       Radiology Data:   Imaging Results (Last 24 Hours)      ** No results found for the last 24 hours. **          I have reviewed the patient's current medications.     Labs reviewed:Hyperglycemia     Imaging reviewed: CT soft tissue neck    Telemetry reviewed    Telemetry independently interpreted by me: NSR      Assessment/Plan     Active Hospital Problems    Diagnosis    • **Tongue edema    • Oropharyngeal dysphagia    • Airway compromise    • Diabetes mellitus (CMS/HCC)    • Stage 2 chronic kidney disease    • Hypertension        1.  Tongue edema  -Steroids per ENT    2.  T2DM  -SSI    3.  CKD  -monitor    4.  HTN   -Bisoprolol    Will need close follow up with PCP to make sure glucose improving off steroids.  No medication adjustments at this time.            Discharge Planning: I expect the patient to be discharged to home today    Electronically signed by Mat Ryan MD, 07/26/21, 11:31 CDT.

## 2021-07-26 NOTE — PLAN OF CARE
Goal Outcome Evaluation:  Plan of Care Reviewed With: patient        Progress: no change  Outcome Summary: Patient c/o of pain x1. See mar. Patient swallowing liquids and pills this shift. Voiding. Patient up ad gamaliel. Safety maintained.

## 2021-07-26 NOTE — PLAN OF CARE
VSS, up ad gamaliel, voiding, safety maintained, goals met, no SOA or difficulty breathing, tolerating a clear liquid diet, verbalizes understanding of all discharge instructions    Problem: Adult Inpatient Plan of Care  Goal: Plan of Care Review  Outcome: Met  Flowsheets  Taken 7/26/2021 1259 by Melony Mendoza, RN  Progress: improving  Taken 7/26/2021 0248 by Faviola Nair RN  Plan of Care Reviewed With: patient   Goal Outcome Evaluation:           Progress: improving

## 2021-07-27 LAB
CYTO UR: NORMAL
LAB AP CASE REPORT: NORMAL
PATH REPORT.FINAL DX SPEC: NORMAL
PATH REPORT.GROSS SPEC: NORMAL

## 2021-07-29 LAB
BACTERIA SPEC AEROBE CULT: NORMAL
BACTERIA SPEC AEROBE CULT: NORMAL

## 2021-07-30 ENCOUNTER — TELEPHONE (OUTPATIENT)
Dept: OTOLARYNGOLOGY | Facility: CLINIC | Age: 71
End: 2021-07-30

## 2021-07-30 NOTE — TELEPHONE ENCOUNTER
----- Message from Peter Navarro MD sent at 7/29/2021  4:28 PM CDT -----  Please call patient with result: Keep follow-up

## 2021-08-02 NOTE — PROGRESS NOTES
"YOB: 1950  Location: [facility]  Location Address: [address] [place] [address], [facility], [address] [place] [address]  Location Phone: [phone]    Chief Complaint   Patient presents with   • Follow-up       History of Present Illness  Blanca Martin is a 71 y.o. female.  Blanca Martin is status post Laser excision anterior for mouth leukoplakia on 21. Patient has numbness of her tongue. The area of laser excision is healing but still has area of scabbing. Patient denies bleeding of area of surgical site.      1 Result Note     1 Follow-up Encounter  Component    Case Report   Surgical Pathology Report                         Case: [id number]                                   Authorizing Provider:  Peter Navarro MD    Collected:           2021 07:45 AM           Ordering Location:     [facility]  Received:            2021 09:56 AM           Pathologist:           Renay Gramajo MD                                                         Specimen:    Tongue, left anterior floor of mouth                                                       Final Diagnosis   Left anterior floor of mouth lesion, excisional biopsy:  A.  Marked hyperkeratosis with acanthosis, papillomatosis and changes of mild dysplasia.  B.  Marked chronic inflammation with melanophages.  C.  No definite dysplasia identified at inked surgical margins.  D.  No histologic evidence of malignancy.   Electronically signed by Renay Gramajo MD on 2021 at 1637   Gross Description       Specimen #1 is received in a formalin filled container labeled with the patient's name, date of birth, and \"left anterior floor of mouth\".  The specimen consists of an epithelial covered ellipse measuring 1.3 x 0.6 cm and measuring 0.3 cm in depth.  The epithelial surface is marked by ill-defined gray-white patches encompassing 80% of the surface.  The specimen is not oriented and the resection margin is inked " black.  The cut surface is pink-tan, soft and otherwise unremarkable.  The specimen is totally submitted in block 1A.   Microscopic Description    Microscopic examination was performed.                Past Medical History:   Diagnosis Date   • Arthritis    • Chronic back pain    • Chronic kidney disease     stage 2   • COVID-19 vaccine series started    • Depression    • Diabetes mellitus (CMS/HCC)    • Elevated cholesterol    • H/O blood clots    • Hyperlipidemia    • Hypertension    • Leukoplakia of oral cavity    • PONV (postoperative nausea and vomiting)    • Spinal headache    • Vascular disease        Past Surgical History:   Procedure Laterality Date   • ANTERIOR CERVICAL DISCECTOMY W/ FUSION N/A 2018    Procedure: ANTERIOR CERVICAL DISCECTOMY FUSION C4-5, C6-7 WITH INSTRUMENTATION C4-7;  Surgeon: JAXSON Rm MD;  Location:  [facility] OR;  Service: Orthopedic Spine   • APPENDECTOMY     • BACK SURGERY     • CARDIAC CATHETERIZATION     • CARPAL TUNNEL RELEASE Bilateral    • CERVICAL FUSION      C5-6   •  SECTION     • CHOLECYSTECTOMY     • GANGLION CYST EXCISION Right    • HERNIA REPAIR      VENTRAL    • HYSTERECTOMY     • JOINT REPLACEMENT Bilateral 2012 KNEES   • KNEE SURGERY Left     Cleaned joint out   • LAMINECTOMY THORACIC SPINE W/ PLACEMENT SPINAL CORD STIMULATOR     • LARYNGOSCOPY N/A 2021    Procedure: MICRODIRECT LARYNGOSCOPY WITH LASER EXCISION OF LESION OF THE ANTERIOR FLOOR OF MOUTH;  Surgeon: Peter Navarro MD;  Location:  [facility] OR;  Service: ENT;  Laterality: N/A;   • LEG SURGERY      blood clot removed   • LUMBAR FUSION  2010    L2-5/L5S1   • NASAL SEPTUM SURGERY     • NECK SURGERY     • ORAL LESION EXCISION/BIOPSY N/A 2021    Procedure: EXCISION OF LESION OF THE ANTERIOR FLOOR OF MOUTH;  Surgeon: Peter Navarro MD;  Location:  [facility] OR;  Service: ENT;  Laterality: N/A;   • RADIOFREQUENCY ABLATION Right 2014   • SHOULDER SURGERY  Left     Fatty tumor removed   • SPINAL CORD STIMULATOR IMPLANT  2021   • TONSILLECTOMY     • TRIGGER FINGER RELEASE Left 2009   • VEIN LIGATION AND STRIPPING Left        Outpatient Medications Marked as Taking for the 8/3/21 encounter (Office Visit) with Peter Navarro MD   Medication Sig Dispense Refill   • aspirin 81 MG EC tablet Take 81 mg by mouth Daily. Dr. Lassiter     • atorvastatin (LIPITOR) 20 MG tablet Take 20 mg by mouth Every Night.     • bisoprolol (ZEBeta) 5 MG tablet Take 5 mg by mouth Daily.     • busPIRone (BUSPAR) 15 MG tablet Take 15 mg by mouth Daily As Needed (AS NEEDED FOR ANXIETY).     • CloNIDine (CATAPRES) 0.1 MG tablet Take 0.1 mg by mouth Daily As Needed for High Blood Pressure (SYSOVER 160 COREEN. OVER 120).     • diclofenac (VOLTAREN) 75 MG EC tablet Take 75 mg by mouth 2 (Two) Times a Day.     • doxazosin (CARDURA) 2 MG tablet Take 2 mg by mouth Daily.     • DULoxetine (CYMBALTA) 60 MG capsule Take 60 mg by mouth Daily.     • fluticasone (FLONASE) 50 MCG/ACT nasal spray 2 sprays into the nostril(s) as directed by provider Daily. 16 g 11   • glimepiride (AMARYL) 2 MG tablet Take 2 mg by mouth Daily As Needed (IF BS OVER 150).     • latanoprost (XALATAN) 0.005 % ophthalmic solution INSTILL 1 DROP INTO BOTH EYES EVERY DAY AT NIGHT     • metFORMIN (GLUCOPHAGE) 1000 MG tablet Take 1,000 mg by mouth 2 (Two) Times a Day.     • naloxone (Narcan) 4 MG/0.1ML nasal spray Narcan 4 mg/actuation nasal spray     • oxyCODONE-acetaminophen (PERCOCET) 5-325 MG per tablet      • tiZANidine (ZANAFLEX) 4 MG tablet Take 4 mg by mouth Every 8 (Eight) Hours As Needed for Muscle Spasms.     • [DISCONTINUED] chlorhexidine (Peridex) 0.12 % solution Apply 15 mL to the mouth or throat 2 (Two) Times a Day. 240 mL 0   • [DISCONTINUED] Latanoprost (Xelpros) 0.005 % emulsion Apply 1 drop to eye(s) as directed by provider Every Night.         Codeine, Amoxicillin-pot clavulanate, Ace inhibitors, Advair diskus  [fluticasone-salmeterol], Ascorbate, Avapro [irbesartan], Hydrochlorothiazide, Irbesartan-hydrochlorothiazide, Simvastatin, and Thiazide-type diuretics    Family History   Problem Relation Age of Onset   • Cancer Father    • Lung cancer Mother    • Heart disease Mother        Social History     Socioeconomic History   • Marital status:      Spouse name: Not on file   • Number of children: Not on file   • Years of education: Not on file   • Highest education level: Not on file   Tobacco Use   • Smoking status: Current Every Day Smoker     Packs/day: 1.00     Years: 46.00     Pack years: 46.00     Types: Cigarettes     Start date: 1980   • Smokeless tobacco: Never Used   Vaping Use   • Vaping Use: Never used   Substance and Sexual Activity   • Alcohol use: No   • Drug use: Not Currently   • Sexual activity: Defer       Review of Systems   HENT:        Numbness of tongue       Vitals:    08/03/21 1307   BP: 134/71   Pulse: 66   Temp: 97.3 °F (36.3 °C)       Body mass index is 31.96 kg/m².    Objective     Physical Exam  CONSTITUTIONAL: well nourished, well-developed, alert, oriented, in no acute distress     COMMUNICATION AND VOICE: able to communicate normally, normal voice quality    HEAD: normocephalic, no lesions, atraumatic, no tenderness, no masses     FACE: appearance normal, no lesions, no tenderness, no deformities, facial motion symmetric    EYES: ocular motility normal, eyelids normal, orbits normal, no proptosis, conjunctiva normal , pupils equal, round     EARS:  Hearing: hearing to conversational voice intact bilaterally   External Ears: normal bilaterally, no lesions    NOSE:  External Nose: external nasal structure normal, no tenderness on palpation, no nasal discharge, no lesions, no evidence of trauma, nostrils patent     ORAL:  Lips: upper and lower lips without lesion   OC/OP-doing well with no edema in the floor mouth or anterior tongue.  Minimal eschar residual    NECK:  Inspection and  Palpation: neck appearance normal, no masses or tenderness    CHEST/RESPIRATORY: normal respiratory effort     CARDIOVASCULAR: no cyanosis or edema     NEUROLOGICAL/PSYCHIATRIC: oriented to time, place and person, mood normal, affect appropriate, CN II-XII intact grossly    Assessment/Plan   Diagnoses and all orders for this visit:    1. Oral leukoplakia ventral tongue (Primary)  Comments:  Biopsy reflective of mild dysplasia with no evidence of malignancy    2. Tobacco use disorder    3. Stage 2 chronic kidney disease      * Surgery not found *  No orders of the defined types were placed in this encounter.    Return in about 6 months (around 2/3/2022) for Recheck.       Patient Instructions   I advised the patient of the risks in continuing to use tobacco and recommended complete cessation, The inherent risks including the risk of disability, development of a malignancy and/or death was discussed.  The patient indicated understanding.    Continue local wound care  Call return for problems  Follow-up in 6 months    Reassurance given regarding resolution of numbness

## 2021-08-03 ENCOUNTER — OFFICE VISIT (OUTPATIENT)
Dept: OTOLARYNGOLOGY | Facility: CLINIC | Age: 71
End: 2021-08-03

## 2021-08-03 VITALS
DIASTOLIC BLOOD PRESSURE: 71 MMHG | HEART RATE: 66 BPM | WEIGHT: 180.4 LBS | SYSTOLIC BLOOD PRESSURE: 134 MMHG | HEIGHT: 63 IN | BODY MASS INDEX: 31.96 KG/M2 | TEMPERATURE: 97.3 F

## 2021-08-03 DIAGNOSIS — F17.200 TOBACCO USE DISORDER: ICD-10-CM

## 2021-08-03 DIAGNOSIS — N18.2 STAGE 2 CHRONIC KIDNEY DISEASE: ICD-10-CM

## 2021-08-03 DIAGNOSIS — K13.21 ORAL LEUKOPLAKIA: Primary | ICD-10-CM

## 2021-08-03 PROCEDURE — 99024 POSTOP FOLLOW-UP VISIT: CPT | Performed by: OTOLARYNGOLOGY

## 2021-08-03 RX ORDER — LATANOPROST 50 UG/ML
SOLUTION/ DROPS OPHTHALMIC
COMMUNITY
Start: 2021-05-28

## 2021-08-03 RX ORDER — NALOXONE HYDROCHLORIDE 4 MG/.1ML
SPRAY NASAL
COMMUNITY

## 2021-08-03 NOTE — PATIENT INSTRUCTIONS
I advised the patient of the risks in continuing to use tobacco and recommended complete cessation, The inherent risks including the risk of disability, development of a malignancy and/or death was discussed.  The patient indicated understanding.    Continue local wound care  Call return for problems  Follow-up in 6 months    Reassurance given regarding resolution of numbness

## 2021-12-08 LAB
CHOLESTEROL, TOTAL: 117 MG/DL (ref 160–199)
HBA1C MFR BLD: 8.6 % (ref 4–6)
HDLC SERPL-MCNC: 34 MG/DL (ref 65–121)
LDL CHOLESTEROL CALCULATED: 55 MG/DL
TRIGL SERPL-MCNC: 142 MG/DL (ref 0–149)
VITAMIN D 25-HYDROXY: 30.3 NG/ML

## 2022-01-19 ENCOUNTER — HOSPITAL ENCOUNTER (OUTPATIENT)
Dept: NON INVASIVE DIAGNOSTICS | Age: 72
Discharge: HOME | End: 2022-01-19
Payer: MEDICARE

## 2022-01-19 DIAGNOSIS — R09.89 OTHER SPECIFIED SYMPTOMS AND SIGNS INVOLVING THE CIRCULATORY AND RESPIRATORY SYSTEMS: ICD-10-CM

## 2022-01-19 DIAGNOSIS — R09.89 OTH SYMPTOMS AND SIGNS INVOLVING THE CIRC AND RESP SYSTEMS: ICD-10-CM

## 2022-01-19 PROCEDURE — 93925 LOWER EXTREMITY STUDY: CPT

## 2022-01-19 PROCEDURE — 93923 UPR/LXTR ART STDY 3+ LVLS: CPT

## 2022-01-19 PROCEDURE — 93922 UPR/L XTREMITY ART 2 LEVELS: CPT

## 2022-02-03 ENCOUNTER — VIRTUAL VISIT (OUTPATIENT)
Dept: VASCULAR SURGERY | Age: 72
End: 2022-02-03
Payer: MEDICARE

## 2022-02-03 DIAGNOSIS — M79.89 LEG SWELLING: ICD-10-CM

## 2022-02-03 DIAGNOSIS — I70.213 ATHEROSCLER OF NATIVE ARTERY OF BOTH LEGS WITH INTERMIT CLAUDICATION (HCC): Primary | ICD-10-CM

## 2022-02-03 DIAGNOSIS — I83.813 VARICOSE VEINS OF BOTH LOWER EXTREMITIES WITH PAIN: ICD-10-CM

## 2022-02-03 DIAGNOSIS — I87.2 VENOUS INSUFFICIENCY: ICD-10-CM

## 2022-02-03 PROBLEM — M19.90 OSTEOARTHRITIS: Status: ACTIVE | Noted: 2022-02-03

## 2022-02-03 PROCEDURE — G8400 PT W/DXA NO RESULTS DOC: HCPCS | Performed by: NURSE PRACTITIONER

## 2022-02-03 PROCEDURE — 4040F PNEUMOC VAC/ADMIN/RCVD: CPT | Performed by: NURSE PRACTITIONER

## 2022-02-03 PROCEDURE — 4004F PT TOBACCO SCREEN RCVD TLK: CPT | Performed by: NURSE PRACTITIONER

## 2022-02-03 PROCEDURE — G8427 DOCREV CUR MEDS BY ELIG CLIN: HCPCS | Performed by: NURSE PRACTITIONER

## 2022-02-03 PROCEDURE — G8417 CALC BMI ABV UP PARAM F/U: HCPCS | Performed by: NURSE PRACTITIONER

## 2022-02-03 PROCEDURE — 3017F COLORECTAL CA SCREEN DOC REV: CPT | Performed by: NURSE PRACTITIONER

## 2022-02-03 PROCEDURE — 1090F PRES/ABSN URINE INCON ASSESS: CPT | Performed by: NURSE PRACTITIONER

## 2022-02-03 PROCEDURE — 1123F ACP DISCUSS/DSCN MKR DOCD: CPT | Performed by: NURSE PRACTITIONER

## 2022-02-03 PROCEDURE — G8484 FLU IMMUNIZE NO ADMIN: HCPCS | Performed by: NURSE PRACTITIONER

## 2022-02-03 PROCEDURE — 99203 OFFICE O/P NEW LOW 30 MIN: CPT | Performed by: NURSE PRACTITIONER

## 2022-02-03 RX ORDER — OXYCODONE HYDROCHLORIDE 5 MG/1
5 CAPSULE ORAL EVERY 12 HOURS
COMMUNITY

## 2022-02-03 NOTE — PROGRESS NOTES
Rosalinda Spence (:  1950) is a 66 y.o. female,New patient, here for evaluation of the following chief complaint(s): Establish Care          SUBJECTIVE/OBJECTIVE:  Monisha Gorman has a history of peripheral vascular disease of the lower extremities. She has had this for < 1 year. Current treatment includes none. Monisha Gorman has not had new wounds. Recently, she reports claudication at a distance of  2-3 minutes. Monisha Gorman reports that the left leg is more significant than the right. She reports claudication is worsened and is mostly in the form of crampy type pain starting in the hips and calves. She has a short recovery time. This is reproducible in nature. She reports ischemic rest pain 0 times per night. She reports walking with cart does not help. She has a known history of of varicose veins and chronic venous insufficiency. She reports prominent veins on the  Left leg(s), lower extremity edema on the  Left leg(s), the veins are painful -- severity:  moderate and pain is aggravated by upright posture. She reports previous treatment includes none. She does not have a history of spontaneous rupture. She stopped wearing hose because she cannot get them on due to her back. She has had previous right GSV ablation    Differential diagnosis for leg pain includes but is not limited to: varicose veins, peripheral vascular disease, arthritic pain, DVT, lumbar disc disease, and neurogenic pain.           Rosalinda Spence is a 66 y.o. female with the following history as [facility] in [facility]:  Patient Active Problem List    Diagnosis Date Noted    Osteoarthritis 2022    Failed back syndrome of lumbar spine 2021    Lumbar radiculopathy 2021    Duodenitis 2020    Acute pancreatitis 2018    Pancreatitis, recurrent 2018    Productive cough 10/26/2016    History of adenomatous polyp of colon 10/26/2016    Irritable bowel syndrome with constipation 2014    Narcotic dependence ([facility] [address] 11/04/2014    Abdominal cramping 11/04/2014    Leg pain, bilateral 08/28/2014    Venous insufficiency of both lower extremities 08/28/2014    Varicose vein 06/24/2014    Chronic back pain 02/04/2013    HTN (hypertension) 02/04/2013    History of colon polyps 12/03/2012    Bloating 12/03/2012    Hemorrhoids 12/03/2012    Gastroesophageal reflux disease 06/20/2011    Constipation, chronic 06/20/2011    Colon polyps 06/20/2011     Current Outpatient Medications   Medication Sig Dispense Refill    oxyCODONE 5 MG capsule Take 5 mg by mouth every 12 hours.  diclofenac (VOLTAREN) 75 MG EC tablet Take 1 tablet by mouth 2 times daily 1 tablet 0    fluticasone (FLONASE) 50 MCG/ACT nasal spray 2 sprays by Each Nostril route daily      doxazosin (CARDURA) 1 MG tablet Take 2 mg by mouth nightly      Cholecalciferol (VITAMIN D3) 1.25 MG (47834 UT) CAPS Take 1 capsule by mouth once a week      tiZANidine (ZANAFLEX) 4 MG tablet Take 4 mg by mouth every 8 hours as needed      busPIRone (BUSPAR) 15 MG tablet Take 15 mg by mouth daily as needed      cloNIDine (CATAPRES) 0.1 MG tablet Take 0.1 mg by mouth daily as needed for High Blood Pressure      glimepiride (AMARYL) 2 MG tablet Take 2 mg by mouth daily as needed Indications: prn high blood sugar       metFORMIN (GLUCOPHAGE) 500 MG tablet Take 1,000 mg by mouth 2 times daily       bisoprolol (ZEBETA) 5 MG tablet Take 10 mg by mouth daily       atorvastatin (LIPITOR) 20 MG tablet Take 20 mg by mouth daily.  DULoxetine HCl (CYMBALTA PO) Take 60 mg by mouth daily.  Latanoprost (XELPROS) 0.005 % EMUL Apply 1 drop to eye nightly each eye (Patient not taking: Reported on 2/3/2022)       No current facility-administered medications for this visit.      Allergies: Codeine, Amoxicillin-pot clavulanate, Best-c [ascorbate], Hctz, Hydrochlorothiazide, Irbesartan, Irbesartan-hydrochlorothiazide, Simvastatin, and Thiazide-type diuretics  Past Medical History:   Diagnosis Date    Carpal tunnel syndrome     Chronic back pain     Diabetes ([address]     DVT (deep venous thrombosis) (HCC)     History of blood transfusion     Hx of blood clots     DVT-POST OP  SECTION, DVT X 2 SINCE THEN    Hyperlipidemia     Hypertension     Osteoarthritis     Pancreatitis     Tendonitis     Unspecified sleep apnea     Pt does not sleep with C-pap machine    Varicose vein 2014     Past Surgical History:   Procedure Laterality Date    APPENDECTOMY      BACK SURGERY      x2    BACK SURGERY      stimulator    CARPAL TUNNEL RELEASE      right hand     SECTION      X 3    CHOLECYSTECTOMY      COLONOSCOPY  2008    COLONOSCOPY  10/25/2011    Dr Blackmon Falling: tubular adenoma    CYST REMOVAL      GANGLION CYST REMOVAL    ERCP  2009    HYSTERECTOMY      TOTAL    JOINT REPLACEMENT Bilateral     Knee    KNEE ARTHROSCOPY      x2    LAMINECTOMY N/A 2021    THORACIC LAMINECTOMY FOR SPINAL CORD STIM WITH NEUROMONITORING performed by Virgen Razo MD at [facility] pulled   [address]      UPPER GASTROINTESTINAL ENDOSCOPY  2007    UPPER GASTROINTESTINAL ENDOSCOPY  2011    Dr Blackmon Falling: gastritis, small hiatal hernia    VARICOSE VEIN SURGERY Right 2014    GSV RF Ablation    VEIN SURGERY  1967    ?  left leg vein stripping     Family History   Problem Relation Age of Onset    Heart Disease Mother     Kidney Disease Mother     Cancer Father         liver cancer    Liver Cancer Father     High Blood Pressure Sister     High Blood Pressure Brother     High Cholesterol Brother     Heart Disease Brother     Colon Cancer Neg Hx     Colon Polyps Neg Hx     Esophageal Cancer Neg Hx     Liver Disease Neg Hx     Rectal Cancer Neg Hx     Stomach Cancer Neg Hx      Social History     Tobacco Use    Smoking status: Current Every Day Smoker Packs/day: 1.00     Years: 40.00     Pack years: 40.00     Types: Cigarettes    Smokeless tobacco: Never Used   Substance Use Topics    Alcohol use: No       ROS  Eyes  no sudden vision change or amaurosis. Respiratory  no significant shortness of breath,  Cardiovascular  no chest pain or syncope. No  significant leg swelling. No claudication. Musculoskeletal  no gait disturbance  Skin  no new wound. Neurologic   No speech difficulty or lateralizing weakness. All other review of systems are negative. No flowsheet data found. Physical Exam    Due to this being a TeleHealth encounter, evaluation of the following organ systems is limited: Vitals/Constitutional/EENT/Resp/CV/GI//MS/Neuro/Skin/Heme-Lymph-Imm. Constitutional  well developed, well nourished. No diaphoresis or acute distress. Neck- ROM appears normal  Extremities -No cyanosis, clubbing, has edema. No signs atheroembolic event. Innumerable varicose veins of both legs and feet. No wounds   Pulmonary  effort appears normal.  No respiratory distress. No accessory muscle use  Neurologic  alert and oriented X 3. Cranial Nerves II-XII grossly intact  Skin  intact. No rash, erythema, or pallor. Psychiatric  mood, affect, and behavior appear normal.  Judgment and thought processes appear normal.    Risk factors for atherosclerosis of all vascular beds have been reviewed with the patient including:  Family history, tobacco abuse in all forms, elevated cholesterol, hyperlipidemia, and diabetes. Lower extremity arterial study: Right LAZ 1.13, Left LAZ 0.64. Individual films reviewed: Yes. These results were reviewed with the patient. Disease process is acute uncomplicated      Reviewed on this visit: pcp notes    Reviewed previous studies including: shameka  Individual images were reviewed. I agree with the findings  Results were discussed with the patient.     Options have been discussed with the patient including continued medical management vs. proceeding with angiogram with runoff and possible angioplasty/atherectomy/stent . Patient has opted to proceed with cmm for now. Risks have been discussed with the patient including but not limited to MI, death, CVA, bleed, nerve injury, and infection. ASSESSMENT/PLAN:  1. Atheroscler of native artery of both legs with intermit claudication ([address]  2. Venous insufficiency  3. Varicose veins of both lower extremities with pain  4. Leg swelling      Discussed management of shameka which includes:  start asa to reduce risk of arterial thrombosis and to decrease rate of plaque buildup  Strongly encourage start/continue statin therapy -   Recommend no smoking - discussed the effect tobacco has on illness;   Proceed with 6 months with shameka  She will call sooner if she decides to proceed       Patient instructed to walk as much as possible. Call our office with any progressive pain in leg(s) or hip(s) with walking. Take good care of your feet. Let us know right away if you develop a wound on your foot. Claudene Barrows, was evaluated through a synchronous (real-time) audio-video  encounter. The patient (or guardian if applicable) is aware that this is a billable  service, which includes applicable co-pays. This Virtual Visit was conducted with  patient's (and/or legal guardian's) consent. The visit was conducted pursuant to  the emergency declaration under the [address] [place] [address], [address] [place] [address] wa[facility] Healthcare authority and the [employer] and  [employer] General Act. Patient identification was verified,  and a caregiver was present when appropriate. The patient was located in a  state where the provider was licensed to provide care. Patient identification was verified at the start of the visit: Yes      Services were provided through a video synchronous discussion virtually to substitute for in-person clinic visit.  Patient and provider were located at their individual homes. An electronic signature was used to authenticate this note. --GALI Castillo     Addendum - she has called and decided to proceed.   angiogram with runoff and possible angioplasty/atherectomy/stent

## 2022-02-08 ENCOUNTER — TELEPHONE (OUTPATIENT)
Dept: VASCULAR SURGERY | Age: 72
End: 2022-02-08

## 2022-02-08 NOTE — TELEPHONE ENCOUNTER
Patient called in and stated she wuold like to proceed with surgery. She is requesting Dr Javier Barraza to perform surgery and prefers a Wednesday.   She states she is not able to do 2/16/22

## 2022-02-11 NOTE — TELEPHONE ENCOUNTER
I tried to contact the pt to discuss the details of her upcoming procedure with Dr. Kathya Barron. I was unable to reach her but I did leave a vm asking her to return my call on Mon.

## 2022-02-16 ENCOUNTER — OFFICE VISIT (OUTPATIENT)
Dept: OTOLARYNGOLOGY | Facility: CLINIC | Age: 72
End: 2022-02-16

## 2022-02-16 VITALS
HEIGHT: 63 IN | SYSTOLIC BLOOD PRESSURE: 152 MMHG | HEART RATE: 69 BPM | BODY MASS INDEX: 32.04 KG/M2 | WEIGHT: 180.8 LBS | TEMPERATURE: 96.8 F | DIASTOLIC BLOOD PRESSURE: 73 MMHG

## 2022-02-16 DIAGNOSIS — F17.200 TOBACCO USE DISORDER: ICD-10-CM

## 2022-02-16 DIAGNOSIS — N18.2 STAGE 2 CHRONIC KIDNEY DISEASE: ICD-10-CM

## 2022-02-16 DIAGNOSIS — K13.21 ORAL LEUKOPLAKIA: Primary | ICD-10-CM

## 2022-02-16 DIAGNOSIS — J30.1 NON-SEASONAL ALLERGIC RHINITIS DUE TO POLLEN: ICD-10-CM

## 2022-02-16 PROCEDURE — 99213 OFFICE O/P EST LOW 20 MIN: CPT | Performed by: NURSE PRACTITIONER

## 2022-02-16 RX ORDER — CALCIUM CITRATE/VITAMIN D3 200MG-6.25
TABLET ORAL
COMMUNITY
Start: 2021-12-16

## 2022-02-16 RX ORDER — OXYCODONE HYDROCHLORIDE 5 MG/1
5 CAPSULE ORAL
COMMUNITY

## 2022-02-16 RX ORDER — ERGOCALCIFEROL 1.25 MG/1
CAPSULE ORAL
COMMUNITY

## 2022-02-16 RX ORDER — BISOPROLOL FUMARATE 10 MG/1
10 TABLET, FILM COATED ORAL DAILY
COMMUNITY
Start: 2021-12-25

## 2022-02-16 NOTE — PATIENT INSTRUCTIONS
CONTACT INFORMATION:  The main office phone number is [phone]. For emergencies after hours and on weekends, this number will convert over to our answering service and the on call provider will answer. Please try to keep non emergent phone calls/ questions to office hours 9am-5pm Monday through Friday.      [employer]  As an alternative, you can sign up and use the Epic MyChart system for more direct and quicker access for non emergent questions/ problems.  [employer] allows you to send messages to your doctor, view your test results, renew your prescriptions, schedule appointments, and more. To sign up, go to [employer] and click on the Sign Up Now link in the New User? box. Enter your [employer] Activation Code exactly as it appears below along with the last four digits of your Social Security Number and your Date of Birth () to complete the sign-up process. If you do not sign up before the expiration date, you must request a new code.     [employer] Activation Code: Activation code not generated  Current [employer] Status: Active     If you have questions, you can email [email or web address] or call [phone] to talk to our [employer] staff. Remember, [employer] is NOT to be used for urgent needs. For medical emergencies, dial 911.     IF YOU SMOKE OR USE TOBACCO PLEASE READ THE FOLLOWING:  Why is smoking bad for me?  Smoking increases the risk of heart disease, lung disease, vascular disease, stroke, and cancer. If you smoke, STOP!        IF YOU SMOKE OR USE TOBACCO PLEASE READ THE FOLLOWING:  Why is smoking bad for me?  Smoking increases the risk of heart disease, lung disease, vascular disease, stroke, and cancer. If you smoke, STOP!     For more information:  Quit Now [place]  -QUIT-NOW  https://[place].quitlogix.org/en-[place]/  I advised the patient of the risks in continuing to use tobacco and recommended complete cessation, The inherent risks including the risk of disability,  development of a malignancy and/or death was discussed.  The patient indicated understanding.

## 2022-02-16 NOTE — PROGRESS NOTES
YOB: 1950  Location: [facility]  Location Address: [address] [place] [address], [facility], [address] [place] [address]  Location Phone: [phone]    Chief Complaint   Patient presents with   • Follow-up       History of Present Illness  Blanca Martin is a 71 y.o. female.  Blanca Martin is here for follow up of ENT complaints. Blanca Martin is status post Laser excision anterior for mouth leukoplakia on 21. Patient has numbness of her tongue she states this has improved somewhat, but she does not have full feeling back in her tongue.   She has not noticed any new areas of concern.   Patient continues to smoking 1 ppd        Past Medical History:   Diagnosis Date   • Arthritis    • Chronic back pain    • Chronic kidney disease     stage 2   • COVID-19 vaccine series started    • Depression    • Diabetes mellitus (HCC)    • Elevated cholesterol    • H/O blood clots    • Hyperlipidemia    • Hypertension    • Leukoplakia of oral cavity    • PONV (postoperative nausea and vomiting)    • Spinal headache    • Vascular disease        Past Surgical History:   Procedure Laterality Date   • ANTERIOR CERVICAL DISCECTOMY W/ FUSION N/A 2018    Procedure: ANTERIOR CERVICAL DISCECTOMY FUSION C4-5, C6-7 WITH INSTRUMENTATION C4-7;  Surgeon: JAXSON Rm MD;  Location: [facility] OR;  Service: Orthopedic Spine   • APPENDECTOMY     • BACK SURGERY     • CARDIAC CATHETERIZATION     • CARPAL TUNNEL RELEASE Bilateral    • CERVICAL FUSION      C5-6   •  SECTION     • CHOLECYSTECTOMY     • GANGLION CYST EXCISION Right    • HERNIA REPAIR      VENTRAL    • HYSTERECTOMY     • JOINT REPLACEMENT Bilateral 2012 KNEES   • KNEE SURGERY Left     Cleaned joint out   • LAMINECTOMY THORACIC SPINE W/ PLACEMENT SPINAL CORD STIMULATOR     • LARYNGOSCOPY N/A 2021    Procedure: MICRODIRECT LARYNGOSCOPY WITH LASER EXCISION OF LESION OF THE ANTERIOR FLOOR OF MOUTH;  Surgeon: Peter Navarro  MD Ricky;  Location:  [facility] OR;  Service: ENT;  Laterality: N/A;   • LEG SURGERY      blood clot removed   • LUMBAR FUSION  2010 2015    L2-5/L5S1   • NASAL SEPTUM SURGERY  2005   • NECK SURGERY     • ORAL LESION EXCISION/BIOPSY N/A 7/23/2021    Procedure: EXCISION OF LESION OF THE ANTERIOR FLOOR OF MOUTH;  Surgeon: Peter Navarro MD;  Location:  [facility] OR;  Service: ENT;  Laterality: N/A;   • RADIOFREQUENCY ABLATION Right 2014   • SHOULDER SURGERY Left     Fatty tumor removed   • SPINAL CORD STIMULATOR IMPLANT  2021   • TONSILLECTOMY     • TRIGGER FINGER RELEASE Left 2009   • VEIN LIGATION AND STRIPPING Left        Outpatient Medications Marked as Taking for the 2/16/22 encounter (Office Visit) with Maddie Mustafa APRN   Medication Sig Dispense Refill   • aspirin 81 MG EC tablet Take 81 mg by mouth Daily. Dr. Lassiter     • atorvastatin (LIPITOR) 20 MG tablet Take 20 mg by mouth Every Night.     • bisoprolol (ZEBeta) 10 MG tablet Take 10 mg by mouth Daily.     • busPIRone (BUSPAR) 15 MG tablet Take 15 mg by mouth Daily As Needed (AS NEEDED FOR ANXIETY).     • CloNIDine (CATAPRES) 0.1 MG tablet Take 0.1 mg by mouth Daily As Needed for High Blood Pressure (SYSOVER 160 COREEN. OVER 120).     • diclofenac (VOLTAREN) 75 MG EC tablet Take 75 mg by mouth 2 (Two) Times a Day.     • doxazosin (CARDURA) 2 MG tablet Take 2 mg by mouth Daily.     • DULoxetine (CYMBALTA) 60 MG capsule Take 60 mg by mouth Daily.     • ergocalciferol (ERGOCALCIFEROL) 1.25 MG (75442 UT) capsule ergocalciferol (vitamin D2) 1,250 mcg (50,000 unit) capsule     • fluticasone (FLONASE) 50 MCG/ACT nasal spray 2 sprays into the nostril(s) as directed by provider Daily. 16 g 11   • glimepiride (AMARYL) 2 MG tablet Take 2 mg by mouth Daily As Needed (IF BS OVER 150).     • latanoprost (XALATAN) 0.005 % ophthalmic solution INSTILL 1 DROP INTO BOTH EYES EVERY DAY AT NIGHT     • metFORMIN (GLUCOPHAGE) 1000 MG tablet Take 1,000 mg by mouth 2 (Two) Times a  Day.     • naloxone (Narcan) 4 MG/0.1ML nasal spray Narcan 4 mg/actuation nasal spray     • oxyCODONE (OXY-IR) 5 MG capsule Take 5 mg by mouth.     • oxyCODONE-acetaminophen (PERCOCET) 5-325 MG per tablet      • tiZANidine (ZANAFLEX) 4 MG tablet Take 4 mg by mouth Every 8 (Eight) Hours As Needed for Muscle Spasms.     • True Metrix Blood Glucose Test test strip USE TO TEST BLODD SUGAR DAILY     • [DISCONTINUED] bisoprolol (ZEBeta) 5 MG tablet Take 5 mg by mouth Daily.         Codeine, Amoxicillin-pot clavulanate, Ace inhibitors, Advair diskus [fluticasone-salmeterol], Ascorbate, Avapro [irbesartan], Hydrochlorothiazide, Irbesartan-hydrochlorothiazide, Simvastatin, and Thiazide-type diuretics    Family History   Problem Relation Age of Onset   • Cancer Father    • Lung cancer Mother    • Heart disease Mother        Social History     Socioeconomic History   • Marital status:    Tobacco Use   • Smoking status: Current Every Day Smoker     Packs/day: 1.00     Years: 46.00     Pack years: 46.00     Types: Cigarettes     Start date: 1980   • Smokeless tobacco: Never Used   Vaping Use   • Vaping Use: Never used   Substance and Sexual Activity   • Alcohol use: No   • Drug use: Not Currently   • Sexual activity: Defer       Review of Systems   Constitutional: Negative.    HENT:        Admits tongue numbness      Eyes: Negative.    Respiratory: Negative.    Cardiovascular: Negative.    Gastrointestinal: Negative.    Genitourinary: Negative.    Musculoskeletal: Negative.    Allergic/Immunologic: Negative.    Neurological: Negative.        Vitals:    02/16/22 1014   BP: 152/73   Pulse: 69   Temp: 96.8 °F (36 °C)       Body mass index is 32.03 kg/m².    Objective     Physical Exam  Vitals reviewed.   Constitutional:       Appearance: She is obese.   HENT:      Head: Normocephalic.      Right Ear: Hearing and external ear normal.      Left Ear: Hearing and external ear normal.      Nose: Nose normal.      Mouth/Throat:       Comments: No edema noted to floor of mouth or tongue   No masses appreciated via visualization or manual palpation     Neurological:      Mental Status: She is alert.         Assessment/Plan   Diagnoses and all orders for this visit:    1. Oral leukoplakia ventral tongue (Primary)    2. Tobacco use disorder    3. Stage 2 chronic kidney disease    4. Non-seasonal allergic rhinitis due to pollen      * Surgery not found *  No orders of the defined types were placed in this encounter.    Return in about 3 months (around 2022) for Recheck.     Discussed at length with patient risks of continuing to smoke including delayed healing of previous surgical excision   Call or return for problems       Patient Instructions   CONTACT INFORMATION:  The main office phone number is [phone]. For emergencies after hours and on weekends, this number will convert over to our answering service and the on call provider will answer. Please try to keep non emergent phone calls/ questions to office hours 9am-5pm Monday through Friday.      [employer]  As an alternative, you can sign up and use the Epic MyChart system for more direct and quicker access for non emergent questions/ problems.  [employer] allows you to send messages to your doctor, view your test results, renew your prescriptions, schedule appointments, and more. To sign up, go to [employer] and click on the Sign Up Now link in the New User? box. Enter your [employer] Activation Code exactly as it appears below along with the last four digits of your Social Security Number and your Date of Birth () to complete the sign-up process. If you do not sign up before the expiration date, you must request a new code.     [employer] Activation Code: Activation code not generated  Current [employer] Status: Active     If you have questions, you can email [email or web address] or call [phone] to talk to our [employer] staff. Remember, [employer] is NOT  to be used for urgent needs. For medical emergencies, dial 911.     IF YOU SMOKE OR USE TOBACCO PLEASE READ THE FOLLOWING:  Why is smoking bad for me?  Smoking increases the risk of heart disease, lung disease, vascular disease, stroke, and cancer. If you smoke, STOP!        IF YOU SMOKE OR USE TOBACCO PLEASE READ THE FOLLOWING:  Why is smoking bad for me?  Smoking increases the risk of heart disease, lung disease, vascular disease, stroke, and cancer. If you smoke, STOP!     For more information:  Quit Now [place]  1-800-QUIT-NOW  https://[place].quitlogix.org/en-[place]/  I advised the patient of the risks in continuing to use tobacco and recommended complete cessation, The inherent risks including the risk of disability, development of a malignancy and/or death was discussed.  The patient indicated understanding.

## 2022-02-21 DIAGNOSIS — Z01.818 PRE-OP TESTING: Primary | ICD-10-CM

## 2022-02-21 DIAGNOSIS — Z01.818 PRE-OP TESTING: ICD-10-CM

## 2022-02-21 LAB
ANION GAP SERPL CALCULATED.3IONS-SCNC: 11 MMOL/L (ref 7–19)
BUN BLDV-MCNC: 19 MG/DL (ref 8–23)
CALCIUM SERPL-MCNC: 9.2 MG/DL (ref 8.8–10.2)
CHLORIDE BLD-SCNC: 101 MMOL/L (ref 98–111)
CO2: 26 MMOL/L (ref 22–29)
CREAT SERPL-MCNC: 1 MG/DL (ref 0.5–0.9)
GFR AFRICAN AMERICAN: >59
GFR NON-AFRICAN AMERICAN: 54
GLUCOSE BLD-MCNC: 226 MG/DL (ref 74–109)
HCT VFR BLD CALC: 38.9 % (ref 37–47)
HEMOGLOBIN: 12.4 G/DL (ref 12–16)
MCH RBC QN AUTO: 32 PG (ref 27–31)
MCHC RBC AUTO-ENTMCNC: 31.9 G/DL (ref 33–37)
MCV RBC AUTO: 100.3 FL (ref 81–99)
PDW BLD-RTO: 13.2 % (ref 11.5–14.5)
PLATELET # BLD: 213 K/UL (ref 130–400)
PMV BLD AUTO: 9.4 FL (ref 9.4–12.3)
POTASSIUM SERPL-SCNC: 5 MMOL/L (ref 3.5–5)
RBC # BLD: 3.88 M/UL (ref 4.2–5.4)
SARS-COV-2, PCR: NOT DETECTED
SODIUM BLD-SCNC: 138 MMOL/L (ref 136–145)
WBC # BLD: 7.1 K/UL (ref 4.8–10.8)

## 2022-02-22 RX ORDER — SODIUM CHLORIDE 0.9 % (FLUSH) 0.9 %
10 SYRINGE (ML) INJECTION PRN
Status: CANCELLED | OUTPATIENT
Start: 2022-02-22

## 2022-02-22 RX ORDER — SODIUM CHLORIDE 9 MG/ML
INJECTION, SOLUTION INTRAVENOUS CONTINUOUS
Status: CANCELLED | OUTPATIENT
Start: 2022-02-22

## 2022-02-22 RX ORDER — CLONIDINE HYDROCHLORIDE 0.1 MG/1
0.1 TABLET ORAL PRN
Status: CANCELLED | OUTPATIENT
Start: 2022-02-22

## 2022-02-22 RX ORDER — SODIUM CHLORIDE 9 MG/ML
25 INJECTION, SOLUTION INTRAVENOUS PRN
Status: CANCELLED | OUTPATIENT
Start: 2022-02-22

## 2022-02-22 RX ORDER — SODIUM CHLORIDE 0.9 % (FLUSH) 0.9 %
10 SYRINGE (ML) INJECTION EVERY 12 HOURS SCHEDULED
Status: CANCELLED | OUTPATIENT
Start: 2022-02-22

## 2022-02-22 RX ORDER — ASPIRIN 81 MG/1
81 TABLET ORAL ONCE
Status: CANCELLED | OUTPATIENT
Start: 2022-02-22 | End: 2022-02-22

## 2022-02-22 NOTE — H&P (VIEW-ONLY)
Maco Rivera (:  1950) is a 66 y.o. female,New patient, here for evaluation of the following chief complaint(s): Establish Care          SUBJECTIVE/OBJECTIVE:  Сергей Johnson has a history of peripheral vascular disease of the lower extremities. She has had this for < 1 year. Current treatment includes none. Сергей Johnson has not had new wounds. Recently, she reports claudication at a distance of  2-3 minutes. Сергей Johnson reports that the left leg is more significant than the right. She reports claudication is worsened and is mostly in the form of crampy type pain starting in the hips and calves. She has a short recovery time. This is reproducible in nature. She reports ischemic rest pain 0 times per night. She reports walking with cart does not help. She has a known history of of varicose veins and chronic venous insufficiency. She reports prominent veins on the  Left leg(s), lower extremity edema on the  Left leg(s), the veins are painful -- severity:  moderate and pain is aggravated by upright posture. She reports previous treatment includes none. She does not have a history of spontaneous rupture. She stopped wearing hose because she cannot get them on due to her back. She has had previous right GSV ablation    Differential diagnosis for leg pain includes but is not limited to: varicose veins, peripheral vascular disease, arthritic pain, DVT, lumbar disc disease, and neurogenic pain.           Maco Rivera is a 66 y.o. female with the following history as recorded in [facility]:  Patient Active Problem List    Diagnosis Date Noted    Osteoarthritis 2022    Failed back syndrome of lumbar spine 2021    Lumbar radiculopathy 2021    Duodenitis 2020    Acute pancreatitis 2018    Pancreatitis, recurrent 2018    Productive cough 10/26/2016    History of adenomatous polyp of colon 10/26/2016    Irritable bowel syndrome with constipation 2014    Narcotic dependence ([facility] [address] 11/04/2014    Abdominal cramping 11/04/2014    Leg pain, bilateral 08/28/2014    Venous insufficiency of both lower extremities 08/28/2014    Varicose vein 06/24/2014    Chronic back pain 02/04/2013    HTN (hypertension) 02/04/2013    History of colon polyps 12/03/2012    Bloating 12/03/2012    Hemorrhoids 12/03/2012    Gastroesophageal reflux disease 06/20/2011    Constipation, chronic 06/20/2011    Colon polyps 06/20/2011     Current Outpatient Medications   Medication Sig Dispense Refill    oxyCODONE 5 MG capsule Take 5 mg by mouth every 12 hours.  diclofenac (VOLTAREN) 75 MG EC tablet Take 1 tablet by mouth 2 times daily 1 tablet 0    fluticasone (FLONASE) 50 MCG/ACT nasal spray 2 sprays by Each Nostril route daily      doxazosin (CARDURA) 1 MG tablet Take 2 mg by mouth nightly      Cholecalciferol (VITAMIN D3) 1.25 MG (53903 UT) CAPS Take 1 capsule by mouth once a week      tiZANidine (ZANAFLEX) 4 MG tablet Take 4 mg by mouth every 8 hours as needed      busPIRone (BUSPAR) 15 MG tablet Take 15 mg by mouth daily as needed      cloNIDine (CATAPRES) 0.1 MG tablet Take 0.1 mg by mouth daily as needed for High Blood Pressure      glimepiride (AMARYL) 2 MG tablet Take 2 mg by mouth daily as needed Indications: prn high blood sugar       metFORMIN (GLUCOPHAGE) 500 MG tablet Take 1,000 mg by mouth 2 times daily       bisoprolol (ZEBETA) 5 MG tablet Take 10 mg by mouth daily       atorvastatin (LIPITOR) 20 MG tablet Take 20 mg by mouth daily.  DULoxetine HCl (CYMBALTA PO) Take 60 mg by mouth daily.  Latanoprost (XELPROS) 0.005 % EMUL Apply 1 drop to eye nightly each eye (Patient not taking: Reported on 2/3/2022)       No current facility-administered medications for this visit.      Allergies: Codeine, Amoxicillin-pot clavulanate, Best-c [ascorbate], Hctz, Hydrochlorothiazide, Irbesartan, Irbesartan-hydrochlorothiazide, Simvastatin, and Thiazide-type diuretics  Past Medical History:   Diagnosis Date    Carpal tunnel syndrome     Chronic back pain     Diabetes ([address]     DVT (deep venous thrombosis) (HCC)     History of blood transfusion     Hx of blood clots     DVT-POST OP  SECTION, DVT X 2 SINCE THEN    Hyperlipidemia     Hypertension     Osteoarthritis     Pancreatitis     Tendonitis     Unspecified sleep apnea     Pt does not sleep with C-pap machine    Varicose vein 2014     Past Surgical History:   Procedure Laterality Date    APPENDECTOMY      BACK SURGERY      x2    BACK SURGERY      stimulator    CARPAL TUNNEL RELEASE      right hand     SECTION      X 3    CHOLECYSTECTOMY      COLONOSCOPY  2008    COLONOSCOPY  10/25/2011    Dr Andriy Ward: tubular adenoma    CYST REMOVAL      GANGLION CYST REMOVAL    ERCP  2009    HYSTERECTOMY      TOTAL    JOINT REPLACEMENT Bilateral     Knee    KNEE ARTHROSCOPY      x2    LAMINECTOMY N/A 2021    THORACIC LAMINECTOMY FOR SPINAL CORD STIM WITH NEUROMONITORING performed by Tona Alford MD at  pulled   [address]      UPPER GASTROINTESTINAL ENDOSCOPY  2007    UPPER GASTROINTESTINAL ENDOSCOPY  2011    Dr Andriy Ward: gastritis, small hiatal hernia    VARICOSE VEIN SURGERY Right 2014    GSV RF Ablation    VEIN SURGERY  1967    ?  left leg vein stripping     Family History   Problem Relation Age of Onset    Heart Disease Mother     Kidney Disease Mother     Cancer Father         liver cancer    Liver Cancer Father     High Blood Pressure Sister     High Blood Pressure Brother     High Cholesterol Brother     Heart Disease Brother     Colon Cancer Neg Hx     Colon Polyps Neg Hx     Esophageal Cancer Neg Hx     Liver Disease Neg Hx     Rectal Cancer Neg Hx     Stomach Cancer Neg Hx      Social History     Tobacco Use    Smoking status: Current Every Day Smoker Packs/day: 1.00     Years: 40.00     Pack years: 40.00     Types: Cigarettes    Smokeless tobacco: Never Used   Substance Use Topics    Alcohol use: No       ROS  Eyes  no sudden vision change or amaurosis. Respiratory  no significant shortness of breath,  Cardiovascular  no chest pain or syncope. No  significant leg swelling. No claudication. Musculoskeletal  no gait disturbance  Skin  no new wound. Neurologic   No speech difficulty or lateralizing weakness. All other review of systems are negative. No flowsheet data found. Physical Exam    Constitutional  well developed, well nourished. No diaphoresis or acute distress. Neck- ROM appears normal  Extremities -No cyanosis, clubbing, has edema. No signs atheroembolic event. Innumerable varicose veins of both legs and feet. No wounds   Pulmonary  effort appears normal.  No respiratory distress. No accessory muscle use  Neurologic  alert and oriented X 3. Cranial Nerves II-XII grossly intact  Skin  intact. No rash, erythema, or pallor. Psychiatric  mood, affect, and behavior appear normal.  Judgment and thought processes appear normal.    Risk factors for atherosclerosis of all vascular beds have been reviewed with the patient including:  Family history, tobacco abuse in all forms, elevated cholesterol, hyperlipidemia, and diabetes. Lower extremity arterial study: Right LAZ 1.13, Left LAZ 0.64. Individual films reviewed: Yes. These results were reviewed with the patient. Disease process is acute uncomplicated      Reviewed on this visit: pcp notes    Reviewed previous studies including: shameka  Individual images were reviewed. I agree with the findings  Results were discussed with the patient. Options have been discussed with the patient including continued medical management vs. proceeding with angiogram with runoff and possible angioplasty/atherectomy/stent . Patient has opted to proceed with cmm for now.   Risks have been discussed with the patient including but not limited to MI, death, CVA, bleed, nerve injury, and infection. ASSESSMENT/PLAN:  1. Atheroscler of native artery of both legs with intermit claudication ([address]  2. Venous insufficiency  3. Varicose veins of both lower extremities with pain  4. Leg swelling      Discussed management of shameka which includes:  start asa to reduce risk of arterial thrombosis and to decrease rate of plaque buildup  Strongly encourage start/continue statin therapy -   Recommend no smoking - discussed the effect tobacco has on illness;   Plan aortogram with bilateral lower extremity arteriogram, possible left lower extremity atherectomy, balloon angioplasty, stent.

## 2022-02-22 NOTE — H&P
Katherine Almonte (:  1950) is a 66 y.o. female,New patient, here for evaluation of the following chief complaint(s): Establish Care          SUBJECTIVE/OBJECTIVE:  Shruthi Levin has a history of peripheral vascular disease of the lower extremities. She has had this for < 1 year. Current treatment includes none. Shruthi Levin has not had new wounds. Recently, she reports claudication at a distance of  2-3 minutes. Shruthi Levin reports that the left leg is more significant than the right. She reports claudication is worsened and is mostly in the form of crampy type pain starting in the hips and calves. She has a short recovery time. This is reproducible in nature. She reports ischemic rest pain 0 times per night. She reports walking with cart does not help. She has a known history of of varicose veins and chronic venous insufficiency. She reports prominent veins on the  Left leg(s), lower extremity edema on the  Left leg(s), the veins are painful -- severity:  moderate and pain is aggravated by upright posture. She reports previous treatment includes none. She does not have a history of spontaneous rupture. She stopped wearing hose because she cannot get them on due to her back. She has had previous right GSV ablation    Differential diagnosis for leg pain includes but is not limited to: varicose veins, peripheral vascular disease, arthritic pain, DVT, lumbar disc disease, and neurogenic pain.           Katherine Almonte is a 66 y.o. female with the following history as recorded in [facility]:  Patient Active Problem List    Diagnosis Date Noted    Osteoarthritis 2022    Failed back syndrome of lumbar spine 2021    Lumbar radiculopathy 2021    Duodenitis 2020    Acute pancreatitis 2018    Pancreatitis, recurrent 2018    Productive cough 10/26/2016    History of adenomatous polyp of colon 10/26/2016    Irritable bowel syndrome with constipation 2014    Narcotic dependence ([facility] [address] 11/04/2014    Abdominal cramping 11/04/2014    Leg pain, bilateral 08/28/2014    Venous insufficiency of both lower extremities 08/28/2014    Varicose vein 06/24/2014    Chronic back pain 02/04/2013    HTN (hypertension) 02/04/2013    History of colon polyps 12/03/2012    Bloating 12/03/2012    Hemorrhoids 12/03/2012    Gastroesophageal reflux disease 06/20/2011    Constipation, chronic 06/20/2011    Colon polyps 06/20/2011     Current Outpatient Medications   Medication Sig Dispense Refill    oxyCODONE 5 MG capsule Take 5 mg by mouth every 12 hours.  diclofenac (VOLTAREN) 75 MG EC tablet Take 1 tablet by mouth 2 times daily 1 tablet 0    fluticasone (FLONASE) 50 MCG/ACT nasal spray 2 sprays by Each Nostril route daily      doxazosin (CARDURA) 1 MG tablet Take 2 mg by mouth nightly      Cholecalciferol (VITAMIN D3) 1.25 MG (83967 UT) CAPS Take 1 capsule by mouth once a week      tiZANidine (ZANAFLEX) 4 MG tablet Take 4 mg by mouth every 8 hours as needed      busPIRone (BUSPAR) 15 MG tablet Take 15 mg by mouth daily as needed      cloNIDine (CATAPRES) 0.1 MG tablet Take 0.1 mg by mouth daily as needed for High Blood Pressure      glimepiride (AMARYL) 2 MG tablet Take 2 mg by mouth daily as needed Indications: prn high blood sugar       metFORMIN (GLUCOPHAGE) 500 MG tablet Take 1,000 mg by mouth 2 times daily       bisoprolol (ZEBETA) 5 MG tablet Take 10 mg by mouth daily       atorvastatin (LIPITOR) 20 MG tablet Take 20 mg by mouth daily.  DULoxetine HCl (CYMBALTA PO) Take 60 mg by mouth daily.  Latanoprost (XELPROS) 0.005 % EMUL Apply 1 drop to eye nightly each eye (Patient not taking: Reported on 2/3/2022)       No current facility-administered medications for this visit.      Allergies: Codeine, Amoxicillin-pot clavulanate, Best-c [ascorbate], Hctz, Hydrochlorothiazide, Irbesartan, Irbesartan-hydrochlorothiazide, Simvastatin, and Thiazide-type diuretics  Past Medical History:   Diagnosis Date    Carpal tunnel syndrome     Chronic back pain     Diabetes ([address]     DVT (deep venous thrombosis) (HCC)     History of blood transfusion     Hx of blood clots     DVT-POST OP  SECTION, DVT X 2 SINCE THEN    Hyperlipidemia     Hypertension     Osteoarthritis     Pancreatitis     Tendonitis     Unspecified sleep apnea     Pt does not sleep with C-pap machine    Varicose vein 2014     Past Surgical History:   Procedure Laterality Date    APPENDECTOMY      BACK SURGERY      x2    BACK SURGERY      stimulator    CARPAL TUNNEL RELEASE      right hand     SECTION      X 3    CHOLECYSTECTOMY      COLONOSCOPY  2008    COLONOSCOPY  10/25/2011    Dr Shirley Levy: tubular adenoma    CYST REMOVAL      GANGLION CYST REMOVAL    ERCP  2009    HYSTERECTOMY      TOTAL    JOINT REPLACEMENT Bilateral     Knee    KNEE ARTHROSCOPY      x2    LAMINECTOMY N/A 2021    THORACIC LAMINECTOMY FOR SPINAL CORD STIM WITH NEUROMONITORING performed by Rocio Perez MD at [facility] pulled   [address]      UPPER GASTROINTESTINAL ENDOSCOPY  2007    UPPER GASTROINTESTINAL ENDOSCOPY  2011    Dr Shirley Levy: gastritis, small hiatal hernia    VARICOSE VEIN SURGERY Right 2014    GSV RF Ablation    VEIN SURGERY  1967    ?  left leg vein stripping     Family History   Problem Relation Age of Onset    Heart Disease Mother     Kidney Disease Mother     Cancer Father         liver cancer    Liver Cancer Father     High Blood Pressure Sister     High Blood Pressure Brother     High Cholesterol Brother     Heart Disease Brother     Colon Cancer Neg Hx     Colon Polyps Neg Hx     Esophageal Cancer Neg Hx     Liver Disease Neg Hx     Rectal Cancer Neg Hx     Stomach Cancer Neg Hx      Social History     Tobacco Use    Smoking status: Current Every Day Smoker Packs/day: 1.00     Years: 40.00     Pack years: 40.00     Types: Cigarettes    Smokeless tobacco: Never Used   Substance Use Topics    Alcohol use: No       ROS  Eyes  no sudden vision change or amaurosis. Respiratory  no significant shortness of breath,  Cardiovascular  no chest pain or syncope. No  significant leg swelling. No claudication. Musculoskeletal  no gait disturbance  Skin  no new wound. Neurologic   No speech difficulty or lateralizing weakness. All other review of systems are negative. No flowsheet data found. Physical Exam    Constitutional  well developed, well nourished. No diaphoresis or acute distress. Neck- ROM appears normal  Extremities -No cyanosis, clubbing, has edema. No signs atheroembolic event. Innumerable varicose veins of both legs and feet. No wounds   Pulmonary  effort appears normal.  No respiratory distress. No accessory muscle use  Neurologic  alert and oriented X 3. Cranial Nerves II-XII grossly intact  Skin  intact. No rash, erythema, or pallor. Psychiatric  mood, affect, and behavior appear normal.  Judgment and thought processes appear normal.    Risk factors for atherosclerosis of all vascular beds have been reviewed with the patient including:  Family history, tobacco abuse in all forms, elevated cholesterol, hyperlipidemia, and diabetes. Lower extremity arterial study: Right LAZ 1.13, Left LAZ 0.64. Individual films reviewed: Yes. These results were reviewed with the patient. Disease process is acute uncomplicated      Reviewed on this visit: pcp notes    Reviewed previous studies including: shameka  Individual images were reviewed. I agree with the findings  Results were discussed with the patient. Options have been discussed with the patient including continued medical management vs. proceeding with angiogram with runoff and possible angioplasty/atherectomy/stent . Patient has opted to proceed with cmm for now.   Risks have been discussed with the patient including but not limited to MI, death, CVA, bleed, nerve injury, and infection. ASSESSMENT/PLAN:  1. Atheroscler of native artery of both legs with intermit claudication ([address]  2. Venous insufficiency  3. Varicose veins of both lower extremities with pain  4. Leg swelling      Discussed management of shameka which includes:  start asa to reduce risk of arterial thrombosis and to decrease rate of plaque buildup  Strongly encourage start/continue statin therapy -   Recommend no smoking - discussed the effect tobacco has on illness;   Plan aortogram with bilateral lower extremity arteriogram, possible left lower extremity atherectomy, balloon angioplasty, stent.

## 2022-02-23 ENCOUNTER — HOSPITAL ENCOUNTER (OUTPATIENT)
Dept: INTERVENTIONAL RADIOLOGY/VASCULAR | Age: 72
Discharge: HOME | End: 2022-02-23
Payer: MEDICARE

## 2022-02-23 VITALS
SYSTOLIC BLOOD PRESSURE: 169 MMHG | RESPIRATION RATE: 18 BRPM | OXYGEN SATURATION: 92 % | BODY MASS INDEX: 31.89 KG/M2 | HEART RATE: 83 BPM | WEIGHT: 180 LBS | HEIGHT: 63 IN | TEMPERATURE: 98 F | DIASTOLIC BLOOD PRESSURE: 70 MMHG

## 2022-02-23 DIAGNOSIS — I70.219 ATHEROSCLEROSIS WITH CLAUDICATION OF EXTREMITY (HCC): ICD-10-CM

## 2022-02-23 DIAGNOSIS — I73.9 PVD (PERIPHERAL VASCULAR DISEASE) (HCC): ICD-10-CM

## 2022-02-23 LAB
GLUCOSE BLD-MCNC: 241 MG/DL (ref 70–99)
HBA1C MFR BLD: 8.8 % (ref 4–6)
PERFORMED ON: ABNORMAL

## 2022-02-23 PROCEDURE — 37221 HC PLASTY ILIAC W STENT: CPT

## 2022-02-23 PROCEDURE — 2709999900 IR AORTAGRAM

## 2022-02-23 PROCEDURE — 6360000002 HC RX W HCPCS: Performed by: SURGERY

## 2022-02-23 PROCEDURE — 37221 PR REVSC OPN/PRQ ILIAC ART W/STNT PLMT & ANGIOPLSTY: CPT | Performed by: SURGERY

## 2022-02-23 PROCEDURE — 2580000003 HC RX 258: Performed by: SURGERY

## 2022-02-23 PROCEDURE — 2580000003 HC RX 258: Performed by: NURSE PRACTITIONER

## 2022-02-23 PROCEDURE — 75625 CONTRAST EXAM ABDOMINL AORTA: CPT | Performed by: SURGERY

## 2022-02-23 PROCEDURE — 83036 HEMOGLOBIN GLYCOSYLATED A1C: CPT

## 2022-02-23 PROCEDURE — 6360000002 HC RX W HCPCS: Performed by: NURSE PRACTITIONER

## 2022-02-23 PROCEDURE — 6360000004 HC RX CONTRAST MEDICATION: Performed by: SURGERY

## 2022-02-23 PROCEDURE — 99153 MOD SED SAME PHYS/QHP EA: CPT

## 2022-02-23 PROCEDURE — 37225 PR REVSC OPN/PRQ FEM/POP W/ATHRC/ANGIOP SM VSL: CPT | Performed by: SURGERY

## 2022-02-23 PROCEDURE — 6370000000 HC RX 637 (ALT 250 FOR IP): Performed by: SURGERY

## 2022-02-23 PROCEDURE — 75625 CONTRAST EXAM ABDOMINL AORTA: CPT

## 2022-02-23 PROCEDURE — 37186 SEC ART THROMBECTOMY ADD-ON: CPT | Performed by: SURGERY

## 2022-02-23 PROCEDURE — 82947 ASSAY GLUCOSE BLOOD QUANT: CPT

## 2022-02-23 PROCEDURE — 75774 ARTERY X-RAY EACH VESSEL: CPT

## 2022-02-23 PROCEDURE — 75716 ARTERY X-RAYS ARMS/LEGS: CPT

## 2022-02-23 PROCEDURE — 37224 HC PLASTY UNI FEMPOP: CPT

## 2022-02-23 PROCEDURE — 2500000003 HC RX 250 WO HCPCS: Performed by: SURGERY

## 2022-02-23 PROCEDURE — 75716 ARTERY X-RAYS ARMS/LEGS: CPT | Performed by: SURGERY

## 2022-02-23 PROCEDURE — 36415 COLL VENOUS BLD VENIPUNCTURE: CPT

## 2022-02-23 PROCEDURE — 6370000000 HC RX 637 (ALT 250 FOR IP): Performed by: NURSE PRACTITIONER

## 2022-02-23 PROCEDURE — 93005 ELECTROCARDIOGRAM TRACING: CPT | Performed by: SURGERY

## 2022-02-23 PROCEDURE — 99152 MOD SED SAME PHYS/QHP 5/>YRS: CPT

## 2022-02-23 RX ORDER — FENTANYL CITRATE 50 UG/ML
INJECTION, SOLUTION INTRAMUSCULAR; INTRAVENOUS
Status: COMPLETED | OUTPATIENT
Start: 2022-02-23 | End: 2022-02-23

## 2022-02-23 RX ORDER — IODIXANOL 320 MG/ML
INJECTION, SOLUTION INTRAVASCULAR
Status: COMPLETED | OUTPATIENT
Start: 2022-02-23 | End: 2022-02-23

## 2022-02-23 RX ORDER — ACETAMINOPHEN 325 MG/1
650 TABLET ORAL EVERY 4 HOURS PRN
Status: DISCONTINUED | OUTPATIENT
Start: 2022-02-23 | End: 2022-02-25 | Stop reason: HOSPADM

## 2022-02-23 RX ORDER — MIDAZOLAM HYDROCHLORIDE 1 MG/ML
INJECTION INTRAMUSCULAR; INTRAVENOUS
Status: COMPLETED | OUTPATIENT
Start: 2022-02-23 | End: 2022-02-23

## 2022-02-23 RX ORDER — CLOPIDOGREL BISULFATE 75 MG/1
75 TABLET ORAL DAILY
Status: DISCONTINUED | OUTPATIENT
Start: 2022-02-24 | End: 2022-02-25 | Stop reason: HOSPADM

## 2022-02-23 RX ORDER — HEPARIN SODIUM 5000 [USP'U]/ML
INJECTION, SOLUTION INTRAVENOUS; SUBCUTANEOUS
Status: COMPLETED | OUTPATIENT
Start: 2022-02-23 | End: 2022-02-23

## 2022-02-23 RX ORDER — SODIUM CHLORIDE 9 MG/ML
25 INJECTION, SOLUTION INTRAVENOUS PRN
Status: DISCONTINUED | OUTPATIENT
Start: 2022-02-23 | End: 2022-02-25 | Stop reason: HOSPADM

## 2022-02-23 RX ORDER — NITROGLYCERIN 20 MG/100ML
INJECTION INTRAVENOUS CONTINUOUS PRN
Status: COMPLETED | OUTPATIENT
Start: 2022-02-23 | End: 2022-02-23

## 2022-02-23 RX ORDER — ONDANSETRON 2 MG/ML
4 INJECTION INTRAMUSCULAR; INTRAVENOUS EVERY 8 HOURS PRN
Status: DISCONTINUED | OUTPATIENT
Start: 2022-02-23 | End: 2022-02-25 | Stop reason: HOSPADM

## 2022-02-23 RX ORDER — ASPIRIN 81 MG/1
81 TABLET ORAL ONCE
Status: COMPLETED | OUTPATIENT
Start: 2022-02-23 | End: 2022-02-23

## 2022-02-23 RX ORDER — CLOPIDOGREL BISULFATE 75 MG/1
75 TABLET ORAL DAILY
Qty: 30 TABLET | Refills: 3 | Status: SHIPPED | OUTPATIENT
Start: 2022-02-23

## 2022-02-23 RX ORDER — ONDANSETRON 2 MG/ML
INJECTION INTRAMUSCULAR; INTRAVENOUS
Status: COMPLETED | OUTPATIENT
Start: 2022-02-23 | End: 2022-02-23

## 2022-02-23 RX ORDER — SODIUM CHLORIDE 0.9 % (FLUSH) 0.9 %
10 SYRINGE (ML) INJECTION PRN
Status: DISCONTINUED | OUTPATIENT
Start: 2022-02-23 | End: 2022-02-25 | Stop reason: HOSPADM

## 2022-02-23 RX ORDER — VERAPAMIL HYDROCHLORIDE 2.5 MG/ML
INJECTION, SOLUTION INTRAVENOUS
Status: COMPLETED | OUTPATIENT
Start: 2022-02-23 | End: 2022-02-23

## 2022-02-23 RX ORDER — HYDRALAZINE HYDROCHLORIDE 20 MG/ML
10 INJECTION INTRAMUSCULAR; INTRAVENOUS EVERY 6 HOURS PRN
Status: DISCONTINUED | OUTPATIENT
Start: 2022-02-23 | End: 2022-02-25 | Stop reason: HOSPADM

## 2022-02-23 RX ORDER — HYDRALAZINE HYDROCHLORIDE 20 MG/ML
INJECTION INTRAMUSCULAR; INTRAVENOUS
Status: COMPLETED | OUTPATIENT
Start: 2022-02-23 | End: 2022-02-23

## 2022-02-23 RX ORDER — SODIUM CHLORIDE 9 MG/ML
INJECTION, SOLUTION INTRAVENOUS CONTINUOUS
Status: DISCONTINUED | OUTPATIENT
Start: 2022-02-23 | End: 2022-02-25 | Stop reason: HOSPADM

## 2022-02-23 RX ORDER — SODIUM CHLORIDE 0.9 % (FLUSH) 0.9 %
10 SYRINGE (ML) INJECTION EVERY 12 HOURS SCHEDULED
Status: DISCONTINUED | OUTPATIENT
Start: 2022-02-23 | End: 2022-02-25 | Stop reason: HOSPADM

## 2022-02-23 RX ORDER — DEXTROSE MONOHYDRATE 25 G/50ML
12.5 INJECTION, SOLUTION INTRAVENOUS PRN
Status: DISCONTINUED | OUTPATIENT
Start: 2022-02-23 | End: 2022-02-23 | Stop reason: SDUPTHER

## 2022-02-23 RX ORDER — CLOPIDOGREL BISULFATE 75 MG/1
150 TABLET ORAL ONCE
Status: COMPLETED | OUTPATIENT
Start: 2022-02-23 | End: 2022-02-23

## 2022-02-23 RX ORDER — CLONIDINE HYDROCHLORIDE 0.1 MG/1
0.1 TABLET ORAL PRN
Status: DISCONTINUED | OUTPATIENT
Start: 2022-02-23 | End: 2022-02-25 | Stop reason: HOSPADM

## 2022-02-23 RX ORDER — ASPIRIN 81 MG/1
81 TABLET ORAL DAILY
COMMUNITY

## 2022-02-23 RX ORDER — DEXTROSE MONOHYDRATE 50 MG/ML
100 INJECTION, SOLUTION INTRAVENOUS PRN
Status: DISCONTINUED | OUTPATIENT
Start: 2022-02-23 | End: 2022-02-25 | Stop reason: HOSPADM

## 2022-02-23 RX ORDER — NICOTINE POLACRILEX 4 MG
15 LOZENGE BUCCAL PRN
Status: DISCONTINUED | OUTPATIENT
Start: 2022-02-23 | End: 2022-02-23 | Stop reason: CLARIF

## 2022-02-23 RX ADMIN — HEPARIN SODIUM 5000 UNITS: 5000 INJECTION INTRAVENOUS; SUBCUTANEOUS at 08:06

## 2022-02-23 RX ADMIN — HEPARIN SODIUM 6000 UNITS: 5000 INJECTION INTRAVENOUS; SUBCUTANEOUS at 08:34

## 2022-02-23 RX ADMIN — HYDRALAZINE HYDROCHLORIDE 10 MG: 20 INJECTION INTRAMUSCULAR; INTRAVENOUS at 13:38

## 2022-02-23 RX ADMIN — HYDRALAZINE HYDROCHLORIDE 10 MG: 20 INJECTION INTRAMUSCULAR; INTRAVENOUS at 09:15

## 2022-02-23 RX ADMIN — NITROGLYCERIN 5000 MCG: 20 INJECTION INTRAVENOUS at 08:43

## 2022-02-23 RX ADMIN — VERAPAMIL HYDROCHLORIDE 5 MG: 2.5 INJECTION, SOLUTION INTRAVENOUS at 08:44

## 2022-02-23 RX ADMIN — FENTANYL CITRATE 25 MCG: 50 INJECTION, SOLUTION INTRAMUSCULAR; INTRAVENOUS at 08:20

## 2022-02-23 RX ADMIN — Medication 1000 MG: at 08:00

## 2022-02-23 RX ADMIN — FENTANYL CITRATE 50 MCG: 50 INJECTION, SOLUTION INTRAMUSCULAR; INTRAVENOUS at 09:38

## 2022-02-23 RX ADMIN — CLONIDINE HYDROCHLORIDE 0.1 MG: 0.1 TABLET ORAL at 11:25

## 2022-02-23 RX ADMIN — ONDANSETRON 4 MG: 2 INJECTION INTRAMUSCULAR; INTRAVENOUS at 09:33

## 2022-02-23 RX ADMIN — IODIXANOL 175 ML: 320 INJECTION, SOLUTION INTRAVASCULAR at 09:51

## 2022-02-23 RX ADMIN — MIDAZOLAM 0.5 MG: 1 INJECTION INTRAMUSCULAR; INTRAVENOUS at 08:35

## 2022-02-23 RX ADMIN — FENTANYL CITRATE 25 MCG: 50 INJECTION, SOLUTION INTRAMUSCULAR; INTRAVENOUS at 09:33

## 2022-02-23 RX ADMIN — MIDAZOLAM 0.5 MG: 1 INJECTION INTRAMUSCULAR; INTRAVENOUS at 08:59

## 2022-02-23 RX ADMIN — SODIUM CHLORIDE: 9 INJECTION, SOLUTION INTRAVENOUS at 07:28

## 2022-02-23 RX ADMIN — HEPARIN SODIUM 1000 UNITS: 5000 INJECTION INTRAVENOUS; SUBCUTANEOUS at 09:35

## 2022-02-23 RX ADMIN — FENTANYL CITRATE 25 MCG: 50 INJECTION, SOLUTION INTRAMUSCULAR; INTRAVENOUS at 08:59

## 2022-02-23 RX ADMIN — SODIUM CHLORIDE: 9 INJECTION, SOLUTION INTRAVENOUS at 13:15

## 2022-02-23 RX ADMIN — NITROGLYCERIN 300 MCG: 20 INJECTION INTRAVENOUS at 09:41

## 2022-02-23 RX ADMIN — MIDAZOLAM 1 MG: 1 INJECTION INTRAMUSCULAR; INTRAVENOUS at 08:20

## 2022-02-23 RX ADMIN — ASPIRIN 81 MG: 81 TABLET, COATED ORAL at 07:29

## 2022-02-23 RX ADMIN — CLOPIDOGREL BISULFATE 150 MG: 75 TABLET ORAL at 11:25

## 2022-02-23 RX ADMIN — FENTANYL CITRATE 25 MCG: 50 INJECTION, SOLUTION INTRAMUSCULAR; INTRAVENOUS at 08:35

## 2022-02-23 RX ADMIN — HYDRALAZINE HYDROCHLORIDE 10 MG: 20 INJECTION INTRAMUSCULAR; INTRAVENOUS at 08:24

## 2022-02-23 RX ADMIN — ONDANSETRON 4 MG: 2 INJECTION INTRAMUSCULAR; INTRAVENOUS at 13:14

## 2022-02-23 NOTE — PROGRESS NOTES
Pt ambulated in room with RN, tolerated activity well. Pt up to bathroom and called RN in, urine is bloody and pt states she has never had this happen before. Message sent to Dr Britt Holman to notify him, awaiting return call.  Electronically signed by David Bella RN on 2/23/2022 at 2:46 PM

## 2022-02-23 NOTE — PROGRESS NOTES
DC instructions explained to pt and pt's daughter, both instructed to call vascular office if hematuria does not improve tomorrow and to hold plavix until further notice. Urology appt made. Pt and daughter both v/u and had no further questions. Pt ambulated with RN and R groin site cdi, no s/s of bleeding or hematoma. Pt's daughter plans to drive pt home.  Electronically signed by Tammie Rader RN on 2/23/2022 at 4:26 PM

## 2022-02-23 NOTE — OP NOTE
Preprocedure diagnosis:  1. Atherosclerosis of native arteries bilateral lower extremities with left lower extremity lifestyle limiting claudication  2. Diabetes mellitus type 2  3. Chronic back pain  4. Venous insufficiency bilateral lower extremities    Post procedure diagnosis: Same    Procedure:  1. Ultrasound-guided cannulation right common femoral artery with 5 Western Lizzie and later 6 Western Lizzie destination sheath  2. Suprarenal abdominal aortogram with bilateral iliofemoral arteriogram and bilateral lower extremity arteriogram  3. Left common/external iliac artery stent (express 8 mm x 37 mm uncovered balloon expandable stent)  4. Left iliofemoral arteriograms  5. Selective left lower extremity arteriogram  6. Atherectomy left superficial femoral artery with CSI 2.0 solid crown  7.  Balloon angioplasty left superficial femoral artery with 5 mm x 80 mm Alejandro balloon and 5 mm x 100 mm Lutonix drug-coated balloon  8. Left lower extremity arteriograms  9. Percutaneous embolectomy left tibial peroneal trunk  10. Completion left lower extremity arteriograms  11. Mynx closure right common femoral artery puncture site    Surgeon: Boris Bustillos MD    Anesthesia: Intravenous/moderate sedation plus local    Estimated blood loss: Less than 50 mL    Findings:  1. Renal arteries are fairly widely patent bilaterally. The infrarenal abdominal aorta is fairly widely patent. There is a 20 to 30% stenosis of the proximal right common iliac artery. The external iliac and internal iliac arteries are patent. There is a 70 to 80% stenosis of the distal left common iliac artery. The left internal iliac arteries patent at the origin but it appears to be occluded distal to this. The left external iliac artery is fairly widely patent. 2.  The right common femoral, profunda femoris, superficial femoral arteries are patent without any high-grade stenosis.   The right popliteal artery is obscured somewhat from her hardware after total knee replacement. However, there is brisk flow through this area. She appears to have three-vessel runoff on the right. 3.  The left common femoral and profunda femoris arteries are both fairly widely patent. There is some scattered plaque throughout the left superficial femoral artery with areas of 30 to 40% stenosis but then in the mid/distal superficial femoral artery there is a greater than 90% stenosis. The left popliteal artery is patent without any high-grade stenosis. She has small tibial arteries but she appears to have three-vessel runoff on this side. Procedure in detail:    After the patient was consented and given intravenous antibiotics, she was brought to angiography and placed on the table supine position. Intravenous/moderate sedation was administered both groins were prepped and draped in usual sterile procedure. Skin and subcutaneous tissues in the right groin were anesthetized lidocaine. Micropuncture needle was used to cannulate the right common femoral artery over the femoral head under ultrasound guidance and fluoroscopic visualization. Seldinger technique was utilized place a 5 Western Lizzie glide sheath. Over an 035 wire, an Omni Flush catheter was placed up into the suprarenal abdominal aorta. Suprarenal abdominal aortogram with bilateral iliofemoral arteriograms were performed with the above findings. The catheter was withdrawn to the distal abdominal aorta and distal abdominal aortogram with bilateral iliofemoral arteriograms were performed in both oblique views with the above findings. Finally, bilateral lower extremity arteriograms were performed. The patient was given 6000 and's of intravenous heparin which was redosed with 1000 units during the procedure. I exchanged the 5 Western Lizzie sheath for a 6 Western Lizzie [place] destination sheath.   The tip of this sheath was placed in the left common iliac artery and then utilizing roadmap assistance, and express 8 mm x 37 mm uncovered balloon expandable stent was placed across the stenosis in the mid/distal common iliac artery extending to the external iliac artery arteriograms after this show an excellent result with no flow-limiting dissection or extravasation of dye. The dilator was replaced and the sheath was now placed with the tip in the left common femoral artery selective left lower extremity arteriograms were performed with above findings. Utilizing roadmap assistance, I was able to pass an angled Glidewire utilizing an angled glide catheter through the very high-grade stenosis in the mid/distal superficial femoral artery. Since the artery appears to be calcified, I plan on performing orbital atherectomy with CSI. A Viper wire was placed into the popliteal artery under fluoroscopic visualization. Orbital atherectomy was then performed of the superficial femoral artery stenosis with very slow and deliberate passes. 1 pass was performed on low speed, 2 passes on medium speed, and at least 4-5 passes on high speed. Arteriograms showed good result with significant luminal gain. Balloon angioplasty was performed of the superficial femoral artery with a 5 mm x 80 mm Alejandro balloon and then a 5 mm x 100 mm Lutonix drug-coated balloon. Drug-coated balloon was left inflated for 3 minutes. Completion arteriograms here show an excellent result. There is no flow-limiting dissection nor residual stenosis. However, it does appear that we have some embolization to the tibial peroneal trunk. I was able to cross into the posterior tibial and later into the peroneal artery and an export 6 Placemeterra device was used to perform embolectomy of the distal tibial peroneal trunk, proximal peroneal and posterior tibial arteries. Patient was given intra-arterial nitroglycerin 3 times during the procedure. Completion arteriogram show some spasm but the anterior tibial artery is widely patent.   The tibial peroneal trunk is patent without any high-grade stenosis and the peroneal artery is patent without any high-grade stenosis. Posterior tibial artery flow is not excellent but I do think that this will resolve with some time. She does not have any left foot pain. Also, with her chest discomfort happening around this time, I decided to finish the procedure. Near the end of the procedure, the patient was complaining of either some abdominal discomfort high or chest pain. She could not really delineate which. We did perform an EKG which appears to be normal.  The discomforts that she was experiencing did resolve with time. The sheath was removed and the puncture site closed with a minx device. The patient tolerated the procedure well. She was brought back to cath holding in good condition.

## 2022-02-23 NOTE — PROGRESS NOTES
Call returned from Dr Josue Yoo in regards to pt having bloody urine. Telephone orders received from Dr Josue Yoo to have pt hold plavix, make an appt with Urology, and for pt to call office if bloody urine does not clear up in next day. Pt and pt's family member notified. Appt with Urology made.  Electronically signed by Jena Laurent RN on 2/23/2022 at 3:12 PM

## 2022-02-24 LAB
EKG P AXIS: 54 DEGREES
EKG P-R INTERVAL: 108 MS
EKG Q-T INTERVAL: 402 MS
EKG QRS DURATION: 96 MS
EKG QTC CALCULATION (BAZETT): 438 MS
EKG T AXIS: 61 DEGREES

## 2022-02-24 PROCEDURE — 93010 ELECTROCARDIOGRAM REPORT: CPT | Performed by: INTERNAL MEDICINE

## 2022-03-03 ENCOUNTER — OFFICE VISIT (OUTPATIENT)
Dept: UROLOGY | Age: 72
End: 2022-03-03
Payer: MEDICARE

## 2022-03-03 VITALS
SYSTOLIC BLOOD PRESSURE: 160 MMHG | BODY MASS INDEX: 32 KG/M2 | HEIGHT: 63 IN | TEMPERATURE: 97.2 F | WEIGHT: 180.6 LBS | DIASTOLIC BLOOD PRESSURE: 76 MMHG

## 2022-03-03 DIAGNOSIS — N95.2 ATROPHIC VAGINITIS: ICD-10-CM

## 2022-03-03 DIAGNOSIS — R31.0 GROSS HEMATURIA: Primary | ICD-10-CM

## 2022-03-03 LAB
APPEARANCE FLUID: CLEAR
BILIRUBIN, POC: NORMAL
BLOOD URINE, POC: NORMAL
CLARITY, POC: CLEAR
COLOR, POC: YELLOW
GLUCOSE URINE, POC: 250
KETONES, POC: NORMAL
LEUKOCYTE EST, POC: NORMAL
NITRITE, POC: NORMAL
PH, POC: 6
PROTEIN, POC: NORMAL
SPECIFIC GRAVITY, POC: 1.01
UROBILINOGEN, POC: 0.2

## 2022-03-03 PROCEDURE — 1123F ACP DISCUSS/DSCN MKR DOCD: CPT | Performed by: NURSE PRACTITIONER

## 2022-03-03 PROCEDURE — G8427 DOCREV CUR MEDS BY ELIG CLIN: HCPCS | Performed by: NURSE PRACTITIONER

## 2022-03-03 PROCEDURE — 1090F PRES/ABSN URINE INCON ASSESS: CPT | Performed by: NURSE PRACTITIONER

## 2022-03-03 PROCEDURE — 4004F PT TOBACCO SCREEN RCVD TLK: CPT | Performed by: NURSE PRACTITIONER

## 2022-03-03 PROCEDURE — 81002 URINALYSIS NONAUTO W/O SCOPE: CPT | Performed by: NURSE PRACTITIONER

## 2022-03-03 PROCEDURE — G8400 PT W/DXA NO RESULTS DOC: HCPCS | Performed by: NURSE PRACTITIONER

## 2022-03-03 PROCEDURE — G8484 FLU IMMUNIZE NO ADMIN: HCPCS | Performed by: NURSE PRACTITIONER

## 2022-03-03 PROCEDURE — 3017F COLORECTAL CA SCREEN DOC REV: CPT | Performed by: NURSE PRACTITIONER

## 2022-03-03 PROCEDURE — 99214 OFFICE O/P EST MOD 30 MIN: CPT | Performed by: NURSE PRACTITIONER

## 2022-03-03 PROCEDURE — G8417 CALC BMI ABV UP PARAM F/U: HCPCS | Performed by: NURSE PRACTITIONER

## 2022-03-03 PROCEDURE — 4040F PNEUMOC VAC/ADMIN/RCVD: CPT | Performed by: NURSE PRACTITIONER

## 2022-03-03 RX ORDER — CONJUGATED ESTROGENS 0.62 MG/G
CREAM VAGINAL
Qty: 30 G | Refills: 3 | Status: SHIPPED | OUTPATIENT
Start: 2022-03-03

## 2022-03-03 RX ORDER — DOXYCYCLINE HYCLATE 100 MG/1
100 CAPSULE ORAL 2 TIMES DAILY
COMMUNITY
End: 2022-05-25 | Stop reason: ALTCHOICE

## 2022-03-03 RX ORDER — DEXTROMETHORPHAN HYDROBROMIDE AND PROMETHAZINE HYDROCHLORIDE 15; 6.25 MG/5ML; MG/5ML
SYRUP ORAL 4 TIMES DAILY PRN
COMMUNITY

## 2022-03-03 NOTE — PROGRESS NOTES
Toshia Godoy is a 66 y.o., female, New patient who presents today   Chief Complaint   Patient presents with    New Patient     I am here for hematuria that happened after surgery.  Other     patient stated blood cleared up the next day after sx. HPI   Patient presents for evaluation of gross hematuria. She reports after a vascular procedure by Dr. Isaura Gudino for lower extremity claudication, she experienced an episode of blood in her urine during her hospital stay. She reports her nurse assisted her to the bathroom and when she urinated, she noted the blood in the toilet. She denies visualizing any clots. She denies any significant change in her urinary symptoms at that time. She reports Dr. Isaura Gudino was made aware of her hematuria and her Plavix was discontinued. She reports she has not had any issues with gross hematuria since that time in the hospital and she remains off of her Plavix now. Terms of chronic urinary symptoms, she reports nocturia 1-2 times per night, some mild urgency. She denies any prior history of nephrolithiasis, hematuria, urologic cancers in herself or her family. She reports having recurrent UTIs over the last several years. She is a current smoker and has at least a 50-pack-year history. 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 REVIEW OF SYSTEMS:  Review of Systems   Constitutional: Negative for chills and fever. Gastrointestinal: Negative for abdominal distention, abdominal pain, nausea and vomiting. Genitourinary: Positive for hematuria (Now resolved) and urgency. Negative for difficulty urinating, dysuria, flank pain and frequency. Musculoskeletal: Negative for back pain and gait problem. Psychiatric/Behavioral: Negative for agitation and confusion.      PHYSICAL EXAM:  BP (!) 160/76   Temp 97.2 °F (36.2 °C) (Temporal)   Ht 5' 3\" (1.6 m)   Wt 180 lb 9.6 oz (81.9 kg)   BMI 31.99 kg/m²   Physical Exam  Vitals and nursing note reviewed. Constitutional:       General: She is not in acute distress. Appearance: Normal appearance. She is not ill-appearing. Pulmonary:      Effort: Pulmonary effort is normal. No respiratory distress. Abdominal:      General: There is no distension. Tenderness: There is no abdominal tenderness. There is no right CVA tenderness or left CVA tenderness. Genitourinary:     Comments: No significant prolapse noted. Neurological:      Mental Status: She is alert and oriented to person, place, and time. Mental status is at baseline. Psychiatric:         Mood and Affect: Mood normal.         Behavior: Behavior normal.         DATA:  Results for orders placed or performed in visit on 03/03/22   POCT Urinalysis no Micro   Result Value Ref Range    Color, UA yellow     Clarity, UA clear     Glucose, UA      Bilirubin, UA neg     Ketones, UA neg     Spec Grav, UA 1.010     Blood, UA POC neg     pH, UA 6.0     Protein, UA POC neg     Urobilinogen, UA 0.2     Leukocytes, UA neg     Nitrite, UA neg     Appearance, Fluid Clear Clear, Slightly Cloudy       ASSESSMENT/PLAN  1. Gross hematuria  Hematuria is explained to the patient as blood in the urine, gross (visible to the naked eye) or microscopic. In many patients, the condition is benign and of no consequence. However, it may be related to an underlying cause including but not limited to malignancies or infections of the urinary system, renal mass, nephrolithiasis, or BPH. Evaluation options have been discussed and explained to patient. Taking into account her medical, family, and social history, this patient falls into a high category. We will proceed with cystoscopy and CT Urogram.  I did explain to the patient that her hematuria could likely be secondary to anticoagulation with her vascular procedure.   She voices understanding, but because of her age and smoking history, she wishes to pursue a full hematuria work-up just to rule out any other causes. - POCT Urinalysis no Micro  - CT ABDOMEN PELVIS W WO CONTRAST Additional Contrast? Radiologist Recommendation (Urogram Protocol); Future  - Cystoscopy; Future    2. Atrophic vaginitis  Pelvic exam did reveal some atrophic vaginitis. We will go ahead and initiate Premarin cream.  I explained to the patient if the medication was too expensive, we could send it to a compounding pharmacy. She would like this sent to [facility] on this outside if she is unable to afford the prescription  - conjugated estrogens (PREMARIN) 0.625 MG/GM vaginal cream; Apply 1 gm to vagina twice weekly  Dispense: 30 g; Refill: 3      Orders Placed This Encounter   Procedures    CT ABDOMEN PELVIS W WO CONTRAST Additional Contrast? Radiologist Recommendation (Urogram Protocol)     prior to next visit     Standing Status:   Future     Standing Expiration Date:   3/3/2023     Scheduling Instructions:      CT for Urogram protocol     Order Specific Question:   Additional Contrast?     Answer:   Radiologist Recommendation     Comments:   Urogram Protocol     Order Specific Question:   STAT Creatinine as needed:     Answer: Yes    POCT Urinalysis no Micro    Cystoscopy     Next available     Standing Status:   Future     Standing Expiration Date:   3/3/2023     Scheduling Instructions:      Next available        Return for with Dr. Tami Bolton, CT prior, cystoscopy, next available. An electronic signature was used to authenticate this note. STERLING SHAH, GALI - CNP    All information inputted into the note by the MA to include chief complaint, past medical history, past surgical history, medications, allergies, social and family history and review of systems has been reviewed and updated as needed by me. EMR Dragon/transcription disclaimer: Much of this document is electronic transcription/translation of spoken language to printed text.  The electronic translation of spoken language may be erroneous or, at times, nonsensical words or phrases may be inadvertently transcribed.  Although I have reviewed the document for such errors, some may still exist.

## 2022-03-04 ASSESSMENT — ENCOUNTER SYMPTOMS
VOMITING: 0
NAUSEA: 0
ABDOMINAL DISTENTION: 0
ABDOMINAL PAIN: 0
BACK PAIN: 0

## 2022-03-08 ENCOUNTER — OFFICE VISIT (OUTPATIENT)
Dept: VASCULAR SURGERY | Age: 72
End: 2022-03-08
Payer: MEDICARE

## 2022-03-08 VITALS
HEART RATE: 65 BPM | DIASTOLIC BLOOD PRESSURE: 78 MMHG | BODY MASS INDEX: 31.89 KG/M2 | WEIGHT: 180 LBS | TEMPERATURE: 97.3 F | HEIGHT: 63 IN | OXYGEN SATURATION: 99 % | SYSTOLIC BLOOD PRESSURE: 159 MMHG

## 2022-03-08 DIAGNOSIS — I70.213 ATHEROSCLER OF NATIVE ARTERY OF BOTH LEGS WITH INTERMIT CLAUDICATION (HCC): Primary | ICD-10-CM

## 2022-03-08 PROCEDURE — G8484 FLU IMMUNIZE NO ADMIN: HCPCS | Performed by: NURSE PRACTITIONER

## 2022-03-08 PROCEDURE — 3017F COLORECTAL CA SCREEN DOC REV: CPT | Performed by: NURSE PRACTITIONER

## 2022-03-08 PROCEDURE — 1123F ACP DISCUSS/DSCN MKR DOCD: CPT | Performed by: NURSE PRACTITIONER

## 2022-03-08 PROCEDURE — 99213 OFFICE O/P EST LOW 20 MIN: CPT | Performed by: NURSE PRACTITIONER

## 2022-03-08 PROCEDURE — G8427 DOCREV CUR MEDS BY ELIG CLIN: HCPCS | Performed by: NURSE PRACTITIONER

## 2022-03-08 PROCEDURE — G8400 PT W/DXA NO RESULTS DOC: HCPCS | Performed by: NURSE PRACTITIONER

## 2022-03-08 PROCEDURE — 4004F PT TOBACCO SCREEN RCVD TLK: CPT | Performed by: NURSE PRACTITIONER

## 2022-03-08 PROCEDURE — 4040F PNEUMOC VAC/ADMIN/RCVD: CPT | Performed by: NURSE PRACTITIONER

## 2022-03-08 PROCEDURE — G8417 CALC BMI ABV UP PARAM F/U: HCPCS | Performed by: NURSE PRACTITIONER

## 2022-03-08 PROCEDURE — 1090F PRES/ABSN URINE INCON ASSESS: CPT | Performed by: NURSE PRACTITIONER

## 2022-03-08 NOTE — PROGRESS NOTES
Ismael Yancey (:  1950) is a 66 y.o. female,Established patient, here for evaluation of the following chief complaint(s):  Post-Op Check (aortogram F/U)            SUBJECTIVE/OBJECTIVE:  Patient presents for follow up after an arteriogram with 1. Ultrasound-guided cannulation right common femoral artery with 5 [employer] [place] and later 6 [employer] [place] destination sheath  2. Suprarenal abdominal aortogram with bilateral iliofemoral arteriogram and bilateral lower extremity arteriogram  3. Left common/external iliac artery stent (express 8 mm x 37 mm uncovered balloon expandable stent)  4. Left iliofemoral arteriograms  5. Selective left lower extremity arteriogram  6. Atherectomy left superficial femoral artery with CSI 2.0 solid crown  7.  Balloon angioplasty left superficial femoral artery with 5 mm x 80 mm Alejandro balloon and 5 mm x 100 mm Lutonix drug-coated balloon  8. Left lower extremity arteriograms  9. Percutaneous embolectomy left tibial peroneal trunk  10. Completion left lower extremity arteriograms  11. Mynx closure right common femoral artery puncture site done 2 weeks ago. Procedure was done for peripheral vascular disease with claudication. Post op problems include none. Angiogram complications were none. Post op pain and swelling are minimal.  At present, she reports claudication at a distance of  Which is extended. Dae Paz reports that the left leg is more significant than the right. She reports claudication is improved and is mostly in the form of crampy type pain starting in the calves. She has a short recovery time. This is reproducible in nature. She reports ischemic rest pain 0 times per night. She reports walking with cart does not help.       Ismael Yancey is a 66 y.o. female with the following history as recorded in [facility]:  Patient Active Problem List    Diagnosis Date Noted    Atherosclerosis with claudication of extremity ([facility] [address] 2022    Chest pain     PVD (peripheral vascular disease) ([facility]     Osteoarthritis 02/03/2022    Failed back syndrome of lumbar spine 04/21/2021    Lumbar radiculopathy 04/21/2021    Duodenitis 01/02/2020    Acute pancreatitis 12/07/2018    Pancreatitis, recurrent 12/07/2018    Productive cough 10/26/2016    History of adenomatous polyp of colon 10/26/2016    Irritable bowel syndrome with constipation 11/04/2014    Narcotic dependence ([facility] 11/04/2014    Abdominal cramping 11/04/2014    Leg pain, bilateral 08/28/2014    Venous insufficiency of both lower extremities 08/28/2014    Varicose vein 06/24/2014    Chronic back pain 02/04/2013    HTN (hypertension) 02/04/2013    History of colon polyps 12/03/2012    Bloating 12/03/2012    Hemorrhoids 12/03/2012    Gastroesophageal reflux disease 06/20/2011    Constipation, chronic 06/20/2011    Colon polyps 06/20/2011     Current Outpatient Medications   Medication Sig Dispense Refill    metFORMIN (GLUCOPHAGE) 1000 MG tablet Take 1,000 mg by mouth 2 times daily (with meals)      doxycycline hyclate (VIBRAMYCIN) 100 MG capsule Take 100 mg by mouth 2 times daily      aspirin 81 MG EC tablet Take 81 mg by mouth daily      oxyCODONE 5 MG capsule Take 5 mg by mouth every 12 hours.       diclofenac (VOLTAREN) 75 MG EC tablet Take 1 tablet by mouth 2 times daily 1 tablet 0    fluticasone (FLONASE) 50 MCG/ACT nasal spray 2 sprays by Each Nostril route daily      doxazosin (CARDURA) 1 MG tablet Take 2 mg by mouth nightly      Cholecalciferol (VITAMIN D3) 1.25 MG (95925 UT) CAPS Take 1 capsule by mouth once a week      busPIRone (BUSPAR) 15 MG tablet Take 15 mg by mouth daily as needed      cloNIDine (CATAPRES) 0.1 MG tablet Take 0.1 mg by mouth daily as needed for High Blood Pressure      glimepiride (AMARYL) 2 MG tablet Take 2 mg by mouth daily as needed Indications: prn high blood sugar       bisoprolol (ZEBETA) 5 MG tablet Take 10 mg by mouth daily       atorvastatin (LIPITOR) 20 MG tablet Take 20 mg by mouth daily Managed by PCP      DULoxetine HCl (CYMBALTA PO) Take 60 mg by mouth daily.  promethazine-dextromethorphan (PROMETHAZINE-DM) 6.25-15 MG/5ML syrup Take by mouth 4 times daily as needed for Cough (Patient not taking: Reported on 3/8/2022)      conjugated estrogens (PREMARIN) 0.625 MG/GM vaginal cream Apply 1 gm to vagina twice weekly (Patient not taking: Reported on 3/8/2022) 30 g 3    clopidogrel (PLAVIX) 75 MG tablet Take 1 tablet by mouth daily (Patient not taking: Reported on 3/8/2022) 30 tablet 3    Latanoprost (XELPROS) 0.005 % EMUL Apply 1 drop to eye nightly each eye  (Patient not taking: Reported on 3/8/2022)      tiZANidine (ZANAFLEX) 4 MG tablet Take 4 mg by mouth every 8 hours as needed (Patient not taking: Reported on 3/8/2022)       No current facility-administered medications for this visit.      Allergies: Codeine, Amoxicillin-pot clavulanate, Best-c [ascorbate], Hctz, Hydrochlorothiazide, Irbesartan, Irbesartan-hydrochlorothiazide, Simvastatin, and Thiazide-type diuretics  Past Medical History:   Diagnosis Date    Carpal tunnel syndrome     Chronic back pain     Diabetes ([facility] [address]     DVT [facility]deep venous thrombosis) [facility]     History of blood transfusion     Hx of blood clots     DVT-POST OP  SECTION, DVT X 2 SINCE THEN    Hyperlipidemia     Hypertension     Osteoarthritis     Pancreatitis     Tendonitis     Unspecified sleep apnea     Pt does not sleep with C-pap machine    Varicose vein 2014     Past Surgical History:   Procedure Laterality Date    APPENDECTOMY      BACK SURGERY      x2    BACK SURGERY      stimulator    CARPAL TUNNEL RELEASE      right hand     SECTION      X 3    CHOLECYSTECTOMY      COLONOSCOPY  2008    COLONOSCOPY  10/25/2011    Dr Kela Ganser: tubular adenoma    CYST REMOVAL      GANGLION CYST REMOVAL    ERCP  2009    HYSTERECTOMY      TOTAL    JOINT REPLACEMENT Bilateral     Knee    KNEE ARTHROSCOPY      x2    LAMINECTOMY N/A 05/14/2021    THORACIC LAMINECTOMY FOR SPINAL CORD STIM WITH NEUROMONITORING performed by Lizz Aguero MD at [facility]      teeth pulled    NASAL SEPTUM SURGERY      NECK SURGERY      UPPER GASTROINTESTINAL ENDOSCOPY  03/07/2007    UPPER GASTROINTESTINAL ENDOSCOPY  07/05/2011    Dr Semaj lBank: gastritis, small hiatal hernia    VARICOSE VEIN SURGERY Right 08/28/2014    GSV RF Ablation    VEIN SURGERY  1967    ? left leg vein stripping     Family History   Problem Relation Age of Onset    Heart Disease Mother     Kidney Disease Mother     Cancer Father         liver cancer    Liver Cancer Father     High Blood Pressure Sister     High Blood Pressure Brother     High Cholesterol Brother     Heart Disease Brother     Colon Cancer Neg Hx     Colon Polyps Neg Hx     Esophageal Cancer Neg Hx     Liver Disease Neg Hx     Rectal Cancer Neg Hx     Stomach Cancer Neg Hx      Social History     Tobacco Use    Smoking status: Current Every Day Smoker     Packs/day: 1.00     Years: 40.00     Pack years: 40.00     Types: Cigarettes    Smokeless tobacco: Never Used   Substance Use Topics    Alcohol use: No       ROS  Eyes - no sudden vision change or amaurosis. Respiratory - no significant shortness of breath,  Cardiovascular - no chest pain or syncope. No  significant leg swelling. No claudication. Musculoskeletal - no gait disturbance  Skin - no new wound. Neurologic -  No speech difficulty or lateralizing weakness. All other review of systems are negative. Physical Exam    BP (!) 159/78   Pulse 65   Temp 97.3 °F (36.3 °C) (Temporal)   Ht 5' 3\" (1.6 m)   Wt 180 lb (81.6 kg)   SpO2 99%   BMI 31.89 kg/m²       Neck- carotid pulses 2+ to palpation with no bruit  Cardiovascular - Regular rate and rhythm. Pulmonary - effort appears normal.  No respiratory distress.     Lungs - Breath sounds normal. No wheezes or rales. Extremities -  Radial and brachial pulses are 2+ to palpation bilaterally. Right femoral pulse: present 2+; Right popliteal pulse: absent Right DP: absent; Right PT absent; Left femoral pulse: present 2+; Left popliteal pulse: absent; Left DP: absent; Left PT: absent No cyanosis, clubbing, or significant edema. No signs atheroembolic event. Groin without significant ecchymosis or pulsatile mass. Has some bruising. Has bilateral varicose veins  Neurologic - alert and oriented X 3. Physiologic. Face symmetric. Skin - warm, dry, and intact. No wound  Psychiatric - mood, affect, and behavior appear normal.  Judgment and thought processes appear normal.    Risk factors for atherosclerosis of all vascular beds have been reviewed with the patient including:  Family history, tobacco abuse in all forms, elevated cholesterol, hyperlipidemia, and diabetes. ASSESSMENT/PLAN:  1. Atheroscler of native artery of both legs with intermit claudication ([address]     1. Atheroscler of native artery of both legs with intermit claudication ([address]     stable and chronic     Discussed management of PVD which includes:  continue asa, plavix and lipitor to maintain patency of stents, to reduce risk of arterial thrombosis and to decrease rate of plaque buildup  Strongly encourage start/continue statin therapy   Recommend no smoking - discussed the effect tobacco has on illness;  Proceed with 3 months with shameka       Patient instructed to walk as much as possible. Call our office with any progressive pain in leg(s) or hip(s) with walking. Take good care of your feet. Let us know right away if you develop a wound on your foot. An electronic signature was used to authenticate this note.     --Mickeal Self, GALI

## 2022-03-16 LAB
ALBUMIN SERPL-MCNC: 4 G/DL (ref 3.5–5.2)
ALP BLD-CCNC: 93 U/L (ref 35–104)
ALT SERPL-CCNC: 16 U/L (ref 5–33)
ANION GAP SERPL CALCULATED.3IONS-SCNC: 14 MMOL/L (ref 7–19)
AST SERPL-CCNC: 13 U/L (ref 5–32)
BILIRUB SERPL-MCNC: <0.2 MG/DL (ref 0.2–1.2)
BUN BLDV-MCNC: 18 MG/DL (ref 8–23)
CALCIUM SERPL-MCNC: 9 MG/DL (ref 8.8–10.2)
CHLORIDE BLD-SCNC: 102 MMOL/L (ref 98–111)
CO2: 24 MMOL/L (ref 22–29)
CREAT SERPL-MCNC: 1.3 MG/DL (ref 0.5–0.9)
GFR AFRICAN AMERICAN: 48
GFR NON-AFRICAN AMERICAN: 40
GLUCOSE BLD-MCNC: 185 MG/DL (ref 74–109)
POTASSIUM SERPL-SCNC: 5.1 MMOL/L (ref 3.5–5)
SODIUM BLD-SCNC: 140 MMOL/L (ref 136–145)
TOTAL PROTEIN: 6 G/DL (ref 6.6–8.7)
VITAMIN D 25-HYDROXY: 59.9 NG/ML

## 2022-04-05 RX ORDER — CLOPIDOGREL BISULFATE 75 MG/1
TABLET ORAL
Qty: 90 TABLET | Refills: 1 | OUTPATIENT
Start: 2022-04-05

## 2022-04-12 ENCOUNTER — OFFICE VISIT (OUTPATIENT)
Dept: UROLOGY | Age: 72
End: 2022-04-12
Payer: MEDICARE

## 2022-04-12 VITALS
SYSTOLIC BLOOD PRESSURE: 150 MMHG | HEIGHT: 63 IN | TEMPERATURE: 97 F | WEIGHT: 179 LBS | HEART RATE: 101 BPM | BODY MASS INDEX: 31.71 KG/M2 | OXYGEN SATURATION: 100 % | DIASTOLIC BLOOD PRESSURE: 88 MMHG

## 2022-04-12 DIAGNOSIS — B37.9 YEAST INFECTION: ICD-10-CM

## 2022-04-12 DIAGNOSIS — R35.0 FREQUENCY OF URINATION: Primary | ICD-10-CM

## 2022-04-12 LAB
APPEARANCE FLUID: CLEAR
BILIRUBIN, POC: NORMAL
BLOOD URINE, POC: NORMAL
CLARITY, POC: CLEAR
COLOR, POC: YELLOW
GLUCOSE URINE, POC: NORMAL
KETONES, POC: NORMAL
LEUKOCYTE EST, POC: NORMAL
NITRITE, POC: NORMAL
PH, POC: 6
PROTEIN, POC: 30
SPECIFIC GRAVITY, POC: 1.01
UROBILINOGEN, POC: 0.2

## 2022-04-12 PROCEDURE — G8400 PT W/DXA NO RESULTS DOC: HCPCS | Performed by: NURSE PRACTITIONER

## 2022-04-12 PROCEDURE — 4004F PT TOBACCO SCREEN RCVD TLK: CPT | Performed by: NURSE PRACTITIONER

## 2022-04-12 PROCEDURE — G8417 CALC BMI ABV UP PARAM F/U: HCPCS | Performed by: NURSE PRACTITIONER

## 2022-04-12 PROCEDURE — 4040F PNEUMOC VAC/ADMIN/RCVD: CPT | Performed by: NURSE PRACTITIONER

## 2022-04-12 PROCEDURE — 81002 URINALYSIS NONAUTO W/O SCOPE: CPT | Performed by: NURSE PRACTITIONER

## 2022-04-12 PROCEDURE — 51798 US URINE CAPACITY MEASURE: CPT | Performed by: NURSE PRACTITIONER

## 2022-04-12 PROCEDURE — G8427 DOCREV CUR MEDS BY ELIG CLIN: HCPCS | Performed by: NURSE PRACTITIONER

## 2022-04-12 PROCEDURE — 1123F ACP DISCUSS/DSCN MKR DOCD: CPT | Performed by: NURSE PRACTITIONER

## 2022-04-12 PROCEDURE — 1090F PRES/ABSN URINE INCON ASSESS: CPT | Performed by: NURSE PRACTITIONER

## 2022-04-12 PROCEDURE — 99214 OFFICE O/P EST MOD 30 MIN: CPT | Performed by: NURSE PRACTITIONER

## 2022-04-12 PROCEDURE — 3017F COLORECTAL CA SCREEN DOC REV: CPT | Performed by: NURSE PRACTITIONER

## 2022-04-12 RX ORDER — FLUCONAZOLE 150 MG/1
150 TABLET ORAL
Qty: 2 TABLET | Refills: 0 | Status: SHIPPED | OUTPATIENT
Start: 2022-04-12 | End: 2022-04-18

## 2022-04-12 ASSESSMENT — ENCOUNTER SYMPTOMS
BACK PAIN: 0
ABDOMINAL DISTENTION: 0
ABDOMINAL PAIN: 1
NAUSEA: 0
VOMITING: 0

## 2022-04-12 NOTE — PROGRESS NOTES
Sylvain Moura is a 66 y.o., female, Established patient who presents today   Chief Complaint   Patient presents with    Follow-up     I am here today for a possible UTI. HPI   Patient previously seen in office on 3/3/2022 as a new patient for evaluation of gross hematuria after a vascular procedure for lower extremity claudication. She returns today with complaints of possible urinary tract infection versus yeast infection. She reports she has just completed antibiotic therapy for a sinus infection. She is experiencing some suprapubic pain as well as burning with urination. She also reports that she is having some burning without urination and just an uncomfortable feeling in the vaginal area. He denies any fevers, chills, nausea, vomiting. She denies any significant change in her urinary symptoms save for the vomiting. She has been utilizing topical estrogen cream from [facility] since our previous visit. REVIEW OF SYSTEMS:  Review of Systems   Constitutional: Negative for chills and fever. Gastrointestinal: Positive for abdominal pain (suprapubic). Negative for abdominal distention, nausea and vomiting. Genitourinary: Positive for dysuria and vaginal pain. Negative for difficulty urinating, flank pain, frequency, hematuria and urgency. Musculoskeletal: Negative for back pain and gait problem. Psychiatric/Behavioral: Negative for agitation and confusion. PHYSICAL EXAM:  BP (!) 150/88   Pulse 101   Temp 97 °F (36.1 °C)   Ht 5' 3\" (1.6 m)   Wt 179 lb (81.2 kg)   SpO2 100%   BMI 31.71 kg/m²   Physical Exam  Vitals and nursing note reviewed. Constitutional:       General: She is not in acute distress. Appearance: Normal appearance. She is not ill-appearing. Pulmonary:      Effort: Pulmonary effort is normal. No respiratory distress. Abdominal:      General: There is no distension. Tenderness: There is no abdominal tenderness.  There is no right CVA tenderness or left CVA tenderness. Neurological:      Mental Status: She is alert and oriented to person, place, and time. Mental status is at baseline. Psychiatric:         Mood and Affect: Mood normal.         Behavior: Behavior normal.         DATA:  Results for orders placed or performed in visit on 04/12/22   POCT Urinalysis no Micro   Result Value Ref Range    Color, UA yellow     Clarity, UA clear     Glucose, UA POC neg     Bilirubin, UA neg     Ketones, UA neg     Spec Grav, UA 1.010     Blood, UA POC neg     pH, UA 6.0     Protein, UA POC 30     Urobilinogen, UA 0.2     Leukocytes, UA trace     Nitrite, UA neg     Appearance, Fluid Clear Clear, Slightly Cloudy     Bladder Scan interpretation  Estimation of residual urine via abdominal ultrasound  Residual Urine: 32 ml  Indication: suprapubic pain  Position: Supine  Examination: Incremental scanning of the suprapubic area using 3 MHz transducer using copious amounts of acoustic gel. Findings: An anechoic area was demonstrated which represented the bladder, with measurement of residual urine as noted. ASSESSMENT/PLAN  1. Frequency of urination  Patient with complaints of possible urinary tract infection. We will send her urine for culture today and treat with antibiotics if necessary.  - AZ Measure, post-void residual, US, non-imaging  - POCT Urinalysis no Micro  - Culture, Urine    2. Yeast infection  Complaints resemble yeast infection. We will go ahead and send in treatment with Diflucan given her current symptoms. Again, sending urine culture as well. - fluconazole (DIFLUCAN) 150 MG tablet; Take 1 tablet by mouth every 72 hours for 6 days  Dispense: 2 tablet; Refill: 0      She will follow-up as scheduled for evaluation of gross hematuria. Orders Placed This Encounter   Procedures    Culture, Urine     Order Specific Question:   Specify (ex-cath, midstream, cysto, etc)?      Answer:   clean catch    POCT Urinalysis no Micro    AZ Measure, post-void residual, US, non-imaging     32ml        Return for keep fu as scheduled. An electronic signature was used to authenticate this note. GALI HARMON - CNP    All information inputted into the note by the MA to include chief complaint, past medical history, past surgical history, medications, allergies, social and family history and review of systems has been reviewed and updated as needed by me. EMR Dragon/transcription disclaimer: Much of this document is electronic transcription/translation of spoken language to printed text. The electronic translation of spoken language may be erroneous or, at times, nonsensical words or phrases may be inadvertently transcribed.  Although I have reviewed the document for such errors, some may still exist.

## 2022-04-15 DIAGNOSIS — R35.0 FREQUENCY OF URINATION: Primary | ICD-10-CM

## 2022-04-15 RX ORDER — NITROFURANTOIN 25; 75 MG/1; MG/1
100 CAPSULE ORAL 2 TIMES DAILY
Qty: 20 CAPSULE | Refills: 0 | Status: SHIPPED | OUTPATIENT
Start: 2022-04-15 | End: 2022-04-25

## 2022-04-17 LAB
ORGANISM: ABNORMAL
URINE CULTURE, ROUTINE: ABNORMAL

## 2022-04-19 DIAGNOSIS — R35.0 FREQUENCY OF URINATION: Primary | ICD-10-CM

## 2022-04-19 NOTE — PROGRESS NOTES
Called patient to discuss her culture results. Explained that she did have 3 bacteria in her urine and that her current antibiotic would likely be cover most of them. I offered her the choice to start another antibiotic for the third bacteria we will recheck her urine after the completion of her antibiotics. She has chosen to recheck her urine. She is afebrile and her symptoms are improving with antibiotic therapy. She will plan to leave a specimen on Wednesday the 27th and I will call her when those results are finalized, hopefully Friday the 29th.

## 2022-04-29 LAB — URINE CULTURE, ROUTINE: NORMAL

## 2022-05-18 ENCOUNTER — HOSPITAL ENCOUNTER (OUTPATIENT)
Dept: CT IMAGING | Age: 72
Discharge: HOME | End: 2022-05-18
Payer: MEDICARE

## 2022-05-18 DIAGNOSIS — R31.0 GROSS HEMATURIA: ICD-10-CM

## 2022-05-18 LAB
GFR AFRICAN AMERICAN: 41
GFR NON-AFRICAN AMERICAN: 34
PERFORMED ON: ABNORMAL
POC CREATININE: 1.5 MG/DL (ref 0.3–1.3)
POC SAMPLE TYPE: ABNORMAL

## 2022-05-18 PROCEDURE — 74150 CT ABDOMEN W/O CONTRAST: CPT

## 2022-05-18 PROCEDURE — 82565 ASSAY OF CREATININE: CPT

## 2022-05-25 ENCOUNTER — PROCEDURE VISIT (OUTPATIENT)
Dept: UROLOGY | Age: 72
End: 2022-05-25
Payer: MEDICARE

## 2022-05-25 VITALS — WEIGHT: 180 LBS | TEMPERATURE: 97.1 F | BODY MASS INDEX: 31.89 KG/M2 | HEIGHT: 63 IN

## 2022-05-25 DIAGNOSIS — R31.0 GROSS HEMATURIA: ICD-10-CM

## 2022-05-25 DIAGNOSIS — N95.2 ATROPHIC VAGINITIS: Primary | ICD-10-CM

## 2022-05-25 LAB
APPEARANCE FLUID: CLEAR
BILIRUBIN, POC: NORMAL
BLOOD URINE, POC: NORMAL
CLARITY, POC: CLEAR
COLOR, POC: YELLOW
GLUCOSE URINE, POC: 500
KETONES, POC: NORMAL
LEUKOCYTE EST, POC: NORMAL
NITRITE, POC: NORMAL
PH, POC: 5.5
PROTEIN, POC: 100
SPECIFIC GRAVITY, POC: 1.03
UROBILINOGEN, POC: 0.2

## 2022-05-25 PROCEDURE — 81002 URINALYSIS NONAUTO W/O SCOPE: CPT | Performed by: UROLOGY

## 2022-05-25 PROCEDURE — 52000 CYSTOURETHROSCOPY: CPT | Performed by: UROLOGY

## 2022-05-25 NOTE — PROGRESS NOTES
Here for evaluation and work-up for gross hematuria. He was seen by the nurse practitioner on 3/4/2022. She also has atrophic vaginitis and been started on Premarin cream.  She has some chronic dysuria. Her urinalysis today is negative for infection. She had a CT urogram.    Cystoscopy Procedure Note    Indications: Diagnosis    Pre-operative Diagnosis: Hematuria    Post-operative Diagnosis: Hematuria    Surgeon: Zora Santamaria MD    Assistants: staff    Anesthesia: Local anesthesia topical 2% lidocaine gel     Procedure Details   The risks, benefits, complications, treatment options, and expected outcomes were discussed with the patient. The patient concurred with the proposed plan, giving informed consent. Cystoscopy was performed today under local anesthesia, using sterile technique. The patient was placed in the lithotomy position, prepped with Hibiclens, and draped in the usual sterile fashion. A 19 Armenian sheath rigid cystoscope was used to inspect both the urethra and bladder using the 30 and 70 degree lenses. Findings:  Anterior urethra: normal without strictures and without scarring. Bladder: 1+ trabeculation, without lesions normal-appearing bladder mucosa. No papillary tumor seen no ulcerations or inflammatory changes seen. .  Ureteral orifice(s) was/were seen bilateral. Ureteral orifice(s) both sides were in the normal location and both ureteral orifices were effluxing clear urine. Normal cystoscopy                            Complications:  None; patient tolerated the procedure well. Disposition: To home after observation.            Condition: stable        DATA:  CMP:    Lab Results   Component Value Date     03/16/2022    K 5.1 03/16/2022    K 4.3 09/30/2020     03/16/2022    CO2 24 03/16/2022    BUN 18 03/16/2022    CREATININE 1.5 05/18/2022    CREATININE 1.3 03/16/2022    GFRAA 41 05/18/2022    LABGLOM 34 05/18/2022    GLUCOSE 185 03/16/2022    PROT 6.0 03/16/2022    LABALBU 4.0 03/16/2022    CALCIUM 9.0 03/16/2022    BILITOT <0.2 03/16/2022    ALKPHOS 93 03/16/2022    AST 13 03/16/2022    ALT 16 03/16/2022     Results for orders placed or performed in visit on 05/25/22   POCT Urinalysis no Micro   Result Value Ref Range    Color, UA yellow     Clarity, UA clear     Glucose, UA      Bilirubin, UA small     Ketones, UA trace     Spec Grav, UA 1.030     Blood, UA POC neg     pH, UA 5.5     Protein, UA      Urobilinogen, UA 0.2     Leukocytes, UA neg     Nitrite, UA neg     Appearance, Fluid Clear Clear, Slightly Cloudy               IMAGING:  CT urogram done 5/18/2022 reveals normal kidneys bilaterally prompt and symmetrical function and excretion of contrast without hydronephrosis without obstruction and without filling defects. No stones. No masses. 1. Gross hematuria  Resolved. CT urogram and cystoscopy are unremarkable. There is need to resume anticoagulation or antiplatelet therapy okay from  standpoint with close monitoring for recurrent hematuria. Otherwise no other recommendations or follow-up with me. - Cystoscopy  - POCT Urinalysis no Micro    2. Atrophic vaginitis  She has some local irritative symptoms probably related to atrophic vaginitis she has been started on Premarin cream.  We will have her follow-up with the nurse practitioner in 6 months      Orders Placed This Encounter   Procedures    POCT Urinalysis no Micro        Return in about 6 months (around 11/25/2022) for Follow-up with Cornell Flores next visit.

## 2022-07-12 ENCOUNTER — OFFICE VISIT (OUTPATIENT)
Dept: VASCULAR SURGERY | Age: 72
End: 2022-07-12
Payer: MEDICARE

## 2022-07-12 ENCOUNTER — HOSPITAL ENCOUNTER (OUTPATIENT)
Dept: VASCULAR LAB | Age: 72
Discharge: HOME | End: 2022-07-12
Payer: MEDICARE

## 2022-07-12 VITALS
DIASTOLIC BLOOD PRESSURE: 74 MMHG | HEART RATE: 72 BPM | TEMPERATURE: 97.1 F | SYSTOLIC BLOOD PRESSURE: 127 MMHG | OXYGEN SATURATION: 98 %

## 2022-07-12 DIAGNOSIS — I70.213 ATHEROSCLER OF NATIVE ARTERY OF BOTH LEGS WITH INTERMIT CLAUDICATION (HCC): ICD-10-CM

## 2022-07-12 DIAGNOSIS — I70.213 ATHEROSCLER OF NATIVE ARTERY OF BOTH LEGS WITH INTERMIT CLAUDICATION (HCC): Primary | ICD-10-CM

## 2022-07-12 PROCEDURE — 93923 UPR/LXTR ART STDY 3+ LVLS: CPT

## 2022-07-12 PROCEDURE — 1123F ACP DISCUSS/DSCN MKR DOCD: CPT | Performed by: NURSE PRACTITIONER

## 2022-07-12 PROCEDURE — G8417 CALC BMI ABV UP PARAM F/U: HCPCS | Performed by: NURSE PRACTITIONER

## 2022-07-12 PROCEDURE — 4004F PT TOBACCO SCREEN RCVD TLK: CPT | Performed by: NURSE PRACTITIONER

## 2022-07-12 PROCEDURE — 3017F COLORECTAL CA SCREEN DOC REV: CPT | Performed by: NURSE PRACTITIONER

## 2022-07-12 PROCEDURE — 1090F PRES/ABSN URINE INCON ASSESS: CPT | Performed by: NURSE PRACTITIONER

## 2022-07-12 PROCEDURE — 99214 OFFICE O/P EST MOD 30 MIN: CPT | Performed by: NURSE PRACTITIONER

## 2022-07-12 PROCEDURE — G8427 DOCREV CUR MEDS BY ELIG CLIN: HCPCS | Performed by: NURSE PRACTITIONER

## 2022-07-12 PROCEDURE — G8400 PT W/DXA NO RESULTS DOC: HCPCS | Performed by: NURSE PRACTITIONER

## 2022-07-12 NOTE — PROGRESS NOTES
Lex Gerard (:  1950) is a 74 y.o. female,Established patient, here for evaluation of the following chief complaint(s):  Follow-up (shameka)            SUBJECTIVE/OBJECTIVE:  Josie Hadley has a history of peripheral vascular disease of the lower extremities. She has had this for 1 - 5 years. Current treatment includes ASA EC daily. Josie Hadley has not had new wounds. Recently, she reports claudication at a distance of  Which is etended. Josie Hadley reports that the right leg is equal to the left. She reports claudication is not changed and is mostly in the form of crampy type pain starting in the calves. She has a short recovery time. This is reproducible in nature. She reports ischemic rest pain 0 times per night. She reports walking with cart does not help.       Lex Gerard is a 74 y.o. female with the following history as recorded in [facility]:  Patient Active Problem List    Diagnosis Date Noted    Atherosclerosis with claudication of extremity ([facility] 2022    Chest pain     PVD (peripheral vascular disease) ([facility]     Osteoarthritis 2022    Failed back syndrome of lumbar spine 2021    Lumbar radiculopathy 2021    Duodenitis 2020    Acute pancreatitis 2018    Pancreatitis, recurrent 2018    Productive cough 10/26/2016    History of adenomatous polyp of colon 10/26/2016    Irritable bowel syndrome with constipation 2014    Narcotic dependence ([facility] 2014    Abdominal cramping 2014    Leg pain, bilateral 2014    Venous insufficiency of both lower extremities 2014    Varicose vein 2014    Chronic back pain 2013    HTN (hypertension) 2013    History of colon polyps 2012    Bloating 2012    Hemorrhoids 2012    Gastroesophageal reflux disease 2011    Constipation, chronic 2011    Colon polyps 2011     Current Outpatient Medications   Medication Sig Dispense Refill    metFORMIN (GLUCOPHAGE) 1000 MG tablet Take 1,000 mg by mouth 2 times daily (with meals)      promethazine-dextromethorphan (PROMETHAZINE-DM) 6.25-15 MG/5ML syrup Take by mouth 4 times daily as needed for Cough       conjugated estrogens (PREMARIN) 0.625 MG/GM vaginal cream Apply 1 gm to vagina twice weekly 30 g 3    aspirin 81 MG EC tablet Take 81 mg by mouth daily      clopidogrel (PLAVIX) 75 MG tablet Take 1 tablet by mouth daily 30 tablet 3    oxyCODONE 5 MG capsule Take 5 mg by mouth every 12 hours.  diclofenac (VOLTAREN) 75 MG EC tablet Take 1 tablet by mouth 2 times daily 1 tablet 0    fluticasone (FLONASE) 50 MCG/ACT nasal spray 2 sprays by Each Nostril route daily      Latanoprost (XELPROS) 0.005 % EMUL Apply 1 drop to eye nightly each eye      doxazosin (CARDURA) 1 MG tablet Take 2 mg by mouth nightly      Cholecalciferol (VITAMIN D3) 1.25 MG (57897 UT) CAPS Take 1 capsule by mouth once a week      busPIRone (BUSPAR) 15 MG tablet Take 15 mg by mouth daily as needed      cloNIDine (CATAPRES) 0.1 MG tablet Take 0.1 mg by mouth daily as needed for High Blood Pressure      glimepiride (AMARYL) 2 MG tablet Take 2 mg by mouth daily as needed Indications: prn high blood sugar       bisoprolol (ZEBETA) 5 MG tablet Take 10 mg by mouth daily       atorvastatin (LIPITOR) 20 MG tablet Take 20 mg by mouth daily Managed by PCP      DULoxetine HCl (CYMBALTA PO) Take 60 mg by mouth daily. No current facility-administered medications for this visit.      Allergies: Codeine, Amoxicillin-pot clavulanate, Best-c [ascorbate], Fluticasone-salmeterol, Hctz, Hydrochlorothiazide, Irbesartan, Irbesartan-hydrochlorothiazide, Norvasc [amlodipine], Simvastatin, and Thiazide-type diuretics  Past Medical History:   Diagnosis Date    Carpal tunnel syndrome     Chronic back pain     Diabetes [facility]     DVT [facility]deep venous thrombosis) ([facility]     History of blood transfusion 2010    Hx of blood clots DVT-POST OP  SECTION, DVT X 2 SINCE THEN    Hyperlipidemia     Hypertension     Osteoarthritis     Pancreatitis     Tendonitis     Unspecified sleep apnea     Pt does not sleep with C-pap machine    Varicose vein 2014     Past Surgical History:   Procedure Laterality Date    APPENDECTOMY      BACK SURGERY      x2    BACK SURGERY      stimulator    CARPAL TUNNEL RELEASE      right hand     SECTION      X 3    CHOLECYSTECTOMY      COLONOSCOPY  2008    COLONOSCOPY  10/25/2011    Dr Talya Meza: tubular adenoma    CYST REMOVAL      GANGLION CYST REMOVAL    ERCP  2009    HYSTERECTOMY (CERVIX STATUS UNKNOWN)      TOTAL    JOINT REPLACEMENT Bilateral     Knee    KNEE ARTHROSCOPY      x2    LAMINECTOMY N/A 2021    THORACIC LAMINECTOMY FOR SPINAL CORD STIM WITH NEUROMONITORING performed by [facility]    NASAL SEPTUM SURGERY      NECK SURGERY      UPPER GASTROINTESTINAL ENDOSCOPY  2007    UPPER GASTROINTESTINAL ENDOSCOPY  2011    Dr Talya Meza: gastritis, small hiatal hernia    VARICOSE VEIN SURGERY Right 2014    GSV RF Ablation    VASCULAR SURGERY  2022    SJS. 1. Ultrasound-guided cannulation right common femoral artery with 5 Jordanian and later 6 Jordanian destination sheath. 2.Suprarenal abdominal aortogram with bilateral iliofemoral arteriogram and bilateral lower extremity arteriogram. 3.Left common/external iliac artery stent (express 8 mm x 37 mm uncovered balloon expandable stent) 4. Left iliofemoral arteriograms    VASCULAR SURGERY  2022    cont SJS. 5. Selective left lower extremity arteriogram 6. Atherectomy left superficial femoral artery with CSI 2.0 solid crown 7. Balloon angioplasty left superficial femoral artery with 5 mm x 80 mm gayla balloon and 5 mm x 100 mm lutonix drug coated balloon. 8. Left lower extremity arteriograms 9.  Percutaneous embolectomy left tibial peroneal trunk    VASCULAR SURGERY  02/23/2022    cont SJS. 10.Completion left lower extremity arteriograms. 11.Mynx closure right common femoral artery puncture site.  VASCULAR SURGERY  02/23/2022    SJS. 1. Ultrasound-guided cannulation right common femoral artery with 5 [place] and later 6 [place] destination sheath 2. Suprarenal abdominal aortofram with bilateral iliofemoral arteriogram and bilateral lower extremity arteriogram 3. Left common/external iliac artery stent (express 8mm x 37mm uncovered balloon expandable stent) 4. Left iliofemoral arteriograms    VASCULAR SURGERY  02/23/2022    CONT SJS. 5.Selective left lower extremity arteriogram 6. Atherectomy left superficial femoral artery with CSI 2.0 solid crown 7. Balloon angioplasty left superficial femoral artery with 5mm x 80mm gayla balloon and 5mm x 100mm Lutonix drug coated balloon 8. Left lower extremity arteriograms 9. Percutaneous embolectomy left tibial peroneal trunk 10. Completion left lower extremity arteriograms    VASCULAR SURGERY  02/23/2022    11. Mynx closure right common femoral artery puncture site. [address]    ? left leg vein stripping     Family History   Problem Relation Age of Onset    Heart Disease Mother     Kidney Disease Mother     Cancer Father         liver cancer    Liver Cancer Father     High Blood Pressure Sister     High Blood Pressure Brother     High Cholesterol Brother     Heart Disease Brother     Colon Cancer Neg Hx     Colon Polyps Neg Hx     Esophageal Cancer Neg Hx     Liver Disease Neg Hx     Rectal Cancer Neg Hx     Stomach Cancer Neg Hx      Social History     Tobacco Use    Smoking status: Current Every Day Smoker     Packs/day: 1.00     Years: 40.00     Pack years: 40.00     Types: Cigarettes    Smokeless tobacco: Never Used   Substance Use Topics    Alcohol use: No       ROS  Eyes - no sudden vision change or amaurosis.   Respiratory - no significant shortness of breath,  Cardiovascular - no syncope. Occasional pinpoint pain in chest.  Offered stress test.  She does not want at this time. No  significant leg swelling.  minimal claudication. Musculoskeletal - no gait disturbance  Skin - no new wound. Neurologic -  No speech difficulty or lateralizing weakness. All other review of systems are negative. Physical Exam    /74 (Site: Left Upper Arm, Position: Sitting, Cuff Size: Medium Adult)   Pulse 72   Temp 97.1 °F (36.2 °C)   SpO2 98%       Neck- carotid pulses 2+ to palpation with no bruit  Cardiovascular - Regular rate and rhythm. Pulmonary - effort appears normal.  No respiratory distress. Lungs - Breath sounds normal. No wheezes or rales. Extremities -  - Radial and brachial pulses are 2+ to palpation bilaterally. Right femoral pulse: present 2+; Right popliteal pulse: absent Right DP: present 2+; Right PT absent; Left femoral pulse: present 2+; Left popliteal pulse: absent; Left DP: absent; Left PT: absent No cyanosis, clubbing, or significant edema. No signs atheroembolic event. Neurologic - alert and oriented X 3. Physiologic. Face symmetric. Skin - warm, dry, and intact. no wound  Psychiatric - mood, affect, and behavior appear normal.  Judgment and thought processes appear normal.    Risk factors for atherosclerosis of all vascular beds have been reviewed with the patient including:  Family history, tobacco abuse in all forms, elevated cholesterol, hyperlipidemia, and diabetes. Lower extremity arterial study: Right LAZ 1.01, Left LAZ 0.75. Individual films reviewed: Yes. These results were reviewed with the patient. Disease process is stable and chronic  by review of waveforms, I see no significant change      Reviewed on this visit: pcp notes    Reviewed previous studies including: shameka  Individual images were reviewed. I agree with the findings  Results were discussed with the patient. ASSESSMENT/PLAN:  1.  Atheroscler of native artery of both legs with intermit claudication (HCC)  -     VL LOWER EXTREMITY ARTERIAL SEGMENTAL PRESSURES W PPG; Future    1. Atheroscler of native artery of both legs with intermit claudication ([address]     stable and chronic    Discussed management of shameka which includes:  continue asa, plavix and lipitor to maintain patency of stents, to reduce risk of arterial thrombosis and to decrease rate of plaque buildup  Strongly encourage start/continue statin therapy -   Recommend no smoking - discussed the effect tobacco has on illness;   Proceed with 3 months with shameka          Patient instructed to walk as much as possible. Call our office with any progressive pain in leg(s) or hip(s) with walking. Take good care of your feet. Let us know right away if you develop a wound on your foot. An electronic signature was used to authenticate this note.     --GALI Wilkins

## 2022-07-13 DIAGNOSIS — I73.9 PVD (PERIPHERAL VASCULAR DISEASE) (HCC): Primary | ICD-10-CM

## 2022-07-13 RX ORDER — CLOPIDOGREL BISULFATE 75 MG/1
75 TABLET ORAL DAILY
Qty: 30 TABLET | Refills: 3 | Status: SHIPPED | OUTPATIENT
Start: 2022-07-13 | End: 2022-10-11

## 2022-07-13 NOTE — TELEPHONE ENCOUNTER
Patient called to say that she failed to let someone know she needed a refill on her Plavix when she was in the office yesterday. I told her we would get it refilled. Patient acknowledged.

## 2022-08-04 ENCOUNTER — TELEPHONE (OUTPATIENT)
Dept: VASCULAR SURGERY | Age: 72
End: 2022-08-04

## 2022-08-04 NOTE — TELEPHONE ENCOUNTER
The pt called to see why she was scheduled for 08/12. Pt was just seen on 07/12. Please contact the pt.

## 2022-08-04 NOTE — TELEPHONE ENCOUNTER
Returned pt's call. She is aware that her Aug Study and F/U appt has been cancelled and will be rescheduled for MINISTRY [facility]).   She needed a 3 month f/u not a 1 month f/u.   Elizabeth Meth 8/4/22

## 2022-09-14 ENCOUNTER — TELEPHONE (OUTPATIENT)
Dept: VASCULAR SURGERY | Age: 72
End: 2022-09-14

## 2022-09-14 NOTE — TELEPHONE ENCOUNTER
I left a voicemail to let her know her us look ok and if she wanted to do a phone visit with Linh Alexander so she can go over everything with her to call us back so we can set that up for her.

## 2022-09-27 ENCOUNTER — TRANSCRIBE ORDERS (OUTPATIENT)
Dept: ADMINISTRATIVE | Facility: HOSPITAL | Age: 72
End: 2022-09-27

## 2022-09-27 ENCOUNTER — HOSPITAL ENCOUNTER (OUTPATIENT)
Dept: GENERAL RADIOLOGY | Facility: HOSPITAL | Age: 72
Discharge: HOME | End: 2022-09-27

## 2022-09-27 DIAGNOSIS — M19.011 LOCALIZED OSTEOARTHRITIS OF RIGHT SHOULDER: Primary | ICD-10-CM

## 2022-09-27 DIAGNOSIS — M25.552 PAIN IN LEFT HIP: Primary | ICD-10-CM

## 2022-09-27 DIAGNOSIS — M25.569 KNEE PAIN, UNSPECIFIED CHRONICITY, UNSPECIFIED LATERALITY: ICD-10-CM

## 2022-09-27 DIAGNOSIS — M54.6 PAIN IN THORACIC SPINE: ICD-10-CM

## 2022-09-27 DIAGNOSIS — R07.81 PLEURODYNIA: ICD-10-CM

## 2022-09-27 PROCEDURE — 72070 X-RAY EXAM THORAC SPINE 2VWS: CPT

## 2022-09-27 PROCEDURE — 73562 X-RAY EXAM OF KNEE 3: CPT

## 2022-09-27 PROCEDURE — 72100 X-RAY EXAM L-S SPINE 2/3 VWS: CPT

## 2022-09-27 PROCEDURE — 73030 X-RAY EXAM OF SHOULDER: CPT

## 2022-09-27 PROCEDURE — 71110 X-RAY EXAM RIBS BIL 3 VIEWS: CPT

## 2022-09-27 PROCEDURE — 73502 X-RAY EXAM HIP UNI 2-3 VIEWS: CPT

## 2022-10-10 DIAGNOSIS — I73.9 PVD (PERIPHERAL VASCULAR DISEASE) (HCC): ICD-10-CM

## 2022-10-11 RX ORDER — CLOPIDOGREL BISULFATE 75 MG/1
TABLET ORAL
Qty: 90 TABLET | Refills: 1 | Status: SHIPPED | OUTPATIENT
Start: 2022-10-11

## 2022-10-18 ENCOUNTER — HOSPITAL ENCOUNTER (OUTPATIENT)
Dept: VASCULAR LAB | Age: 72
Discharge: HOME | End: 2022-10-18
Payer: MEDICARE

## 2022-10-18 ENCOUNTER — OFFICE VISIT (OUTPATIENT)
Dept: VASCULAR SURGERY | Age: 72
End: 2022-10-18
Payer: MEDICARE

## 2022-10-18 VITALS
SYSTOLIC BLOOD PRESSURE: 160 MMHG | DIASTOLIC BLOOD PRESSURE: 80 MMHG | TEMPERATURE: 98 F | RESPIRATION RATE: 18 BRPM | HEART RATE: 80 BPM

## 2022-10-18 DIAGNOSIS — I70.213 ATHEROSCLER OF NATIVE ARTERY OF BOTH LEGS WITH INTERMIT CLAUDICATION (HCC): ICD-10-CM

## 2022-10-18 DIAGNOSIS — I73.9 PVD (PERIPHERAL VASCULAR DISEASE) (HCC): Primary | ICD-10-CM

## 2022-10-18 PROCEDURE — G8400 PT W/DXA NO RESULTS DOC: HCPCS | Performed by: PHYSICIAN ASSISTANT

## 2022-10-18 PROCEDURE — 93923 UPR/LXTR ART STDY 3+ LVLS: CPT

## 2022-10-18 PROCEDURE — G8427 DOCREV CUR MEDS BY ELIG CLIN: HCPCS | Performed by: PHYSICIAN ASSISTANT

## 2022-10-18 PROCEDURE — G8484 FLU IMMUNIZE NO ADMIN: HCPCS | Performed by: PHYSICIAN ASSISTANT

## 2022-10-18 PROCEDURE — 4004F PT TOBACCO SCREEN RCVD TLK: CPT | Performed by: PHYSICIAN ASSISTANT

## 2022-10-18 PROCEDURE — 1090F PRES/ABSN URINE INCON ASSESS: CPT | Performed by: PHYSICIAN ASSISTANT

## 2022-10-18 PROCEDURE — 99203 OFFICE O/P NEW LOW 30 MIN: CPT | Performed by: PHYSICIAN ASSISTANT

## 2022-10-18 PROCEDURE — 3017F COLORECTAL CA SCREEN DOC REV: CPT | Performed by: PHYSICIAN ASSISTANT

## 2022-10-18 PROCEDURE — 1123F ACP DISCUSS/DSCN MKR DOCD: CPT | Performed by: PHYSICIAN ASSISTANT

## 2022-10-18 PROCEDURE — G8417 CALC BMI ABV UP PARAM F/U: HCPCS | Performed by: PHYSICIAN ASSISTANT

## 2022-10-18 NOTE — PROGRESS NOTES
Patient Care Team:  Edwin Buck MD as PCP - General (Family Medicine)  Edwin Buck MD as PCP - REHABILITATION [facility] Empaneled Provider  Cheryln Hammans, MD (Anesthesiology)  GALI Toledo as Nurse Practitioner (Family Nurse Practitioner)  Nelson Erwin MD as Consulting Physician (Neurosurgery)      History and Physical  Mrs. Albino Deluca is a 78-year-old female has a past medical history that includes chronic back pain diabetes, DVT, hyperlipidemia, hypertension, tobacco abuse and PVD. Today we are following up for her PVD. Currently she is on aspirin 81 mg, Plavix 75 mg and atorvastatin 20 mg daily. She denies any lifestyle limiting claudication, ischemic rest pain or nonhealing wounds on her lower extremities. She reports bilateral lower extremity leg swelling.      Jose Maurer is a 74 y.o. female with the following history reviewed and recorded in [facility]:  Patient Active Problem List    Diagnosis Date Noted    Atherosclerosis with claudication of extremity ([facility] 02/23/2022    Chest pain     PVD (peripheral vascular disease) ([facility]     Osteoarthritis 02/03/2022    Failed back syndrome of lumbar spine 04/21/2021    Lumbar radiculopathy 04/21/2021    Duodenitis 01/02/2020    Acute pancreatitis 12/07/2018    Pancreatitis, recurrent 12/07/2018    Productive cough 10/26/2016    History of adenomatous polyp of colon 10/26/2016    Irritable bowel syndrome with constipation 11/04/2014    Narcotic dependence ([facility] 11/04/2014    Abdominal cramping 11/04/2014    Leg pain, bilateral 08/28/2014    Venous insufficiency of both lower extremities 08/28/2014     replace inactive diagnosis      Varicose vein 06/24/2014    Chronic back pain 02/04/2013    HTN (hypertension) 02/04/2013    History of colon polyps 12/03/2012    Bloating 12/03/2012    Hemorrhoids 12/03/2012    Gastroesophageal reflux disease 06/20/2011    Constipation, chronic 06/20/2011    Colon polyps 06/20/2011     Current Outpatient Medications Medication Sig Dispense Refill    clopidogrel (PLAVIX) 75 MG tablet TAKE 1 TABLET BY MOUTH EVERY DAY 90 tablet 1    metFORMIN (GLUCOPHAGE) 1000 MG tablet Take 1,000 mg by mouth 2 times daily (with meals)      promethazine-dextromethorphan (PROMETHAZINE-DM) 6.25-15 MG/5ML syrup Take by mouth 4 times daily as needed for Cough       conjugated estrogens (PREMARIN) 0.625 MG/GM vaginal cream Apply 1 gm to vagina twice weekly 30 g 3    aspirin 81 MG EC tablet Take 81 mg by mouth daily      clopidogrel (PLAVIX) 75 MG tablet Take 1 tablet by mouth daily 30 tablet 3    oxyCODONE 5 MG capsule Take 5 mg by mouth every 12 hours. diclofenac (VOLTAREN) 75 MG EC tablet Take 1 tablet by mouth 2 times daily 1 tablet 0    fluticasone (FLONASE) 50 MCG/ACT nasal spray 2 sprays by Each Nostril route daily      Latanoprost (XELPROS) 0.005 % EMUL Apply 1 drop to eye nightly each eye      doxazosin (CARDURA) 1 MG tablet Take 2 mg by mouth nightly      Cholecalciferol (VITAMIN D3) 1.25 MG (63954 UT) CAPS Take 1 capsule by mouth once a week      busPIRone (BUSPAR) 15 MG tablet Take 15 mg by mouth daily as needed      cloNIDine (CATAPRES) 0.1 MG tablet Take 0.1 mg by mouth daily as needed for High Blood Pressure      glimepiride (AMARYL) 2 MG tablet Take 2 mg by mouth daily as needed Indications: prn high blood sugar       bisoprolol (ZEBETA) 5 MG tablet Take 10 mg by mouth daily       atorvastatin (LIPITOR) 20 MG tablet Take 20 mg by mouth daily Managed by PCP      DULoxetine HCl (CYMBALTA PO) Take 60 mg by mouth daily. No current facility-administered medications for this visit.      Allergies: Codeine, Amoxicillin-pot clavulanate, Best-c [ascorbate], Fluticasone-salmeterol, Hctz, Hydrochlorothiazide, Irbesartan, Irbesartan-hydrochlorothiazide, Norvasc [amlodipine], Simvastatin, and Thiazide-type diuretics  Past Medical History:   Diagnosis Date    Carpal tunnel syndrome     Chronic back pain     Diabetes (HCC)     DVT (deep venous thrombosis) ([facility]     History of blood transfusion     Hx of blood clots     DVT-POST OP  SECTION, DVT X 2 SINCE THEN    Hyperlipidemia     Hypertension     Osteoarthritis     Pancreatitis     Tendonitis     Unspecified sleep apnea     Pt does not sleep with C-pap machine    Varicose vein 2014     Past Surgical History:   Procedure Laterality Date    APPENDECTOMY      BACK SURGERY      x2    BACK SURGERY      stimulator    CARPAL TUNNEL RELEASE      right hand     SECTION      X 3    CHOLECYSTECTOMY      COLONOSCOPY  2008    COLONOSCOPY  10/25/2011    Dr Shelley López: tubular adenoma    CYST REMOVAL      GANGLION CYST REMOVAL    ERCP  2009    HYSTERECTOMY (CERVIX STATUS UNKNOWN)      TOTAL    JOINT REPLACEMENT Bilateral     Knee    KNEE ARTHROSCOPY      x2    LAMINECTOMY N/A 2021    THORACIC LAMINECTOMY FOR SPINAL CORD STIM WITH NEUROMONITORING performed by Nelson Erwin MD at [facility]      teeth pulled    [address] [facility] ENDOSCOPY  2007    UPPER GASTROINTESTINAL ENDOSCOPY  2011    Dr Shelley López: gastritis, small hiatal hernia    VARICOSE VEIN SURGERY Right 2014    GSV RF Ablation    VASCULAR SURGERY  2022    SJS. 1. Ultrasound-guided cannulation right common femoral artery with 5 Grenadian and later 6 Grenadian destination sheath. 2.Suprarenal abdominal aortogram with bilateral iliofemoral arteriogram and bilateral lower extremity arteriogram. 3.Left common/external iliac artery stent (express 8 mm x 37 mm uncovered balloon expandable stent) 4. Left iliofemoral arteriograms    VASCULAR SURGERY  2022    cont SJS. 5. Selective left lower extremity arteriogram 6. Atherectomy left superficial femoral artery with CSI 2.0 solid crown 7. Balloon angioplasty left superficial femoral artery with 5 mm x 80 mm gayla balloon and 5 mm x 100 mm lutonix drug coated balloon.  8. Left lower extremity arteriograms 9. Percutaneous embolectomy left tibial peroneal trunk    VASCULAR SURGERY  02/23/2022    cont SJS. 10.Completion left lower extremity arteriograms. 11.Mynx closure right common femoral artery puncture site. VASCULAR SURGERY  02/23/2022    SJS. 1. Ultrasound-guided cannulation right common femoral artery with 5 [place] and later 6 [place] destination sheath 2. Suprarenal abdominal aortofram with bilateral iliofemoral arteriogram and bilateral lower extremity arteriogram 3. Left common/external iliac artery stent (express 8mm x 37mm uncovered balloon expandable stent) 4. Left iliofemoral arteriograms    VASCULAR SURGERY  02/23/2022    CONT SJS. 5.Selective left lower extremity arteriogram 6. Atherectomy left superficial femoral artery with CSI 2.0 solid crown 7. Balloon angioplasty left superficial femoral artery with 5mm x 80mm gayla balloon and 5mm x 100mm Lutonix drug coated balloon 8. Left lower extremity arteriograms 9. Percutaneous embolectomy left tibial peroneal trunk 10. Completion left lower extremity arteriograms    VASCULAR SURGERY  02/23/2022    11. Mynx closure right common femoral artery puncture site. [facility]    ?  left leg vein stripping     Family History   Problem Relation Age of Onset    Heart Disease Mother     Kidney Disease Mother     Cancer Father         liver cancer    Liver Cancer Father     High Blood Pressure Sister     High Blood Pressure Brother     High Cholesterol Brother     Heart Disease Brother     Colon Cancer Neg Hx     Colon Polyps Neg Hx     Esophageal Cancer Neg Hx     Liver Disease Neg Hx     Rectal Cancer Neg Hx     Stomach Cancer Neg Hx      Social History     Tobacco Use    Smoking status: Every Day     Packs/day: 1.00     Years: 40.00     Pack years: 40.00     Types: Cigarettes    Smokeless tobacco: Never   Substance Use Topics    Alcohol use: No       Review of Systems    Constitutional - no significant activity change, appetite change, or unexpected weight change. No fever or chills. No diaphoresis or significant fatigue. HENT - no significant rhinorrhea or epistaxis. No tinnitus or significant hearing loss. Eyes - no sudden vision change or amaurosis. Respiratory - no significant shortness of breath, wheezing, or stridor. No apnea, cough, or chest tightness associated with shortness of breath. Cardiovascular - no chest pain, syncope, or significant dizziness. No palpitations or significant leg swelling. (see HPI). Gastrointestinal - no abdominal swelling or pain. No blood in stool. No severe constipation, diarrhea, nausea, or vomiting. Genitourinary - No difficulty urinating, dysuria, frequency, or urgency. No flank pain or hematuria. Musculoskeletal - no back pain, gait disturbance, or myalgia. Skin - no color change, rash, pallor, or new wound. Neurologic - no dizziness, facial asymmetry, or light headedness. No seizures. No speech difficulty or lateralizing weakness. Hematologic - no easy bruising or excessive bleeding. Psychiatric - no severe anxiety or nervousness. No confusion. All other review of systems are negative. Physical Exam    BP (!) 160/80 (Site: Left Upper Arm, Position: Sitting, Cuff Size: Medium Adult)   Pulse 80   Temp 98 °F (36.7 °C) (Oral)   Resp 18     Constitutional - well developed, well nourished. No diaphoresis or acute distress. HENT - head normocephalic. Right external ear canal appears normal.  Left external ear canal appears normal.  Septum appears midline. Eyes - conjunctiva normal.  EOMS normal.  No exudate. No icterus. Neck- ROM appears normal, no tracheal deviation. Cardiovascular - Regular rate and rhythm. Heart sounds are normal.  No murmur, rub, or gallop. Carotid pulses are 2+ to palpation bilaterally without bruit. Extremities - Radial and ulnar pulses are 2+ to palpation bilaterally. DP and right PT pulses are palpable.   She has spider varicosities noted bilateral lower extremities. She has bilateral lower extremity leg swelling left greater than the right. No cyanosis, clubbing. No signs atheroembolic event. Pulmonary - effort appears normal.  No respiratory distress. Lungs - Breath sounds normal. No wheezes or rales. GI - Abdomen - soft, non tender, bowel sounds X 4 quadrants. No guarding or rebound tenderness. No distension or palpable mass. Genitourinary - deferred. Musculoskeletal - ROM appears normal.  No significant edema. Neurologic - alert and oriented X 3. Physiologic. Skin - warm, dry, and intact. No rash, erythema, or pallor. Psychiatric - mood, affect, and behavior appear normal.  Judgment and thought processes appear normal.    Risk factors for atherosclerosis of all vascular beds have been reviewed with the patient including:  Family history, tobacco abuse in all forms, elevated cholesterol, hyperlipidemia, and diabetes. Lower extremity arterial study:   Right LAZ 1.14, Left LAZ 0.90. Individual films reviewed: Yes. Test results were reviewed and compared to previous study with significant improvement noted left leg. Results were reviewed with the patient. Assessment    1.  PVD (peripheral vascular disease) ([address]        Plan      Recommend she continue ASA 81 mg, Plavix 75 mg and Lipitor 20 mg daily   Good skin care/checks daily  Recommend good blood pressure and glycemic control  Recommend no smoking  Recommend daily exercise in moderation  We will follow-up in 6 months with a repeat lower extremity arterial study (with Dominique) or sooner if claudication worsens, patient develops wound or IRP

## 2022-12-14 ENCOUNTER — TRANSCRIBE ORDERS (OUTPATIENT)
Dept: ADMINISTRATIVE | Facility: HOSPITAL | Age: 72
End: 2022-12-14

## 2022-12-14 ENCOUNTER — HOSPITAL ENCOUNTER (OUTPATIENT)
Dept: GENERAL RADIOLOGY | Facility: HOSPITAL | Age: 72
Discharge: HOME | End: 2022-12-14
Admitting: NURSE PRACTITIONER

## 2022-12-14 DIAGNOSIS — M25.552 PAIN IN LEFT HIP: Primary | ICD-10-CM

## 2022-12-14 PROCEDURE — 73502 X-RAY EXAM HIP UNI 2-3 VIEWS: CPT

## 2023-01-17 ENCOUNTER — TELEPHONE (OUTPATIENT)
Dept: VASCULAR SURGERY | Age: 73
End: 2023-01-17

## 2023-01-17 NOTE — TELEPHONE ENCOUNTER
Patient called today to ask if she could stop her Plavix for a week prior to a procedure. I called the patient back and per DAVIN [facility] the patient can stop the Plavix but not without risk since it's been less than 1 year since her procedure. I left our phone number should she have other questions.

## 2023-02-22 ENCOUNTER — TELEPHONE (OUTPATIENT)
Dept: GASTROENTEROLOGY | Age: 73
End: 2023-02-22

## 2023-02-22 NOTE — TELEPHONE ENCOUNTER
Patient: Marlene Schofield    YOB: 1950      Clearance was received on February 22, 2023. for Endoscopy / Colonoscopy scheduled for: 3/30/23    Patient may discontinue the use of PLAVIX for 5  days prior to the procedure.  CONTINUE TAKING ASA    IS Lovenox required:  NO    PATIENT NOTIFIED ON:  2/22/23 - PATIENT VOICED UNDERSTANDING       Clearance scanned into media

## 2023-03-24 ENCOUNTER — TELEPHONE (OUTPATIENT)
Dept: GASTROENTEROLOGY | Age: 73
End: 2023-03-24

## 2023-03-29 ENCOUNTER — HOSPITAL ENCOUNTER (OUTPATIENT)
Dept: ULTRASOUND IMAGING | Age: 73
Discharge: HOME | End: 2023-03-29
Payer: MEDICARE

## 2023-03-29 DIAGNOSIS — I10 ESSENTIAL (PRIMARY) HYPERTENSION: ICD-10-CM

## 2023-03-29 PROCEDURE — 93975 VASCULAR STUDY: CPT | Performed by: RADIOLOGY

## 2023-03-29 PROCEDURE — 93975 VASCULAR STUDY: CPT

## 2023-04-16 DIAGNOSIS — I73.9 PVD (PERIPHERAL VASCULAR DISEASE) (HCC): ICD-10-CM

## 2023-04-17 RX ORDER — CLOPIDOGREL BISULFATE 75 MG/1
TABLET ORAL
Qty: 90 TABLET | Refills: 1 | Status: SHIPPED | OUTPATIENT
Start: 2023-04-17

## 2023-04-19 ENCOUNTER — OFFICE VISIT (OUTPATIENT)
Dept: VASCULAR SURGERY | Age: 73
End: 2023-04-19
Payer: MEDICARE

## 2023-04-19 ENCOUNTER — HOSPITAL ENCOUNTER (OUTPATIENT)
Dept: VASCULAR LAB | Age: 73
Discharge: HOME | End: 2023-04-19
Payer: MEDICARE

## 2023-04-19 VITALS
SYSTOLIC BLOOD PRESSURE: 144 MMHG | HEART RATE: 86 BPM | TEMPERATURE: 96.4 F | DIASTOLIC BLOOD PRESSURE: 80 MMHG | OXYGEN SATURATION: 90 %

## 2023-04-19 DIAGNOSIS — I70.213 ATHEROSCLER OF NATIVE ARTERY OF BOTH LEGS WITH INTERMIT CLAUDICATION (HCC): Primary | ICD-10-CM

## 2023-04-19 DIAGNOSIS — I73.9 PVD (PERIPHERAL VASCULAR DISEASE) (HCC): ICD-10-CM

## 2023-04-19 DIAGNOSIS — I70.213 ATHEROSCLER OF NATIVE ARTERY OF BOTH LEGS WITH INTERMIT CLAUDICATION (HCC): ICD-10-CM

## 2023-04-19 PROCEDURE — 1123F ACP DISCUSS/DSCN MKR DOCD: CPT | Performed by: NURSE PRACTITIONER

## 2023-04-19 PROCEDURE — 99214 OFFICE O/P EST MOD 30 MIN: CPT | Performed by: NURSE PRACTITIONER

## 2023-04-19 PROCEDURE — G8427 DOCREV CUR MEDS BY ELIG CLIN: HCPCS | Performed by: NURSE PRACTITIONER

## 2023-04-19 PROCEDURE — 3074F SYST BP LT 130 MM HG: CPT | Performed by: NURSE PRACTITIONER

## 2023-04-19 PROCEDURE — G8400 PT W/DXA NO RESULTS DOC: HCPCS | Performed by: NURSE PRACTITIONER

## 2023-04-19 PROCEDURE — 3017F COLORECTAL CA SCREEN DOC REV: CPT | Performed by: NURSE PRACTITIONER

## 2023-04-19 PROCEDURE — 1090F PRES/ABSN URINE INCON ASSESS: CPT | Performed by: NURSE PRACTITIONER

## 2023-04-19 PROCEDURE — 3078F DIAST BP <80 MM HG: CPT | Performed by: NURSE PRACTITIONER

## 2023-04-19 PROCEDURE — G8417 CALC BMI ABV UP PARAM F/U: HCPCS | Performed by: NURSE PRACTITIONER

## 2023-04-19 PROCEDURE — 93923 UPR/LXTR ART STDY 3+ LVLS: CPT

## 2023-04-19 PROCEDURE — 4004F PT TOBACCO SCREEN RCVD TLK: CPT | Performed by: NURSE PRACTITIONER

## 2023-04-19 RX ORDER — DOXAZOSIN MESYLATE 4 MG/1
TABLET ORAL
COMMUNITY
Start: 2023-03-19

## 2023-04-19 RX ORDER — DULOXETIN HYDROCHLORIDE 60 MG/1
CAPSULE, DELAYED RELEASE ORAL DAILY
COMMUNITY
Start: 2023-03-19

## 2023-04-19 RX ORDER — BROMFENAC SODIUM 0.7 MG/ML
SOLUTION/ DROPS OPHTHALMIC
COMMUNITY
Start: 2023-02-17

## 2023-04-19 RX ORDER — CLARITHROMYCIN 500 MG/1
TABLET, COATED ORAL
COMMUNITY
Start: 2023-04-17

## 2023-04-19 RX ORDER — DOXYCYCLINE HYCLATE 100 MG/1
CAPSULE ORAL
COMMUNITY
Start: 2023-02-10

## 2023-04-19 RX ORDER — CALCIUM CITRATE/VITAMIN D3 200MG-6.25
TABLET ORAL
COMMUNITY
Start: 2023-02-15

## 2023-04-19 RX ORDER — LATANOPROST 50 UG/ML
SOLUTION/ DROPS OPHTHALMIC
COMMUNITY
Start: 2023-04-15

## 2023-04-19 RX ORDER — BISOPROLOL FUMARATE 10 MG/1
10 TABLET, FILM COATED ORAL DAILY
COMMUNITY
Start: 2023-04-03

## 2023-04-19 RX ORDER — TIZANIDINE 4 MG/1
4 TABLET ORAL EVERY 8 HOURS
COMMUNITY
Start: 2023-04-10

## 2023-04-19 RX ORDER — OXYCODONE HYDROCHLORIDE AND ACETAMINOPHEN 5; 325 MG/1; MG/1
TABLET ORAL
COMMUNITY
Start: 2023-04-10

## 2023-04-19 RX ORDER — AMLODIPINE BESYLATE 10 MG/1
10 TABLET ORAL DAILY
COMMUNITY
Start: 2023-04-03

## 2023-04-19 NOTE — PROGRESS NOTES
daily as needed for Cough  (Patient not taking: Reported on 2023)      conjugated estrogens (PREMARIN) 0.625 MG/GM vaginal cream Apply 1 gm to vagina twice weekly (Patient not taking: Reported on 2023) 30 g 3    oxyCODONE 5 MG capsule Take 5 mg by mouth every 12 hours. Latanoprost (XELPROS) 0.005 % EMUL Apply 1 drop to eye nightly each eye (Patient not taking: Reported on 2023)      doxazosin (CARDURA) 1 MG tablet Take 2 mg by mouth nightly (Patient not taking: Reported on 2023)      busPIRone (BUSPAR) 15 MG tablet Take 15 mg by mouth daily as needed (Patient not taking: Reported on 2023)      bisoprolol (ZEBETA) 5 MG tablet Take 10 mg by mouth daily  (Patient not taking: Reported on 2023)      DULoxetine HCl (CYMBALTA PO) Take 60 mg by mouth daily. (Patient not taking: Reported on 2023)       No current facility-administered medications for this visit.      Allergies: Codeine, Amoxicillin-pot clavulanate, Best-c [ascorbate], Fluticasone-salmeterol, Hctz, Hydrochlorothiazide, Irbesartan, Irbesartan-hydrochlorothiazide, Norvasc [amlodipine], Simvastatin, and Thiazide-type diuretics  Past Medical History:   Diagnosis Date    Carpal tunnel syndrome     Chronic back pain     Diabetes ([facility] [address]     DVT (deep venous thrombosis) ([facility] [address]     History of blood transfusion     Hx of blood clots     DVT-POST OP  SECTION, DVT X 2 SINCE THEN    Hyperlipidemia     Hypertension     Osteoarthritis     Pancreatitis     Tendonitis     Unspecified sleep apnea     Pt does not sleep with C-pap machine    Varicose vein 2014     Past Surgical History:   Procedure Laterality Date    APPENDECTOMY      BACK SURGERY      x2    BACK SURGERY      stimulator    CARPAL TUNNEL RELEASE      right hand     SECTION      X 3    CHOLECYSTECTOMY      COLONOSCOPY  2008    COLONOSCOPY  10/25/2011    Dr Jorge A Marte: tubular adenoma    CYST REMOVAL      GANGLION CYST REMOVAL    ERCP  2009

## 2023-05-02 RX ORDER — CLOPIDOGREL BISULFATE 75 MG/1
75 TABLET ORAL DAILY
Qty: 30 TABLET | Refills: 3 | Status: SHIPPED | OUTPATIENT
Start: 2023-05-02

## 2023-07-26 ENCOUNTER — OFFICE VISIT (OUTPATIENT)
Dept: VASCULAR SURGERY | Age: 73
End: 2023-07-26
Payer: MEDICARE

## 2023-07-26 ENCOUNTER — HOSPITAL ENCOUNTER (OUTPATIENT)
Dept: VASCULAR LAB | Age: 73
Discharge: HOME | End: 2023-07-26
Payer: MEDICARE

## 2023-07-26 VITALS
TEMPERATURE: 97.4 F | DIASTOLIC BLOOD PRESSURE: 87 MMHG | HEART RATE: 67 BPM | SYSTOLIC BLOOD PRESSURE: 146 MMHG | OXYGEN SATURATION: 92 %

## 2023-07-26 DIAGNOSIS — R09.89 LEFT CAROTID BRUIT: Primary | ICD-10-CM

## 2023-07-26 DIAGNOSIS — I70.213 ATHEROSCLER OF NATIVE ARTERY OF BOTH LEGS WITH INTERMIT CLAUDICATION (HCC): ICD-10-CM

## 2023-07-26 PROCEDURE — 93923 UPR/LXTR ART STDY 3+ LVLS: CPT

## 2023-07-26 PROCEDURE — G8427 DOCREV CUR MEDS BY ELIG CLIN: HCPCS | Performed by: NURSE PRACTITIONER

## 2023-07-26 PROCEDURE — 99214 OFFICE O/P EST MOD 30 MIN: CPT | Performed by: NURSE PRACTITIONER

## 2023-07-26 PROCEDURE — 1090F PRES/ABSN URINE INCON ASSESS: CPT | Performed by: NURSE PRACTITIONER

## 2023-07-26 PROCEDURE — 3017F COLORECTAL CA SCREEN DOC REV: CPT | Performed by: NURSE PRACTITIONER

## 2023-07-26 PROCEDURE — G8421 BMI NOT CALCULATED: HCPCS | Performed by: NURSE PRACTITIONER

## 2023-07-26 PROCEDURE — 3078F DIAST BP <80 MM HG: CPT | Performed by: NURSE PRACTITIONER

## 2023-07-26 PROCEDURE — 4004F PT TOBACCO SCREEN RCVD TLK: CPT | Performed by: NURSE PRACTITIONER

## 2023-07-26 PROCEDURE — 1123F ACP DISCUSS/DSCN MKR DOCD: CPT | Performed by: NURSE PRACTITIONER

## 2023-07-26 PROCEDURE — G8400 PT W/DXA NO RESULTS DOC: HCPCS | Performed by: NURSE PRACTITIONER

## 2023-07-26 PROCEDURE — 3074F SYST BP LT 130 MM HG: CPT | Performed by: NURSE PRACTITIONER

## 2023-07-26 NOTE — PROGRESS NOTES
risk of arterial thrombosis and to decrease rate of plaque buildup  Strongly encourage start/continue statin therapy -   Recommend no smoking - discussed the effect tobacco has on illness;   Proceed with cvls on Tuesday for bruit          Patient instructed to walk as much as possible. Call our office with any progressive pain in leg(s) or hip(s) with walking. Take good care of your feet. Let us know right away if you develop a wound on your foot. An electronic signature was used to authenticate this note.     --GALI Tinajero

## 2023-08-01 ENCOUNTER — HOSPITAL ENCOUNTER (OUTPATIENT)
Dept: VASCULAR LAB | Age: 73
Discharge: HOME | End: 2023-08-01
Payer: MEDICARE

## 2023-08-01 DIAGNOSIS — R09.89 LEFT CAROTID BRUIT: ICD-10-CM

## 2023-08-01 PROCEDURE — 93880 EXTRACRANIAL BILAT STUDY: CPT

## 2023-08-03 ENCOUNTER — TELEPHONE (OUTPATIENT)
Dept: VASCULAR SURGERY | Age: 73
End: 2023-08-03

## 2023-08-03 DIAGNOSIS — R09.89 LEFT CAROTID BRUIT: ICD-10-CM

## 2023-08-03 DIAGNOSIS — I70.213 ATHEROSCLER OF NATIVE ARTERY OF BOTH LEGS WITH INTERMIT CLAUDICATION (HCC): Primary | ICD-10-CM

## 2023-08-03 NOTE — TELEPHONE ENCOUNTER
Spoke with the patient to let her know the following. I let her know we would mail her a card in 6 months with an appointment. The patient acknowledged. ----- Message from GALI Quiroz sent at 8/3/2023  8:55 AM CDT -----  Please let her know that she has 50-69 % stenosis in left ica. This will need to be repeated in 6 months.

## 2023-08-08 RX ORDER — CLOPIDOGREL BISULFATE 75 MG/1
TABLET ORAL
Qty: 90 TABLET | Refills: 1 | Status: SHIPPED | OUTPATIENT
Start: 2023-08-08

## 2023-12-06 ENCOUNTER — TELEPHONE (OUTPATIENT)
Dept: OBGYN CLINIC | Age: 73
End: 2023-12-06

## 2023-12-06 ENCOUNTER — OFFICE VISIT (OUTPATIENT)
Dept: OBGYN CLINIC | Age: 73
End: 2023-12-06
Payer: MEDICARE

## 2023-12-06 VITALS
BODY MASS INDEX: 30.12 KG/M2 | HEART RATE: 76 BPM | HEIGHT: 63 IN | DIASTOLIC BLOOD PRESSURE: 70 MMHG | SYSTOLIC BLOOD PRESSURE: 122 MMHG | WEIGHT: 170 LBS

## 2023-12-06 DIAGNOSIS — R39.11 URINARY HESITANCY: ICD-10-CM

## 2023-12-06 DIAGNOSIS — N81.10 VAGINAL PROLAPSE: ICD-10-CM

## 2023-12-06 DIAGNOSIS — N39.0 RECURRENT UTI: Primary | ICD-10-CM

## 2023-12-06 PROCEDURE — 99204 OFFICE O/P NEW MOD 45 MIN: CPT | Performed by: NURSE PRACTITIONER

## 2023-12-06 PROCEDURE — G8417 CALC BMI ABV UP PARAM F/U: HCPCS | Performed by: NURSE PRACTITIONER

## 2023-12-06 PROCEDURE — G8400 PT W/DXA NO RESULTS DOC: HCPCS | Performed by: NURSE PRACTITIONER

## 2023-12-06 PROCEDURE — 1090F PRES/ABSN URINE INCON ASSESS: CPT | Performed by: NURSE PRACTITIONER

## 2023-12-06 PROCEDURE — G8484 FLU IMMUNIZE NO ADMIN: HCPCS | Performed by: NURSE PRACTITIONER

## 2023-12-06 PROCEDURE — 3017F COLORECTAL CA SCREEN DOC REV: CPT | Performed by: NURSE PRACTITIONER

## 2023-12-06 PROCEDURE — 3078F DIAST BP <80 MM HG: CPT | Performed by: NURSE PRACTITIONER

## 2023-12-06 PROCEDURE — 3074F SYST BP LT 130 MM HG: CPT | Performed by: NURSE PRACTITIONER

## 2023-12-06 PROCEDURE — G8427 DOCREV CUR MEDS BY ELIG CLIN: HCPCS | Performed by: NURSE PRACTITIONER

## 2023-12-06 PROCEDURE — 1123F ACP DISCUSS/DSCN MKR DOCD: CPT | Performed by: NURSE PRACTITIONER

## 2023-12-06 PROCEDURE — 4004F PT TOBACCO SCREEN RCVD TLK: CPT | Performed by: NURSE PRACTITIONER

## 2023-12-06 RX ORDER — ESTRADIOL 0.1 MG/G
1 CREAM VAGINAL
Qty: 42.5 G | Refills: 3 | Status: SHIPPED | OUTPATIENT
Start: 2023-12-06

## 2023-12-06 RX ORDER — TIMOLOL MALEATE 5 MG/ML
SOLUTION/ DROPS OPHTHALMIC
COMMUNITY
Start: 2023-11-20

## 2023-12-06 RX ORDER — ESTRADIOL 0.1 MG/G
1 CREAM VAGINAL
Qty: 42.5 G | Refills: 3 | Status: SHIPPED | OUTPATIENT
Start: 2023-12-06 | End: 2023-12-06

## 2023-12-06 ASSESSMENT — ENCOUNTER SYMPTOMS
RESPIRATORY NEGATIVE: 1
GASTROINTESTINAL NEGATIVE: 1
EYES NEGATIVE: 1
ALLERGIC/IMMUNOLOGIC NEGATIVE: 1

## 2024-01-15 ENCOUNTER — TELEPHONE (OUTPATIENT)
Dept: GASTROENTEROLOGY | Age: 74
End: 2024-01-15

## 2024-01-15 NOTE — TELEPHONE ENCOUNTER
Dr Carvajal office called to check status of this referral. I told her we received it, but I don't see anything scheduled yet.    Please, give pt a call to see if they qualify for OA CLN.

## 2024-01-31 ENCOUNTER — OFFICE VISIT (OUTPATIENT)
Dept: GASTROENTEROLOGY | Age: 74
End: 2024-01-31
Payer: MEDICARE

## 2024-01-31 VITALS
BODY MASS INDEX: 29.02 KG/M2 | WEIGHT: 170 LBS | SYSTOLIC BLOOD PRESSURE: 128 MMHG | DIASTOLIC BLOOD PRESSURE: 80 MMHG | HEIGHT: 64 IN

## 2024-01-31 DIAGNOSIS — R13.10 DYSPHAGIA, UNSPECIFIED TYPE: Primary | ICD-10-CM

## 2024-01-31 DIAGNOSIS — K21.9 GASTROESOPHAGEAL REFLUX DISEASE, UNSPECIFIED WHETHER ESOPHAGITIS PRESENT: ICD-10-CM

## 2024-01-31 DIAGNOSIS — Z12.11 COLON CANCER SCREENING: ICD-10-CM

## 2024-01-31 PROCEDURE — 99204 OFFICE O/P NEW MOD 45 MIN: CPT | Performed by: NURSE PRACTITIONER

## 2024-01-31 PROCEDURE — 1123F ACP DISCUSS/DSCN MKR DOCD: CPT | Performed by: NURSE PRACTITIONER

## 2024-01-31 PROCEDURE — 3017F COLORECTAL CA SCREEN DOC REV: CPT | Performed by: NURSE PRACTITIONER

## 2024-01-31 PROCEDURE — 1090F PRES/ABSN URINE INCON ASSESS: CPT | Performed by: NURSE PRACTITIONER

## 2024-01-31 PROCEDURE — G8417 CALC BMI ABV UP PARAM F/U: HCPCS | Performed by: NURSE PRACTITIONER

## 2024-01-31 PROCEDURE — 3079F DIAST BP 80-89 MM HG: CPT | Performed by: NURSE PRACTITIONER

## 2024-01-31 PROCEDURE — G8400 PT W/DXA NO RESULTS DOC: HCPCS | Performed by: NURSE PRACTITIONER

## 2024-01-31 PROCEDURE — 4004F PT TOBACCO SCREEN RCVD TLK: CPT | Performed by: NURSE PRACTITIONER

## 2024-01-31 PROCEDURE — 3074F SYST BP LT 130 MM HG: CPT | Performed by: NURSE PRACTITIONER

## 2024-01-31 PROCEDURE — G8484 FLU IMMUNIZE NO ADMIN: HCPCS | Performed by: NURSE PRACTITIONER

## 2024-01-31 PROCEDURE — G8427 DOCREV CUR MEDS BY ELIG CLIN: HCPCS | Performed by: NURSE PRACTITIONER

## 2024-01-31 RX ORDER — ONDANSETRON 4 MG/1
4 TABLET, FILM COATED ORAL EVERY 8 HOURS PRN
Qty: 30 TABLET | Refills: 0 | Status: SHIPPED | OUTPATIENT
Start: 2024-01-31

## 2024-01-31 RX ORDER — PANTOPRAZOLE SODIUM 40 MG/1
40 TABLET, DELAYED RELEASE ORAL
Qty: 30 TABLET | Refills: 11 | Status: SHIPPED | OUTPATIENT
Start: 2024-01-31

## 2024-01-31 NOTE — PATIENT INSTRUCTIONS
inside your colon. The doctor uses a thin, lighted tube called a colonoscope to look for problems. These include small growths called polyps, cancer, or bleeding.  During the test, the doctor can take samples of tissue that can be checked for cancer or other problems. This is called a biopsy. The doctor can also take out polyps.  Before the test, you will need to stop eating solid foods. You also will be given instructions on how to clean out your colon. This helps your doctor be able to see inside your colon during the test.  How do you prepare for the procedure?  Procedures can be stressful. This information will help you understand what you can expect. And it will help you safely prepare for your procedure.  Preparing for the procedure    Be sure you have someone to take you home. Anesthesia and pain medicine will make it unsafe for you to drive or get home on your own.    Understand exactly what procedure is planned, along with the risks, benefits, and other options.     Tell your doctor ALL the medicines, vitamins, supplements, and herbal remedies you take. Some may increase the risk of problems during your procedure. Your doctor will tell you if you should stop taking any of them before the procedure and how soon to do it.     If you take a medicine that prevents blood clots, your doctor may tell you to stop taking it before your procedure. Or your doctor may tell you to keep taking it. (These medicines include aspirin and other blood thinners.) Make sure that you understand exactly what your doctor wants you to do.     Make sure your doctor and the hospital have a copy of your advance directive. If you don't have one, you may want to prepare one. It lets others know your health care wishes. It's a good thing to have before any type of surgery or procedure.   Before the procedure    Follow your doctor's directions about when to stop eating solid foods and drink only clear liquids. You can drink water, clear

## 2024-02-06 NOTE — PROGRESS NOTES
Kirsten Tello (:  1950) is a 73 y.o. female,Established patient, here for evaluation of the following chief complaint(s):  Follow-up (Follow up carotid/LUPILLO completed on 2024.)            SUBJECTIVE/OBJECTIVE:  Kirsten has a history of peripheral vascular disease of the lower extremities.  She has had this for 1 - 5 years. Current treatment includes clopidogrel 75 mg po qd, ASA EC daily.  Kirsten has not had new wounds. Recently, she reports claudication at a distance of  which varies.  Kirsten reports that the left leg is more significant than the right.  She reports claudication is not changed and is mostly in the form of crampy type pain starting in the calves. She has a short recovery time. This is reproducible in nature. She reports ischemic rest pain 0 times per night.  She reports walking with cart does not help.    She presents for follow up of carotid artery stenosis.  She has a known history of carotid artery stenosis for 1 - 5 years. Her current treatment includes clopidogrel 75 mg po qd, ASA EC daily.She denies a history of CVA.  She reports no TIA's, episodes of lateralizing weakness and episodes of amaurosis fugax.      I have personally reviewed the following: problem list, current meds, allergies, PMH, PSH, family hx, and social hx  Kirsten Tello is a 73 y.o. female with the following history as recorded in [facility]:  Patient Active Problem List    Diagnosis Date Noted    Atherosclerosis with claudication of extremity (HCC) 2022    Chest pain     PVD (peripheral vascular disease) ([facility])     Osteoarthritis 2022    Failed back syndrome of lumbar spine 2021    Lumbar radiculopathy 2021    Duodenitis 2020    Acute pancreatitis 2018    Pancreatitis, recurrent 2018    Productive cough 10/26/2016    History of adenomatous polyp of colon 10/26/2016    Irritable bowel syndrome with constipation 2014    Narcotic dependence ([facility])

## 2024-02-09 ENCOUNTER — HOSPITAL ENCOUNTER (OUTPATIENT)
Dept: VASCULAR LAB | Age: 74
Discharge: HOME | End: 2024-02-09
Payer: MEDICARE

## 2024-02-09 DIAGNOSIS — R09.89 LEFT CAROTID BRUIT: ICD-10-CM

## 2024-02-09 DIAGNOSIS — I70.213 ATHEROSCLER OF NATIVE ARTERY OF BOTH LEGS WITH INTERMIT CLAUDICATION (HCC): ICD-10-CM

## 2024-02-09 PROCEDURE — 93880 EXTRACRANIAL BILAT STUDY: CPT

## 2024-02-09 PROCEDURE — 93923 UPR/LXTR ART STDY 3+ LVLS: CPT

## 2024-02-14 ENCOUNTER — OFFICE VISIT (OUTPATIENT)
Dept: VASCULAR SURGERY | Age: 74
End: 2024-02-14
Payer: MEDICARE

## 2024-02-14 VITALS
OXYGEN SATURATION: 98 % | DIASTOLIC BLOOD PRESSURE: 60 MMHG | SYSTOLIC BLOOD PRESSURE: 102 MMHG | TEMPERATURE: 97.2 F | HEART RATE: 69 BPM

## 2024-02-14 DIAGNOSIS — I70.213 ATHEROSCLER OF NATIVE ARTERY OF BOTH LEGS WITH INTERMIT CLAUDICATION (HCC): Primary | ICD-10-CM

## 2024-02-14 DIAGNOSIS — I65.23 BILATERAL CAROTID ARTERY STENOSIS: ICD-10-CM

## 2024-02-14 PROCEDURE — 99214 OFFICE O/P EST MOD 30 MIN: CPT | Performed by: NURSE PRACTITIONER

## 2024-02-14 PROCEDURE — 1123F ACP DISCUSS/DSCN MKR DOCD: CPT | Performed by: NURSE PRACTITIONER

## 2024-02-14 PROCEDURE — 4004F PT TOBACCO SCREEN RCVD TLK: CPT | Performed by: NURSE PRACTITIONER

## 2024-02-14 PROCEDURE — 3017F COLORECTAL CA SCREEN DOC REV: CPT | Performed by: NURSE PRACTITIONER

## 2024-02-14 PROCEDURE — G8400 PT W/DXA NO RESULTS DOC: HCPCS | Performed by: NURSE PRACTITIONER

## 2024-02-14 PROCEDURE — 1090F PRES/ABSN URINE INCON ASSESS: CPT | Performed by: NURSE PRACTITIONER

## 2024-02-14 PROCEDURE — G8427 DOCREV CUR MEDS BY ELIG CLIN: HCPCS | Performed by: NURSE PRACTITIONER

## 2024-02-14 PROCEDURE — 3074F SYST BP LT 130 MM HG: CPT | Performed by: NURSE PRACTITIONER

## 2024-02-14 PROCEDURE — G8417 CALC BMI ABV UP PARAM F/U: HCPCS | Performed by: NURSE PRACTITIONER

## 2024-02-14 PROCEDURE — 3078F DIAST BP <80 MM HG: CPT | Performed by: NURSE PRACTITIONER

## 2024-02-14 PROCEDURE — G8484 FLU IMMUNIZE NO ADMIN: HCPCS | Performed by: NURSE PRACTITIONER

## 2024-02-18 DIAGNOSIS — I70.213 ATHEROSCLER OF NATIVE ARTERY OF BOTH LEGS WITH INTERMIT CLAUDICATION (HCC): Primary | ICD-10-CM

## 2024-02-19 RX ORDER — CLOPIDOGREL BISULFATE 75 MG/1
75 TABLET ORAL DAILY
Qty: 90 TABLET | Refills: 1 | Status: SHIPPED | OUTPATIENT
Start: 2024-02-19

## 2024-03-05 ENCOUNTER — TELEPHONE (OUTPATIENT)
Dept: GASTROENTEROLOGY | Age: 74
End: 2024-03-05

## 2024-03-05 NOTE — TELEPHONE ENCOUNTER
Patient: Kirsten Tello    YOB: 1950      Clearance was received on March 5, 2024.    for Endoscopy / Colonoscopy scheduled for: 3/19/24    Patient may discontinue the use of PLAVIX for 5  days prior to the procedure.    IS Lovenox required:  YES - MATHIEU ROD    PATIENT NOTIFIED ON:  3/5/24 -      Clearance scanned into media

## 2024-03-06 ENCOUNTER — TELEPHONE (OUTPATIENT)
Dept: VASCULAR SURGERY | Age: 74
End: 2024-03-06

## 2024-03-06 NOTE — TELEPHONE ENCOUNTER
Called and spoke to the patient to let her know the following.   The patient acknowledged.  I am calling in her Lovenox shots to [facility].                  Lovenox directions    Procedure date and time:  03/19/2024    Begin holding antiplatelet med: Plavix - :  03/14/2024      Start Lovenox: 03/15/2024  (24 hours after first missed dose)    Continue Lovenox: 03/16/2024 7 AM  03/17/2024 7 AM    Last does Lovenox  24 hours before procedure:  03/18/2024 7 AM    Begin Plavix the evening of the procedure      Call the office or go to the emergency room with any signs or symptoms of bleeding

## 2024-03-07 RX ORDER — ENOXAPARIN SODIUM 100 MG/ML
40 INJECTION SUBCUTANEOUS DAILY
Qty: 4 EACH | Refills: 0 | Status: SHIPPED | OUTPATIENT
Start: 2024-03-07

## 2024-03-13 ENCOUNTER — ANESTHESIA EVENT (OUTPATIENT)
Dept: ENDOSCOPY | Age: 74
End: 2024-03-13
Payer: MEDICARE

## 2024-03-18 ENCOUNTER — TELEPHONE (OUTPATIENT)
Dept: GASTROENTEROLOGY | Age: 74
End: 2024-03-18

## 2024-03-18 NOTE — TELEPHONE ENCOUNTER
Called patient to remind them of their procedure with Dr. PADGETT at [facility]  on 3/19/24  to arrive at 815     RESPONSE:  CONFIRMED

## 2024-03-19 ENCOUNTER — HOSPITAL ENCOUNTER (OUTPATIENT)
Age: 74
Setting detail: OUTPATIENT SURGERY
Discharge: HOME | End: 2024-03-19
Attending: INTERNAL MEDICINE | Admitting: INTERNAL MEDICINE
Payer: MEDICARE

## 2024-03-19 ENCOUNTER — ANESTHESIA (OUTPATIENT)
Dept: ENDOSCOPY | Age: 74
End: 2024-03-19
Payer: MEDICARE

## 2024-03-19 VITALS
TEMPERATURE: 97.3 F | OXYGEN SATURATION: 94 % | DIASTOLIC BLOOD PRESSURE: 78 MMHG | HEART RATE: 69 BPM | HEIGHT: 64 IN | BODY MASS INDEX: 29.02 KG/M2 | WEIGHT: 170 LBS | RESPIRATION RATE: 16 BRPM | SYSTOLIC BLOOD PRESSURE: 156 MMHG

## 2024-03-19 DIAGNOSIS — K21.9 GASTROESOPHAGEAL REFLUX DISEASE, UNSPECIFIED WHETHER ESOPHAGITIS PRESENT: ICD-10-CM

## 2024-03-19 DIAGNOSIS — Z12.11 COLON CANCER SCREENING: ICD-10-CM

## 2024-03-19 DIAGNOSIS — R13.10 DYSPHAGIA, UNSPECIFIED TYPE: ICD-10-CM

## 2024-03-19 LAB
GLUCOSE BLD-MCNC: 261 MG/DL (ref 70–99)
PERFORMED ON: ABNORMAL

## 2024-03-19 PROCEDURE — 2580000003 HC RX 258: Performed by: INTERNAL MEDICINE

## 2024-03-19 PROCEDURE — 88305 TISSUE EXAM BY PATHOLOGIST: CPT

## 2024-03-19 PROCEDURE — 3609012700 HC EGD DILATION SAVORY: Performed by: INTERNAL MEDICINE

## 2024-03-19 PROCEDURE — 6360000002 HC RX W HCPCS: Performed by: NURSE ANESTHETIST, CERTIFIED REGISTERED

## 2024-03-19 PROCEDURE — 2500000003 HC RX 250 WO HCPCS: Performed by: NURSE ANESTHETIST, CERTIFIED REGISTERED

## 2024-03-19 PROCEDURE — 88342 IMHCHEM/IMCYTCHM 1ST ANTB: CPT

## 2024-03-19 PROCEDURE — 7100000010 HC PHASE II RECOVERY - FIRST 15 MIN: Performed by: INTERNAL MEDICINE

## 2024-03-19 PROCEDURE — 3609012400 HC EGD TRANSORAL BIOPSY SINGLE/MULTIPLE: Performed by: INTERNAL MEDICINE

## 2024-03-19 PROCEDURE — 43248 EGD GUIDE WIRE INSERTION: CPT | Performed by: INTERNAL MEDICINE

## 2024-03-19 PROCEDURE — 3609010600 HC COLONOSCOPY POLYPECTOMY SNARE/COLD BIOPSY: Performed by: INTERNAL MEDICINE

## 2024-03-19 PROCEDURE — 82962 GLUCOSE BLOOD TEST: CPT

## 2024-03-19 PROCEDURE — 43239 EGD BIOPSY SINGLE/MULTIPLE: CPT | Performed by: INTERNAL MEDICINE

## 2024-03-19 PROCEDURE — 3700000001 HC ADD 15 MINUTES (ANESTHESIA): Performed by: INTERNAL MEDICINE

## 2024-03-19 PROCEDURE — 45380 COLONOSCOPY AND BIOPSY: CPT | Performed by: INTERNAL MEDICINE

## 2024-03-19 PROCEDURE — 7100000011 HC PHASE II RECOVERY - ADDTL 15 MIN: Performed by: INTERNAL MEDICINE

## 2024-03-19 PROCEDURE — 3700000000 HC ANESTHESIA ATTENDED CARE: Performed by: INTERNAL MEDICINE

## 2024-03-19 PROCEDURE — 2709999900 HC NON-CHARGEABLE SUPPLY: Performed by: INTERNAL MEDICINE

## 2024-03-19 RX ORDER — PROPOFOL 10 MG/ML
INJECTION, EMULSION INTRAVENOUS PRN
Status: DISCONTINUED | OUTPATIENT
Start: 2024-03-19 | End: 2024-03-19 | Stop reason: SDUPTHER

## 2024-03-19 RX ORDER — ONDANSETRON 2 MG/ML
INJECTION INTRAMUSCULAR; INTRAVENOUS PRN
Status: DISCONTINUED | OUTPATIENT
Start: 2024-03-19 | End: 2024-03-19 | Stop reason: SDUPTHER

## 2024-03-19 RX ORDER — SODIUM CHLORIDE, SODIUM LACTATE, POTASSIUM CHLORIDE, CALCIUM CHLORIDE 600; 310; 30; 20 MG/100ML; MG/100ML; MG/100ML; MG/100ML
INJECTION, SOLUTION INTRAVENOUS CONTINUOUS
Status: DISCONTINUED | OUTPATIENT
Start: 2024-03-19 | End: 2024-03-19 | Stop reason: HOSPADM

## 2024-03-19 RX ORDER — LIDOCAINE HYDROCHLORIDE 10 MG/ML
INJECTION, SOLUTION INFILTRATION; PERINEURAL PRN
Status: DISCONTINUED | OUTPATIENT
Start: 2024-03-19 | End: 2024-03-19 | Stop reason: SDUPTHER

## 2024-03-19 RX ADMIN — ONDANSETRON 4 MG: 2 INJECTION INTRAMUSCULAR; INTRAVENOUS at 09:30

## 2024-03-19 RX ADMIN — PROPOFOL 250 MG: 10 INJECTION, EMULSION INTRAVENOUS at 09:17

## 2024-03-19 RX ADMIN — SODIUM CHLORIDE, SODIUM LACTATE, POTASSIUM CHLORIDE, AND CALCIUM CHLORIDE: 600; 310; 30; 20 INJECTION, SOLUTION INTRAVENOUS at 09:11

## 2024-03-19 RX ADMIN — LIDOCAINE HYDROCHLORIDE 40 MG: 10 INJECTION, SOLUTION INFILTRATION; PERINEURAL at 09:17

## 2024-03-19 ASSESSMENT — LIFESTYLE VARIABLES: SMOKING_STATUS: 1

## 2024-03-19 ASSESSMENT — ENCOUNTER SYMPTOMS: SHORTNESS OF BREATH: 0

## 2024-03-19 ASSESSMENT — PAIN - FUNCTIONAL ASSESSMENT: PAIN_FUNCTIONAL_ASSESSMENT: 0-10

## 2024-03-19 NOTE — OP NOTE
Patient: Kirsten Tello : 1950  [facility] Rec#: [id number] Acc#: [id number]   Primary Care Provider Ruben Hu MD    Date of Procedure:  3/19/2024    Endoscopist: Laz Carrasco MD    Referring Provider: Ruben Hu MD,     Operation Performed: Colonoscopy with biopsy    Indications: screening    Anesthesia:  Sedation was administered by anesthesia who monitored the patient during the procedure.    I met with Kirsten Tello prior to procedure. We discussed the procedure itself, and I have discussed the risks of endoscopy (including-- but not limited to-- pain, discomfort, bleeding potentially requiring second endoscopic procedure and/or blood transfusion, organ perforation requiring operative repair, damage to organs near the colon, infection, aspiration, cardiopulmonary/allergic reaction), benefits, indications to endoscopy. Additionally, we discussed options other than colonoscopy. The patient expressed understanding. All questions answered. The patient decided to proceed with the procedure.  Signed informed consent was placed on the chart.    Blood Loss: minimal    Withdrawal time: > 6 minutes  Bowel Prep: adequate     Complications: no immediate complications    DESCRIPTION OF PROCEDURE:     A time out was performed. After written informed consent was obtained, the patient was placed in the left lateral position.     The perianal area was inspected, and a digital rectal exam was performed. A rectal exam was performed: normal tone, no palpable lesions. At this point, a forward viewing Olympus colonoscope was inserted into the anus and carefully advanced to the cecum.  The cecum was identified by the ileocecal valve and the appendiceal orifice. The colonoscope was then slowly withdrawn with careful inspection of the mucosa in a linear and circumferential fashion. The scope was retroflexed in the rectum. Suction was utilized during the procedure to remove as much air as possible from the  bowel. The colonoscope was removed from the patient, and the procedure was terminated.  Findings are listed below.        Findings:     The mucosa appeared normal throughout the entire examined colon     In the the rectum, a 4 mm sessile polyp was removed completely with forceps polypectomy.      Retroflexion in the rectum was normal and revealed no further abnormalities         Recommendations:  1. Repeat colonoscopy: pending pathology - max of 5 yrs for screening  2. Await biopsy results.     Findings and recommendations were discussed w/ the patient. A copy of the images was provided.    Laz Carrasco MD  3/19/2024  9:48 AM

## 2024-03-19 NOTE — ANESTHESIA POSTPROCEDURE EVALUATION
Department of Anesthesiology  Postprocedure Note    Patient: Kirsten Tello  MRN: [id number]  YOB: 1950  Date of evaluation: 3/19/2024    Procedure Summary       Date: 03/19/24 Room / Location: [place] [address] / [facility]    Anesthesia Start: 0911 Anesthesia Stop:     Procedures:       ESOPHAGOGASTRODUODENOSCOPY BIOPSY      COLONOSCOPY POLYPECTOMY SNARE/COLD BIOPSY      ESOPHAGOGASTRODUODENOSCOPY DILATION SAVORY Diagnosis:       Dysphagia, unspecified type      Gastroesophageal reflux disease, unspecified whether esophagitis present      Colon cancer screening      (Dysphagia, unspecified type [R13.10])      (Gastroesophageal reflux disease, unspecified whether esophagitis present [K21.9])      (Colon cancer screening [Z12.11])    Surgeons: Laz Carrasco MD Responsible Provider: Rukhsana Barone APRN - CRNA    Anesthesia Type: general, TIVA ASA Status: 3            Anesthesia Type: No value filed.    Akhil Phase I: Akhil Score: 10    Akhil Phase II:      Anesthesia Post Evaluation    Patient location during evaluation: bedside  Patient participation: complete - patient participated  Level of consciousness: sleepy but conscious  Pain score: 0  Airway patency: patent  Nausea & Vomiting: no nausea and no vomiting  Cardiovascular status: hemodynamically stable and blood pressure returned to baseline  Respiratory status: acceptable and nasal cannula  Hydration status: stable  Pain management: adequate    No notable events documented.

## 2024-03-19 NOTE — DISCHARGE INSTRUCTIONS
RECOMMENDATIONS:    1.  Await path results  2.  Repeat EGD with dilation as needed  3.  Continue PPI daily   4.  Repeat Colonoscopy in 5 yrs for screening     Upper GI Endoscopy and Colonoscopy: What to Expect at Home  Your Recovery    After an upper GI endoscopy, you may have a sore throat for a day or two after the test.    After a colonoscopy you may be bloated or have gas pains. You may need to pass gas. If a biopsy was done or a polyp was removed, you may have streaks of blood in your stool (feces) for a few days. Problems such as heavy rectal bleeding may not occur until several weeks after the test. This isn't common. But it can happen after polyps are removed.    How can you care for yourself at home?  Activity   Rest as much as you need to after you go home.  You should be able to go back to your usual activities the day after the test.  You should not drive or operate equipment for the remainder of today.    Diet   Follow your doctor's directions for eating after the test.  Unless your doctor has told you not to, drink plenty of fluids. This helps to replace the fluids that were lost during the colon prep.  Do not drink alcohol for 24 hours    Medications   If you have a sore throat the day after the test, use an over-the-counter spray to numb your throat.  If polyps were removed or biopsies removed, your doctor may tell you to continue holding your anticoagulants or NSAIDS. Check with your doctor to see when you can resume these types of medications.       Follow-up care is a key part of your treatment and safety. Be sure to make and go to all appointments, and call your doctor if you are having problems. It's also a good idea to know your test results and keep a list of the medicines you take.  When should you call for help?   Call 911 anytime you think you may need emergency care. For example, call if:    You passed out (lost consciousness).     You have trouble breathing.     You pass maroon or

## 2024-03-19 NOTE — H&P
Patient Name: Kirsten Tello  : 1950  MRN: [id number]  DATE: 24    Allergies:   Allergies   Allergen Reactions    Codeine Itching    Amoxicillin-Pot Clavulanate Nausea Only     Other reaction(s): Nausea And Vomiting, Vomiting    Best-C [Ascorbate]     Fluticasone-Salmeterol      Other reaction(s): Myalgias (Muscle Pain)    Hctz     Hydrochlorothiazide     Irbesartan      Other reaction(s): Headache, Unknown (See Comments)  PANCREATITIS       Irbesartan-Hydrochlorothiazide      Other reaction(s): Unknown (See Comments)  PANCREATITIS      Norvasc [Amlodipine] Other (See Comments)    Simvastatin      Other reaction(s): Myalgia    Thiazide-Type Diuretics      Other reaction(s): Unknown (See Comments)  PANCRAETITIS         ENDOSCOPY  History and Physical    Procedure:    [] Diagnostic Colonoscopy       [x] Screening Colonoscopy  [x] EGD      [] ERCP      [] EUS       [] Other    [x] Previous office notes/History and Physical reviewed from the patients chart. Please see EMR for further details of HPI. I have examined the patient's status immediately prior to the procedure and:      Indications/HPI:    []Abdominal Pain   []Barretts  [x]Screening/Surveillance   []History of Polyps  [x]Dysphagia            [] +Cologard/DNA testing  []Abnormal Imaging              []EOE Hx              [] Family Hx of CRC/Polyps  []Anemia                            []Food Impaction       []Recent Poor Prep  []GI Bleed             []Lymphadenopathy  []History of Polyps  []Change in bowel habits []Heartburn/Reflux  []Cancer- GI/Lung  []Chest Pain - Non Cardiac []Heme (+) Stool []Ulcers  []Constipation  []Hemoptysis  []Incontinence    []Diarrhea  []Hypoxemia  []Rectal Bleed (BRBPR)  []Nausea/Vomiting   [] Varices  []Crohns/Colitis  []Pancreatic Cyst   [] Cirrhosis   []Pancreatitis    []Abnormal MRCP  []Elevated LFT [] Stent Removal, Previous ERCP  []Other:     Anesthesia:   [x] MAC [] Moderate Sedation   [] General   []

## 2024-03-19 NOTE — ANESTHESIA PRE PROCEDURE
contractility with dobutamine infusion.     Left Ventricle   Left ventricular systolic function is normal. All left ventricular wall segments contract normally.     Additional Study Details   DSE with continuous EKG monitoring. The study is technically good for diagnosis.Verbal consent was obtained from the patient to use Definity contrast in order to optimize the study. The use of Definity was indicated as two or more contiguous segments of the left ventricular endocardial border were unable to be adequately visualized by standard imaging methods. 1.3 mL of mechanically activated Definity was mixed with 8.7 mL of normal saline. A total of 10 mL of the resulting Definity solution was administered, and the remaining contrast was wasted and discarded.   No adverse reaction to contrast was noted.       EK BPM  Sinus rhythm  Within normal limits as previously  Comparison Summary: No significant change  Summary: Normal ECG     Neuro/Psych:   (+) neuromuscular disease (chronic low back pain):depression/anxiety    (-) seizures and CVA            ROS comment: Chronic narc use GI/Hepatic/Renal:   (+) GERD: well controlled     (-) liver disease and no renal disease       Endo/Other:    (+) DiabetesType II DM, well controlled, blood dyscrasia (Plavix 3/11/2024 Lovenox 3/18/2024): anemia and anticoagulation therapy:., no malignancy/cancer.    (-) no malignancy/cancer               Abdominal: normal exam            Vascular:   + PVD, aortic or cerebral, DVT.       Other Findings:         Anesthesia Plan      general and TIVA     ASA 3       Induction: intravenous.      Anesthetic plan and risks discussed with patient.                      Rukhsana Barone, APRN - CRNA   3/19/2024

## 2024-03-19 NOTE — OP NOTE
Endoscopic Procedure Note    Patient: Kirsten Tello : 1950  [facility] Rec#: [id number] Acc#: [id number]     Primary Care Provider Ruben Hu MD  Referring Provider:     Endoscopist: Laz Carrasco MD    Date of Procedure:  3/19/2024    Procedure:   1. EGD with biopsy  2. EGD with wire guided dilation to 54F     Indications:   1. Dysphagia   2. Dyspepsia     Anesthesia:  Sedation was administered by anesthesia who monitored the patient during the procedure.    Estimated Blood Loss: minimal    Procedure:   After reviewing the patient's chart and obtaining informed consent, the patient was placed in the left lateral decubitus position.  A forward-viewing Olympus endoscope was lubricated and inserted through the mouth into the oropharynx. Under direct visualization, the upper esophagus was intubated. The scope was advanced to the level of the third portion of duodenum. Scope was slowly withdrawn with careful inspection of the mucosal surfaces. The scope was retroflexed for inspection of the gastric fundus and incisura. Findings and maneuvers are listed in impression below. The patient tolerated the procedure well. The scope was removed. There were no immediate complications.    Findings:   Esophagus: abnormal: no obvious mucosal disease, but questionable benign stricture in the distal esophagus. Dilated due to symptoms.  A guidewire was placed through the endoscope and the endoscope was removed.  A 54 Kinyarwanda savory dilator was advanced down the length of the esophagus used a fixed-point wire technique. The dilator and guidewire were removed.  The patient tolerated the procedure well.      There is no hiatal hernia present.      Stomach:  abnormal: mild mucosal changes suggestive of gastritis noted -  Gastric biopsies were taken from the antrum and body to rule out Helicobacter pylori infection.      Duodenum: normal      IMPRESSION:  1. Esophageal dysphagia - dilated.   2. Gastritis

## 2024-04-17 ENCOUNTER — OFFICE VISIT (OUTPATIENT)
Dept: NEUROSURGERY | Age: 74
End: 2024-04-17
Payer: MEDICARE

## 2024-04-17 VITALS
BODY MASS INDEX: 29.02 KG/M2 | HEART RATE: 77 BPM | SYSTOLIC BLOOD PRESSURE: 122 MMHG | HEIGHT: 64 IN | DIASTOLIC BLOOD PRESSURE: 68 MMHG | WEIGHT: 169.97 LBS

## 2024-04-17 DIAGNOSIS — M96.1 FAILED BACK SYNDROME OF LUMBAR SPINE: Primary | ICD-10-CM

## 2024-04-17 DIAGNOSIS — Z96.89 S/P INSERTION OF SPINAL CORD STIMULATOR: ICD-10-CM

## 2024-04-17 PROCEDURE — 3017F COLORECTAL CA SCREEN DOC REV: CPT | Performed by: NEUROLOGICAL SURGERY

## 2024-04-17 PROCEDURE — 99214 OFFICE O/P EST MOD 30 MIN: CPT | Performed by: NEUROLOGICAL SURGERY

## 2024-04-17 PROCEDURE — 1090F PRES/ABSN URINE INCON ASSESS: CPT | Performed by: NEUROLOGICAL SURGERY

## 2024-04-17 PROCEDURE — 3078F DIAST BP <80 MM HG: CPT | Performed by: NEUROLOGICAL SURGERY

## 2024-04-17 PROCEDURE — G8400 PT W/DXA NO RESULTS DOC: HCPCS | Performed by: NEUROLOGICAL SURGERY

## 2024-04-17 PROCEDURE — G8417 CALC BMI ABV UP PARAM F/U: HCPCS | Performed by: NEUROLOGICAL SURGERY

## 2024-04-17 PROCEDURE — 3074F SYST BP LT 130 MM HG: CPT | Performed by: NEUROLOGICAL SURGERY

## 2024-04-17 PROCEDURE — G8427 DOCREV CUR MEDS BY ELIG CLIN: HCPCS | Performed by: NEUROLOGICAL SURGERY

## 2024-04-17 PROCEDURE — 1123F ACP DISCUSS/DSCN MKR DOCD: CPT | Performed by: NEUROLOGICAL SURGERY

## 2024-04-17 PROCEDURE — 4004F PT TOBACCO SCREEN RCVD TLK: CPT | Performed by: NEUROLOGICAL SURGERY

## 2024-04-17 ASSESSMENT — ENCOUNTER SYMPTOMS
GASTROINTESTINAL NEGATIVE: 1
BACK PAIN: 1
RESPIRATORY NEGATIVE: 1
EYES NEGATIVE: 1

## 2024-04-17 NOTE — PROGRESS NOTES
Review of Systems   Constitutional: Negative.    HENT: Negative.     Eyes: Negative.    Respiratory: Negative.     Cardiovascular: Negative.    Gastrointestinal: Negative.    Genitourinary: Negative.    Musculoskeletal:  Positive for back pain, joint pain and myalgias.   Skin: Negative.    Neurological:  Positive for tingling and weakness.   Endo/Heme/Allergies: Negative.    Psychiatric/Behavioral: Negative.        
stimulator programming.      Electronically signed by Dion Niño MD on 4/17/2024 at 12:59 PM

## 2024-09-14 DIAGNOSIS — I70.213 ATHEROSCLER OF NATIVE ARTERY OF BOTH LEGS WITH INTERMIT CLAUDICATION (HCC): ICD-10-CM

## 2024-09-16 ENCOUNTER — TELEPHONE (OUTPATIENT)
Dept: VASCULAR SURGERY | Age: 74
End: 2024-09-16

## 2024-09-16 RX ORDER — CLOPIDOGREL BISULFATE 75 MG/1
75 TABLET ORAL DAILY
Qty: 90 TABLET | Refills: 1 | OUTPATIENT
Start: 2024-09-16

## 2024-11-21 DIAGNOSIS — I70.213 ATHEROSCLER OF NATIVE ARTERY OF BOTH LEGS WITH INTERMIT CLAUDICATION (HCC): ICD-10-CM

## 2024-11-21 RX ORDER — CLOPIDOGREL BISULFATE 75 MG/1
75 TABLET ORAL DAILY
Qty: 90 TABLET | Refills: 1 | Status: SHIPPED | OUTPATIENT
Start: 2024-11-21

## 2024-12-11 ENCOUNTER — HOSPITAL ENCOUNTER (OUTPATIENT)
Dept: NON INVASIVE DIAGNOSTICS | Age: 74
Discharge: HOME | End: 2024-12-13
Payer: MEDICARE

## 2024-12-11 ENCOUNTER — OFFICE VISIT (OUTPATIENT)
Dept: VASCULAR SURGERY | Age: 74
End: 2024-12-11
Payer: MEDICARE

## 2024-12-11 VITALS — HEART RATE: 76 BPM | DIASTOLIC BLOOD PRESSURE: 82 MMHG | SYSTOLIC BLOOD PRESSURE: 147 MMHG | OXYGEN SATURATION: 98 %

## 2024-12-11 DIAGNOSIS — I70.213 ATHEROSCLER OF NATIVE ARTERY OF BOTH LEGS WITH INTERMIT CLAUDICATION (HCC): Primary | ICD-10-CM

## 2024-12-11 DIAGNOSIS — I70.213 ATHEROSCLER OF NATIVE ARTERY OF BOTH LEGS WITH INTERMIT CLAUDICATION (HCC): ICD-10-CM

## 2024-12-11 DIAGNOSIS — I65.23 BILATERAL CAROTID ARTERY STENOSIS: ICD-10-CM

## 2024-12-11 LAB
VAS LEFT ABI: 0.59
VAS LEFT ARM BP: 159 MMHG
VAS LEFT CALF PRESSURE: 99 MMHG
VAS LEFT DORSALIS PEDIS BP: 94 MMHG
VAS LEFT HIGH THIGH PRESSURE: 149 MMHG
VAS LEFT LOW THIGH PRESSURE: 106 MMHG
VAS LEFT PTA BP: 84 MMHG
VAS LEFT TBI: 0.21
VAS LEFT TOE PRESSURE: 33 MMHG
VAS RIGHT ABI: 0.93
VAS RIGHT ARM BP: 155 MMHG
VAS RIGHT CALF PRESSURE: 158 MMHG
VAS RIGHT DORSALIS PEDIS BP: 148 MMHG
VAS RIGHT HIGH THIGH PRESSURE: 206 MMHG
VAS RIGHT LOW THIGH PRESSURE: 174 MMHG
VAS RIGHT PTA BP: 148 MMHG
VAS RIGHT TBI: 0.25
VAS RIGHT TOE PRESSURE: 40 MMHG

## 2024-12-11 PROCEDURE — 1159F MED LIST DOCD IN RCRD: CPT | Performed by: NURSE PRACTITIONER

## 2024-12-11 PROCEDURE — 4004F PT TOBACCO SCREEN RCVD TLK: CPT | Performed by: NURSE PRACTITIONER

## 2024-12-11 PROCEDURE — 3077F SYST BP >= 140 MM HG: CPT | Performed by: NURSE PRACTITIONER

## 2024-12-11 PROCEDURE — 99214 OFFICE O/P EST MOD 30 MIN: CPT | Performed by: NURSE PRACTITIONER

## 2024-12-11 PROCEDURE — G8484 FLU IMMUNIZE NO ADMIN: HCPCS | Performed by: NURSE PRACTITIONER

## 2024-12-11 PROCEDURE — 93923 UPR/LXTR ART STDY 3+ LVLS: CPT | Performed by: SURGERY

## 2024-12-11 PROCEDURE — 1123F ACP DISCUSS/DSCN MKR DOCD: CPT | Performed by: NURSE PRACTITIONER

## 2024-12-11 PROCEDURE — G8400 PT W/DXA NO RESULTS DOC: HCPCS | Performed by: NURSE PRACTITIONER

## 2024-12-11 PROCEDURE — G8417 CALC BMI ABV UP PARAM F/U: HCPCS | Performed by: NURSE PRACTITIONER

## 2024-12-11 PROCEDURE — G8427 DOCREV CUR MEDS BY ELIG CLIN: HCPCS | Performed by: NURSE PRACTITIONER

## 2024-12-11 PROCEDURE — 1090F PRES/ABSN URINE INCON ASSESS: CPT | Performed by: NURSE PRACTITIONER

## 2024-12-11 PROCEDURE — 3079F DIAST BP 80-89 MM HG: CPT | Performed by: NURSE PRACTITIONER

## 2024-12-11 PROCEDURE — 93923 UPR/LXTR ART STDY 3+ LVLS: CPT

## 2024-12-11 RX ORDER — HYDROCODONE BITARTRATE AND ACETAMINOPHEN 7.5; 325 MG/1; MG/1
TABLET ORAL
COMMUNITY

## 2024-12-11 NOTE — PROGRESS NOTES
Kirsten Tello (:  1950) is a 74 y.o. female,Established patient, here for evaluation of the following chief complaint(s):  Follow-up            SUBJECTIVE/OBJECTIVE:  Kirsten has a history of peripheral vascular disease of the lower extremities.  She has had this for 1 - 5 years. Current treatment includes asa, plavix, and lipitor.  Kirsten has not had new wounds. Recently, she reports claudication at a distance of  which varies.  Kirsten reports that the left leg is more significant than the right.  She reports claudication is worsened and is mostly in the form of crampy type pain starting in the calves. She has a short recovery time. This is reproducible in nature. She reports ischemic rest pain 0 times per night.  She reports walking with cart does not help.    I have personally reviewed the following: problem list, current meds, allergies, PMH, PSH, family hx, and social hx  Kirsten Tello is a 74 y.o. female with the following history as recorded in [facility]:  Patient Active Problem List    Diagnosis Date Noted    Atherosclerosis with claudication of extremity (HCC) 2022    Chest pain     PVD (peripheral vascular disease) (HCC)     Osteoarthritis 2022    Failed back syndrome of lumbar spine 2021    Lumbar radiculopathy 2021    Duodenitis 2020    Acute pancreatitis 2018    Pancreatitis, recurrent 2018    Productive cough 10/26/2016    History of adenomatous polyp of colon 10/26/2016    Irritable bowel syndrome with constipation 2014    Narcotic dependence (HCC) 2014    Abdominal cramping 2014    Leg pain, bilateral 2014    Venous insufficiency of both lower extremities 2014    Varicose vein 2014    Chronic back pain 2013    HTN (hypertension) 2013    History of colon polyps 2012    Bloating 2012    Hemorrhoids 2012    Gastroesophageal reflux disease 2011    Constipation,

## 2025-01-16 ENCOUNTER — TRANSCRIBE ORDERS (OUTPATIENT)
Dept: ADMINISTRATIVE | Age: 75
End: 2025-01-16

## 2025-01-16 DIAGNOSIS — N18.31 STAGE 3A CHRONIC KIDNEY DISEASE (HCC): Primary | ICD-10-CM

## 2025-01-24 ENCOUNTER — HOSPITAL ENCOUNTER (OUTPATIENT)
Dept: ULTRASOUND IMAGING | Age: 75
Discharge: HOME | End: 2025-01-24
Attending: INTERNAL MEDICINE
Payer: MEDICARE

## 2025-01-24 DIAGNOSIS — N18.31 STAGE 3A CHRONIC KIDNEY DISEASE (HCC): ICD-10-CM

## 2025-01-24 PROCEDURE — 76770 US EXAM ABDO BACK WALL COMP: CPT

## 2025-02-24 ENCOUNTER — TELEPHONE (OUTPATIENT)
Dept: VASCULAR SURGERY | Age: 75
End: 2025-02-24

## 2025-02-24 NOTE — TELEPHONE ENCOUNTER
I contacted Dr. Braxton's office and they stated they would be faxing me over the request to hold the patient's blood thinner.  I haven't received as of yet.                  The patient called into the office today requesting us to bridge her with Lovenox for shots in her spine from Dr. Braxton.  I let the patient know I would need to contact his office tomorrow to get them to send me a medication hold and then I would get her bridged.  The procedure is on 03/10/2025 and she asked that I call the Lovenox into [facility].  I let her know I would call her back to go over the instructions.  The patient voiced understanding.

## 2025-03-06 DIAGNOSIS — I70.213 ATHEROSCLER OF NATIVE ARTERY OF BOTH LEGS WITH INTERMIT CLAUDICATION: ICD-10-CM

## 2025-03-10 RX ORDER — CLOPIDOGREL BISULFATE 75 MG/1
75 TABLET ORAL DAILY
Qty: 90 TABLET | Refills: 1 | Status: SHIPPED | OUTPATIENT
Start: 2025-03-10

## 2025-03-31 ENCOUNTER — TRANSCRIBE ORDERS (OUTPATIENT)
Dept: ADMINISTRATIVE | Facility: HOSPITAL | Age: 75
End: 2025-03-31
Payer: MEDICARE

## 2025-03-31 DIAGNOSIS — R07.9 CHEST PAIN, UNSPECIFIED TYPE: Primary | ICD-10-CM

## 2025-04-30 ENCOUNTER — HOSPITAL ENCOUNTER (OUTPATIENT)
Dept: CARDIOLOGY | Facility: HOSPITAL | Age: 75
Discharge: HOME | End: 2025-04-30
Payer: MEDICARE

## 2025-04-30 VITALS
HEIGHT: 64 IN | WEIGHT: 167 LBS | SYSTOLIC BLOOD PRESSURE: 129 MMHG | DIASTOLIC BLOOD PRESSURE: 59 MMHG | BODY MASS INDEX: 28.51 KG/M2 | HEART RATE: 72 BPM

## 2025-04-30 DIAGNOSIS — R07.9 CHEST PAIN, UNSPECIFIED TYPE: ICD-10-CM

## 2025-04-30 LAB
BH CV REST NUCLEAR ISOTOPE DOSE: 11.9 MCI
BH CV STRESS BP STAGE 1: NORMAL
BH CV STRESS COMMENTS STAGE 1: NORMAL
BH CV STRESS DOSE REGADENOSON STAGE 1: 0.4
BH CV STRESS DURATION MIN STAGE 1: 0
BH CV STRESS DURATION SEC STAGE 1: 10
BH CV STRESS HR STAGE 1: 77
BH CV STRESS NUCLEAR ISOTOPE DOSE: 36 MCI
BH CV STRESS PROTOCOL 1: NORMAL
BH CV STRESS RECOVERY BP: NORMAL MMHG
BH CV STRESS RECOVERY HR: 79 BPM
BH CV STRESS STAGE 1: 1
MAXIMAL PREDICTED HEART RATE: 143 BPM
PERCENT MAX PREDICTED HR: 53.85 %
STRESS BASELINE BP: NORMAL MMHG
STRESS BASELINE HR: 72 BPM
STRESS PERCENT HR: 63 %
STRESS POST EXERCISE DUR SEC: 10 SEC
STRESS POST PEAK BP: NORMAL MMHG
STRESS POST PEAK HR: 77 BPM
STRESS TARGET HR: 122 BPM

## 2025-04-30 PROCEDURE — 34310000005 TECHNETIUM TETROFOSMIN KIT: Performed by: NURSE PRACTITIONER

## 2025-04-30 PROCEDURE — A9502 TC99M TETROFOSMIN: HCPCS | Performed by: NURSE PRACTITIONER

## 2025-04-30 PROCEDURE — 93017 CV STRESS TEST TRACING ONLY: CPT

## 2025-04-30 PROCEDURE — 78452 HT MUSCLE IMAGE SPECT MULT: CPT

## 2025-04-30 PROCEDURE — 25010000002 REGADENOSON 0.4 MG/5ML SOLUTION: Performed by: EMERGENCY MEDICINE

## 2025-04-30 RX ORDER — REGADENOSON 0.08 MG/ML
0.4 INJECTION, SOLUTION INTRAVENOUS ONCE
Status: COMPLETED | OUTPATIENT
Start: 2025-04-30 | End: 2025-04-30

## 2025-04-30 RX ADMIN — TETROFOSMIN 1 DOSE: 1.38 INJECTION, POWDER, LYOPHILIZED, FOR SOLUTION INTRAVENOUS at 10:10

## 2025-04-30 RX ADMIN — TETROFOSMIN 1 DOSE: 1.38 INJECTION, POWDER, LYOPHILIZED, FOR SOLUTION INTRAVENOUS at 11:28

## 2025-04-30 RX ADMIN — REGADENOSON 0.4 MG: 0.08 INJECTION, SOLUTION INTRAVENOUS at 11:23

## 2025-06-16 ENCOUNTER — HOSPITAL ENCOUNTER (OUTPATIENT)
Dept: NON INVASIVE DIAGNOSTICS | Age: 75
Discharge: HOME | End: 2025-06-18
Payer: MEDICARE

## 2025-06-16 ENCOUNTER — OFFICE VISIT (OUTPATIENT)
Dept: VASCULAR SURGERY | Age: 75
End: 2025-06-16
Payer: MEDICARE

## 2025-06-16 VITALS
WEIGHT: 161 LBS | TEMPERATURE: 97.4 F | SYSTOLIC BLOOD PRESSURE: 116 MMHG | BODY MASS INDEX: 27.49 KG/M2 | OXYGEN SATURATION: 94 % | HEIGHT: 64 IN | DIASTOLIC BLOOD PRESSURE: 64 MMHG | HEART RATE: 80 BPM

## 2025-06-16 DIAGNOSIS — I70.213 ATHEROSCLER OF NATIVE ARTERY OF BOTH LEGS WITH INTERMIT CLAUDICATION: Primary | ICD-10-CM

## 2025-06-16 DIAGNOSIS — I65.23 BILATERAL CAROTID ARTERY STENOSIS: ICD-10-CM

## 2025-06-16 DIAGNOSIS — I70.213 ATHEROSCLER OF NATIVE ARTERY OF BOTH LEGS WITH INTERMIT CLAUDICATION: ICD-10-CM

## 2025-06-16 PROCEDURE — 4004F PT TOBACCO SCREEN RCVD TLK: CPT | Performed by: NURSE PRACTITIONER

## 2025-06-16 PROCEDURE — 99214 OFFICE O/P EST MOD 30 MIN: CPT | Performed by: NURSE PRACTITIONER

## 2025-06-16 PROCEDURE — G8417 CALC BMI ABV UP PARAM F/U: HCPCS | Performed by: NURSE PRACTITIONER

## 2025-06-16 PROCEDURE — 93880 EXTRACRANIAL BILAT STUDY: CPT

## 2025-06-16 PROCEDURE — 1159F MED LIST DOCD IN RCRD: CPT | Performed by: NURSE PRACTITIONER

## 2025-06-16 PROCEDURE — 3078F DIAST BP <80 MM HG: CPT | Performed by: NURSE PRACTITIONER

## 2025-06-16 PROCEDURE — G8400 PT W/DXA NO RESULTS DOC: HCPCS | Performed by: NURSE PRACTITIONER

## 2025-06-16 PROCEDURE — 1123F ACP DISCUSS/DSCN MKR DOCD: CPT | Performed by: NURSE PRACTITIONER

## 2025-06-16 PROCEDURE — 93923 UPR/LXTR ART STDY 3+ LVLS: CPT

## 2025-06-16 PROCEDURE — G8427 DOCREV CUR MEDS BY ELIG CLIN: HCPCS | Performed by: NURSE PRACTITIONER

## 2025-06-16 PROCEDURE — 1090F PRES/ABSN URINE INCON ASSESS: CPT | Performed by: NURSE PRACTITIONER

## 2025-06-16 PROCEDURE — 3074F SYST BP LT 130 MM HG: CPT | Performed by: NURSE PRACTITIONER

## 2025-06-16 NOTE — PROGRESS NOTES
mouth daily Managed by PCP       No current facility-administered medications for this visit.     Allergies: Codeine, Amoxicillin-pot clavulanate, Best-c [ascorbate], Fluticasone-salmeterol, Hctz, Hydrochlorothiazide, Irbesartan, Irbesartan-hydrochlorothiazide, Norvasc [amlodipine], Simvastatin, and Thiazide-type diuretics  Past Medical History:   Diagnosis Date    Carpal tunnel syndrome     Chronic back pain     Diabetes (HCC)     DVT (deep venous thrombosis) (HCC)     History of blood transfusion     Hx of blood clots     DVT-POST OP  SECTION, DVT X 2 SINCE THEN    Hyperlipidemia     Hypertension     Osteoarthritis     Pancreatitis     Tendonitis     Unspecified sleep apnea     Pt does not sleep with C-pap machine    Varicose vein 2014     Past Surgical History:   Procedure Laterality Date    APPENDECTOMY      BACK SURGERY      x2    BACK SURGERY      stimulator    CARPAL TUNNEL RELEASE Right     CATARACT EXTRACTION  2023    Dr Dion Avila    CATARACT EXTRACTION  2023    Dr Dion Avila     SECTION      x 3    CHOLECYSTECTOMY      COLONOSCOPY  2008    Dr Avila-[facility] x 3, 3 yr recall    COLONOSCOPY  10/25/2011    Dr Avila-[facility], 5 yr recall    COLONOSCOPY  2005    Dr Avila-[facility] x 1, 3 yr recall    COLONOSCOPY N/A 2024    Dr SALMA Carrasco-5 yr recall    CYST REMOVAL      GANGLION CYST REMOVAL    ERCP  2009    Dr Shantell Carrasoc[place]-w/balloon sweep-[place] class 2-3 chronic pancreatitis w/side branch abnormalities and normal pancreatic duct, biliary sludge, ampullary stenosis s/p biliary/pancreatic sphincterotomies, placement of a 3F x 8 cm pancreatic stent    HYSTERECTOMY (CERVIX STATUS UNKNOWN)      TOTAL    JOINT REPLACEMENT Bilateral     Knee    KNEE ARTHROSCOPY      x 2    LAMINECTOMY N/A 2021    THORACIC LAMINECTOMY FOR SPINAL CORD STIM WITH NEUROMONITORING performed by Dion Niño MD at [facility] OR    [facility]

## 2025-06-18 LAB
VAS LEFT ABI: 0.56
VAS LEFT ARM BP: 141 MMHG
VAS LEFT CALF PRESSURE: 85 MMHG
VAS LEFT CCA DIST EDV: 32.2 CM/S
VAS LEFT CCA DIST PSV: 116 CM/S
VAS LEFT CCA MID EDV: 28.3 CM/S
VAS LEFT CCA MID PSV: 123 CM/S
VAS LEFT DORSALIS PEDIS BP: 79 MMHG
VAS LEFT ECA EDV: 18.3 CM/S
VAS LEFT ECA PSV: 198 CM/S
VAS LEFT HIGH THIGH PRESSURE: 132 MMHG
VAS LEFT ICA DIST EDV: 25.9 CM/S
VAS LEFT ICA DIST PSV: 99 CM/S
VAS LEFT ICA MID EDV: 29.1 CM/S
VAS LEFT ICA MID PSV: 115 CM/S
VAS LEFT ICA PROX EDV: 34.6 CM/S
VAS LEFT ICA PROX PSV: 144 CM/S
VAS LEFT LOW THIGH PRESSURE: 93 MMHG
VAS LEFT PTA BP: 71 MMHG
VAS LEFT TBI: 0.28
VAS LEFT TOE PRESSURE: 40 MMHG
VAS LEFT VERTEBRAL EDV: 29.9 CM/S
VAS LEFT VERTEBRAL PSV: 78.6 CM/S
VAS RIGHT ABI: 0.77
VAS RIGHT ARM BP: 135 MMHG
VAS RIGHT CALF PRESSURE: 116 MMHG
VAS RIGHT CCA DIST EDV: 18.2 CM/S
VAS RIGHT CCA DIST PSV: 78 CM/S
VAS RIGHT CCA MID EDV: 19.3 CM/S
VAS RIGHT CCA MID PSV: 93.8 CM/S
VAS RIGHT DORSALIS PEDIS BP: 108 MMHG
VAS RIGHT ECA EDV: 10.2 CM/S
VAS RIGHT ECA PSV: 101 CM/S
VAS RIGHT HIGH THIGH PRESSURE: 167 MMHG
VAS RIGHT ICA DIST EDV: 31.4 CM/S
VAS RIGHT ICA DIST PSV: 106 CM/S
VAS RIGHT ICA MID EDV: 29.9 CM/S
VAS RIGHT ICA MID PSV: 102 CM/S
VAS RIGHT ICA PROX EDV: 32.2 CM/S
VAS RIGHT ICA PROX PSV: 127 CM/S
VAS RIGHT LOW THIGH PRESSURE: 146 MMHG
VAS RIGHT PTA BP: 108 MMHG
VAS RIGHT TBI: 0.35
VAS RIGHT TOE PRESSURE: 49 MMHG
VAS RIGHT VERTEBRAL EDV: 11 CM/S
VAS RIGHT VERTEBRAL PSV: 66 CM/S

## 2025-06-18 PROCEDURE — 93880 EXTRACRANIAL BILAT STUDY: CPT | Performed by: SURGERY

## 2025-06-18 PROCEDURE — 93923 UPR/LXTR ART STDY 3+ LVLS: CPT | Performed by: SURGERY

## (undated) DEVICE — YANKAUER,BULB TIP WITH VENT: Brand: ARGYLE

## (undated) DEVICE — TP SILK DURAPORE 3IN

## (undated) DEVICE — COLR CERV VISTA TX UNIV

## (undated) DEVICE — PIN DISTRACT TI 14MM STRL

## (undated) DEVICE — ANTIBACTERIAL UNDYED BRAIDED (POLYGLACTIN 910), SYNTHETIC ABSORBABLE SUTURE: Brand: COATED VICRYL

## (undated) DEVICE — PK ENT HD AND NK 30

## (undated) DEVICE — TUBING, SUCTION, 1/4" X 12', STRAIGHT: Brand: MEDLINE

## (undated) DEVICE — AGENT HEMSTAT 8ML FLX TIP MTRX + DISP SURGIFLO

## (undated) DEVICE — PACK,UNIVERSAL,NO GOWNS: Brand: MEDLINE

## (undated) DEVICE — FORCEP BPLR IRIS

## (undated) DEVICE — NEURO CDS

## (undated) DEVICE — SYSTEM RECHARGING NEUROSTIMULATOR RECHRG BTTRY PK PWR SUPL

## (undated) DEVICE — GLV SURG TRIUMPH GREEN W/ALOE PF LTX 7 STRL

## (undated) DEVICE — PK TURNOVER RM ADV

## (undated) DEVICE — SPNG DISSCT CHRRY 3/8IN STRL PK/5

## (undated) DEVICE — SUTURE PERMAHAND SZ 2-0 L18IN NONABSORBABLE BLK L26MM SH C012D

## (undated) DEVICE — GLV SURG BIOGEL LTX PF 6 1/2

## (undated) DEVICE — SUTURE NRLN SZ 4-0 L18IN NONABSORBABLE BLK L13MM TF 1/2 CIR C584D

## (undated) DEVICE — Device

## (undated) DEVICE — GAUZE,SPONGE,4"X4",16PLY,XRAY,STRL,LF: Brand: MEDLINE

## (undated) DEVICE — PROTECT TEETH A/

## (undated) DEVICE — BAG BND W36XL36IN TRNSPAR POLY GEN PURP W E BND CLSR TIDI

## (undated) DEVICE — PACK,SET UP,NO DRAPES: Brand: MEDLINE

## (undated) DEVICE — SUTURE VCRL SZ 3-0 L18IN ABSRB UD L26MM SH 1/2 CIR J864D

## (undated) DEVICE — HYPODERMIC SAFETY NEEDLE: Brand: MAGELLAN

## (undated) DEVICE — NDL HYPO PRECISIONGLIDE/REG 18G 11/2 PNK

## (undated) DEVICE — GLV SURG TRIUMPH GREEN W/ALOE PF LTX 8 STRL

## (undated) DEVICE — RESERVOIR,SUCTION,100CC,SILICONE: Brand: MEDLINE

## (undated) DEVICE — SUTURE VCRL SZ 2-0 L18IN ABSRB UD CT-1 L36MM 1/2 CIR J839D

## (undated) DEVICE — GLV SURG BIOGEL LTX PF 7 1/2

## (undated) DEVICE — TOOL 14MH30 LEGEND 14CM 3MM: Brand: MIDAS REX ™

## (undated) DEVICE — SPONGE,NEURO,0.5"X3",XR,STRL,LF,10/PK: Brand: MEDLINE

## (undated) DEVICE — GLV SURG BIOGEL M LTX PF 7 1/2

## (undated) DEVICE — ELECTRD NDL EDGE/INSUL/PFTE.787MM 2.84IN

## (undated) DEVICE — 1000 SES SMOKE EVACUATION SYSTEM, HAND HELD TUBING SET: Brand: 1000 SES

## (undated) DEVICE — GLV SURG SENSICARE W/ALOE PF LF 8 STRL

## (undated) DEVICE — TABLE KIT CVR AXIS JACKSON

## (undated) DEVICE — HALTR TRACT HD CERV STD UNIV

## (undated) DEVICE — TUBE ET 7.5MM NSL ORAL BASIC CUF INTMED MURPHY EYE RADPQ

## (undated) DEVICE — STRIP,CLOSURE,WOUND,MEDI-STRIP,1/2X4: Brand: MEDLINE

## (undated) DEVICE — TOWEL,OR,DSP,ST,BLUE,STD,4/PK,20PK/CS: Brand: MEDLINE

## (undated) DEVICE — TIBURON LAPAROTOMY DRAPE: Brand: CONVERTORS

## (undated) DEVICE — SUTURE VCRL SZ 3-0 L18IN ABSRB VLT CT-1 L36MM 1/2 CIR J738D

## (undated) DEVICE — APPL CHLORAPREP W/TINT 26ML ORNG

## (undated) DEVICE — ADHESIVE SKIN CLSR 0.7ML TOP DERMBND ADV

## (undated) DEVICE — SOLUTION IV IRRIG POUR BRL 0.9% SODIUM CHL 2F7124

## (undated) DEVICE — E-Z CLEAN, NON-STICK, PTFE COATED, ELECTROSURGICAL BLADE ELECTRODE, MODIFIED EXTENDED INSULATION, 2.5 INCH (6.35 CM): Brand: MEGADYNE

## (undated) DEVICE — MASTISOL ADHESIVE LIQ 2/3ML

## (undated) DEVICE — UNDERGLOVE SURG SZ 8 FNGR THK0.21MIL GRN LTX BEAD CUF

## (undated) DEVICE — GLV SURG SENSICARE W/ALOE PF LF 7.5 STRL

## (undated) DEVICE — CATHETER 8591-38 PASSER,38CM

## (undated) DEVICE — FORCEPS BX L240CM JAW DIA2.8MM L CAP W/ NDL MIC MESH TOOTH

## (undated) DEVICE — CONTROLLER NEUROSTIMULATOR HND HELD PRGMR WIRELESS PTM FOR

## (undated) DEVICE — ENDO KIT,LOURDES HOSPITAL: Brand: MEDLINE INDUSTRIES, INC.

## (undated) DEVICE — DRAPE,UTILITY,TAPE,15X26,STERILE: Brand: MEDLINE

## (undated) DEVICE — GOWN,NON-REINFORCED,SIRUS,SET IN SLV,XXL: Brand: MEDLINE

## (undated) DEVICE — LARYNGOSCOPE BLDE MAC HNDL M SZ 35 ST CURAPLEX CURAVIEW LED

## (undated) DEVICE — KT ANTI FOG W/FLD AND SPNG

## (undated) DEVICE — 4-PORT MANIFOLD: Brand: NEPTUNE 2

## (undated) DEVICE — GLOVE SURG SZ 8 L12IN FNGR THK94MIL TRNSLUC YEL LTX HYDRGEL

## (undated) DEVICE — 3.0MM PRECISION NEURO (MATCH HEAD)

## (undated) DEVICE — MASK VENTILATOR MED AD SUPERNOVA ET

## (undated) DEVICE — SYR LUERLOK 30CC

## (undated) DEVICE — PK SPINE CERV ANT 30

## (undated) DEVICE — DRSNG TELFA PAD NONADH STR 1S 3X8IN